# Patient Record
Sex: FEMALE | Race: WHITE | NOT HISPANIC OR LATINO | Employment: OTHER | URBAN - METROPOLITAN AREA
[De-identification: names, ages, dates, MRNs, and addresses within clinical notes are randomized per-mention and may not be internally consistent; named-entity substitution may affect disease eponyms.]

---

## 2017-03-23 ENCOUNTER — TELEPHONE (OUTPATIENT)
Dept: MEDICAL GROUP | Facility: PHYSICIAN GROUP | Age: 82
End: 2017-03-23

## 2017-03-23 ENCOUNTER — HOSPITAL ENCOUNTER (OUTPATIENT)
Dept: LAB | Facility: MEDICAL CENTER | Age: 82
End: 2017-03-23
Attending: NURSE PRACTITIONER
Payer: MEDICARE

## 2017-03-23 DIAGNOSIS — F17.200 CURRENT SMOKER: ICD-10-CM

## 2017-03-23 DIAGNOSIS — Z51.81 ENCOUNTER FOR MONITORING STATIN THERAPY: ICD-10-CM

## 2017-03-23 DIAGNOSIS — E78.2 MIXED HYPERLIPIDEMIA: ICD-10-CM

## 2017-03-23 DIAGNOSIS — Z79.899 ENCOUNTER FOR MONITORING STATIN THERAPY: ICD-10-CM

## 2017-03-23 DIAGNOSIS — E03.8 OTHER SPECIFIED HYPOTHYROIDISM: ICD-10-CM

## 2017-03-23 LAB
ALBUMIN SERPL BCP-MCNC: 4 G/DL (ref 3.2–4.9)
ALBUMIN/GLOB SERPL: 1.2 G/DL
ALP SERPL-CCNC: 85 U/L (ref 30–99)
ALT SERPL-CCNC: 9 U/L (ref 2–50)
ANION GAP SERPL CALC-SCNC: 8 MMOL/L (ref 0–11.9)
AST SERPL-CCNC: 18 U/L (ref 12–45)
BILIRUB SERPL-MCNC: 0.6 MG/DL (ref 0.1–1.5)
BUN SERPL-MCNC: 15 MG/DL (ref 8–22)
CALCIUM SERPL-MCNC: 9.5 MG/DL (ref 8.5–10.5)
CHLORIDE SERPL-SCNC: 106 MMOL/L (ref 96–112)
CHOLEST SERPL-MCNC: 189 MG/DL (ref 100–199)
CO2 SERPL-SCNC: 27 MMOL/L (ref 20–33)
CREAT SERPL-MCNC: 0.86 MG/DL (ref 0.5–1.4)
ERYTHROCYTE [DISTWIDTH] IN BLOOD BY AUTOMATED COUNT: 50.6 FL (ref 35.9–50)
GLOBULIN SER CALC-MCNC: 3.3 G/DL (ref 1.9–3.5)
GLUCOSE SERPL-MCNC: 92 MG/DL (ref 65–99)
HCT VFR BLD AUTO: 47.1 % (ref 37–47)
HDLC SERPL-MCNC: 61 MG/DL
HGB BLD-MCNC: 15.1 G/DL (ref 12–16)
LDLC SERPL CALC-MCNC: 108 MG/DL
MCH RBC QN AUTO: 29.7 PG (ref 27–33)
MCHC RBC AUTO-ENTMCNC: 32.1 G/DL (ref 33.6–35)
MCV RBC AUTO: 92.5 FL (ref 81.4–97.8)
PLATELET # BLD AUTO: 133 K/UL (ref 164–446)
PMV BLD AUTO: 12.5 FL (ref 9–12.9)
POTASSIUM SERPL-SCNC: 3.8 MMOL/L (ref 3.6–5.5)
PROT SERPL-MCNC: 7.3 G/DL (ref 6–8.2)
RBC # BLD AUTO: 5.09 M/UL (ref 4.2–5.4)
SODIUM SERPL-SCNC: 141 MMOL/L (ref 135–145)
T4 FREE SERPL-MCNC: 1.46 NG/DL (ref 0.53–1.43)
TRIGL SERPL-MCNC: 98 MG/DL (ref 0–149)
TSH SERPL DL<=0.005 MIU/L-ACNC: 4.25 UIU/ML (ref 0.3–3.7)
WBC # BLD AUTO: 5.1 K/UL (ref 4.8–10.8)

## 2017-03-23 PROCEDURE — 84443 ASSAY THYROID STIM HORMONE: CPT

## 2017-03-23 PROCEDURE — 85027 COMPLETE CBC AUTOMATED: CPT

## 2017-03-23 PROCEDURE — 80053 COMPREHEN METABOLIC PANEL: CPT

## 2017-03-23 PROCEDURE — 36415 COLL VENOUS BLD VENIPUNCTURE: CPT

## 2017-03-23 PROCEDURE — 80061 LIPID PANEL: CPT

## 2017-03-23 PROCEDURE — 84439 ASSAY OF FREE THYROXINE: CPT

## 2017-03-23 RX ORDER — LEVOTHYROXINE SODIUM 0.12 MG/1
TABLET ORAL
Qty: 30 TAB | Refills: 2 | Status: SHIPPED | OUTPATIENT
Start: 2017-03-23 | End: 2017-03-27

## 2017-03-23 NOTE — TELEPHONE ENCOUNTER
Was the patient seen in the last year in this department? Yes     Does patient have an active prescription for medications requested? No     Received Request Via: Pharmacy      Pt met protocol?: Yes pt last ov 12/2016 last TSH labs 8/2016

## 2017-03-24 NOTE — TELEPHONE ENCOUNTER
----- Message from MADHU Tapia sent at 3/23/2017  4:38 PM PDT -----  There are some abnormalities in the labs.  We will discuss at upcoming appt.  Thank you

## 2017-03-27 ENCOUNTER — OFFICE VISIT (OUTPATIENT)
Dept: MEDICAL GROUP | Facility: PHYSICIAN GROUP | Age: 82
End: 2017-03-27
Payer: MEDICARE

## 2017-03-27 VITALS
BODY MASS INDEX: 17.43 KG/M2 | OXYGEN SATURATION: 91 % | RESPIRATION RATE: 16 BRPM | HEART RATE: 88 BPM | HEIGHT: 63 IN | DIASTOLIC BLOOD PRESSURE: 84 MMHG | SYSTOLIC BLOOD PRESSURE: 128 MMHG | WEIGHT: 98.4 LBS | TEMPERATURE: 97.3 F

## 2017-03-27 DIAGNOSIS — E03.8 OTHER SPECIFIED HYPOTHYROIDISM: Primary | ICD-10-CM

## 2017-03-27 DIAGNOSIS — F17.200 CURRENT SMOKER: ICD-10-CM

## 2017-03-27 DIAGNOSIS — J44.89 COPD (CHRONIC OBSTRUCTIVE PULMONARY DISEASE) WITH CHRONIC BRONCHITIS: ICD-10-CM

## 2017-03-27 PROCEDURE — G8419 CALC BMI OUT NRM PARAM NOF/U: HCPCS | Performed by: NURSE PRACTITIONER

## 2017-03-27 PROCEDURE — 99214 OFFICE O/P EST MOD 30 MIN: CPT | Performed by: NURSE PRACTITIONER

## 2017-03-27 PROCEDURE — 4004F PT TOBACCO SCREEN RCVD TLK: CPT | Performed by: NURSE PRACTITIONER

## 2017-03-27 PROCEDURE — 1101F PT FALLS ASSESS-DOCD LE1/YR: CPT | Performed by: NURSE PRACTITIONER

## 2017-03-27 PROCEDURE — G8432 DEP SCR NOT DOC, RNG: HCPCS | Performed by: NURSE PRACTITIONER

## 2017-03-27 PROCEDURE — 4040F PNEUMOC VAC/ADMIN/RCVD: CPT | Performed by: NURSE PRACTITIONER

## 2017-03-27 PROCEDURE — G8482 FLU IMMUNIZE ORDER/ADMIN: HCPCS | Performed by: NURSE PRACTITIONER

## 2017-03-27 RX ORDER — LEVOTHYROXINE SODIUM 112 UG/1
112 TABLET ORAL
Qty: 90 TAB | Refills: 1 | Status: SHIPPED | OUTPATIENT
Start: 2017-03-27 | End: 2017-08-24 | Stop reason: SDUPTHER

## 2017-03-27 NOTE — MR AVS SNAPSHOT
"        Mary Murrieta   3/27/2017 8:00 AM   Office Visit   MRN: [id number]    Department:  [facility]   Dept Phone:  [phone]    Description:  Female : 1932   Provider:  MADHU Tapia           Reason for Visit     Results labs       Allergies as of 3/27/2017     No Known Allergies      You were diagnosed with     Other specified hypothyroidism   [5318391]  -  Primary     COPD (chronic obstructive pulmonary disease) with chronic bronchitis (CMS-[facility])   [852946]       Current smoker   [[id number]]         Vital Signs     Blood Pressure Pulse Temperature Respirations Height Weight    128/84 mmHg 88 36.3 °C (97.3 °F) 16 1.6 m (5' 3\") 44.634 kg (98 lb 6.4 oz)    Body Mass Index Oxygen Saturation Smoking Status             17.44 kg/m2 91% Current Every Day Smoker         Basic Information     Date Of Birth Sex Race Ethnicity Preferred Language    1932 Female White Non- English      Problem List              ICD-10-CM Priority Class Noted - Resolved    Other specified hypothyroidism E03.8   2016 - Present    Current smoker F17.200   2016 - Present    Mixed hyperlipidemia E78.2   2016 - Present      Health Maintenance        Date Due Completion Dates    COLON CANCER SCREENING ANNUAL FIT 2017 (Originally 1932) ---    IMM DTaP/Tdap/Td Vaccine (1 - Tdap) 2017 (Originally 1951) ---    IMM ZOSTER VACCINE 2017 (Originally 1992) ---            Current Immunizations     13-VALENT PCV PREVNAR 10/1/2015    Influenza Vaccine Adult HD 2016, 10/1/2015    Pneumococcal polysaccharide vaccine (PPSV-23) 2016      Below and/or attached are the medications your provider expects you to take. Review all of your home medications and newly ordered medications with your provider and/or pharmacist. Follow medication instructions as directed by your provider and/or pharmacist. Please keep your medication list with you and share with your provider. " Update the information when medications are discontinued, doses are changed, or new medications (including over-the-counter products) are added; and carry medication information at all times in the event of emergency situations     Allergies:  No Known Allergies          Medications  Valid as of: March 27, 2017 - 10:18 AM    Generic Name Brand Name Tablet Size Instructions for use    Aspirin (Tablet Delayed Response) ECOTRIN 81 MG Take 81 mg by mouth every day.        Atorvastatin Calcium (Tab) LIPITOR 10 MG Take 1 Tab by mouth every day.        Levothyroxine Sodium (Tab) SYNTHROID 112 MCG Take 1 Tab by mouth Every morning on an empty stomach.        Omeprazole (CAPSULE DELAYED RELEASE) PRILOSEC 20 MG Take 20 mg by mouth every day.        Tiotropium Bromide Monohydrate (Cap) SPIRIVA 18 MCG Inhale 1 Cap by mouth every day.        .                 Medicines prescribed today were sent to:     [id number] - [place], [place] - [address]    [address] [address]    Phone: [phone] Fax: [phone]    Open 24 Hours?: No      Medication refill instructions:       If your prescription bottle indicates you have medication refills left, it is not necessary to call your provider’s office. Please contact your pharmacy and they will refill your medication.    If your prescription bottle indicates you do not have any refills left, you may request refills at any time through one of the following ways: The online [employer]On system (except Urgent Care), by calling your provider’s office, or by asking your pharmacy to contact your provider’s office with a refill request. Medication refills are processed only during regular business hours and may not be available until the next business day. Your provider may request additional information or to have a follow-up visit with you prior to refilling your medication.   *Please Note: Medication refills are assigned a new Rx number when refilled  electronically. Your pharmacy may indicate that no refills were authorized even though a new prescription for the same medication is available at the pharmacy. Please request the medicine by name with the pharmacy before contacting your provider for a refill.        Your To Do List     Future Labs/Procedures Complete By Expires    TSH WITH REFLEX TO FT4  As directed 3/27/2018         Customer Alliance Access Code: [email or web address]  Expires: 4/25/2017 11:00 AM    Customer Alliance  A secure, online tool to manage your health information     [employer]’s Customer Alliance® is a secure, online tool that connects you to your personalized health information from the privacy of your home -- day or night - making it very easy for you to manage your healthcare. Once the activation process is completed, you can even access your medical information using the Customer Alliance valdez, which is available for free in the Apple Valdez store or Google Play store.     Customer Alliance provides the following levels of access (as shown below):   My Chart Features   [facility] Primary Care Doctor [facility]  Specialists [facility]  Urgent  Care Non-[facility]  Primary Care  Doctor   Email your healthcare team securely and privately 24/7 X X X    Manage appointments: schedule your next appointment; view details of past/upcoming appointments X      Request prescription refills. X      View recent personal medical records, including lab and immunizations X X X X   View health record, including health history, allergies, medications X X X X   Read reports about your outpatient visits, procedures, consult and ER notes X X X X   See your discharge summary, which is a recap of your hospital and/or ER visit that includes your diagnosis, lab results, and care plan. X X       How to register for Customer Alliance:  1. Go to  [email or web address].  2. Click on the Sign Up Now box, which takes you to the New Member Sign Up page. You will need to provide the following information:  a. Enter your Customer Alliance Access Code  exactly as it appears at the top of this page. (You will not need to use this code after you’ve completed the sign-up process. If you do not sign up before the expiration date, you must request a new code.)   b. Enter your date of birth.   c. Enter your home email address.   d. Click Submit, and follow the next screen’s instructions.  3. Create a [facility] [facility] [employer] ID. This will be your [facility] login ID and cannot be changed, so think of one that is secure and easy to remember.  4. Create a [employer] password. You can change your password at any time.  5. Enter your Password Reset Question and Answer. This can be used at a later time if you forget your password.   6. Enter your e-mail address. This allows you to receive e-mail notifications when new information is available in [employer] [facility] [employer].  7. Click Sign Up. You can now view your health information.    For assistance activating your [employer] [facility] [employer] account, call [phone]        Quit Tobacco Information     Do you want to quit using tobacco?    Quitting tobacco decreases risks of cancer, heart and lung disease, increases life expectancy, improves sense of taste and smell, and increases spending money, among other benefits.    If you are thinking about quitting, we can help.  • [facility] Quit Tobacco Program: 775.717.3978  o Program occurs weekly for four weeks and includes pharmacist consultation on products to support quitting smoking or chewing tobacco. A provider referral is needed for pharmacist consultation.  • Tobacco Users Help Hotline: 2-800-QUIT-NOW (185-1700) or https://[place].quitlogix.org/  o Free, confidential telephone and online coaching for [place] residents. Sessions are designed on a schedule that is convenient for you. Eligible clients receive free nicotine replacement therapy.  • Nationally: www.smokefree.gov  o Information and professional assistance to support both immediate and long-term needs as you become, and remain, a non-smoker. Smokefree.gov allows you to  choose the help that best fits your needs.

## 2017-03-27 NOTE — PROGRESS NOTES
"Chief Complaint   Patient presents with   • Results     labs        HISTORY OF PRESENT ILLNESS: Patient is a 84 y.o. female established patient who presents today to discuss recent results:    1. Other specified hypothyroidism  Patient has been on thyroid replacement for a number of years.  She is currently using levothyroxine 125 µg.  Recent labs are somewhat unusual as her TSH is elevated at 4.25, however free T is also elevated at 1.46.  Patient does mention that she has been having a hard time maintaining her weight.  She denies any bothersome diarrhea, increased anxiety or heart palpitations.  No disruption in her sleep.    2. COPD (chronic obstructive pulmonary disease) with chronic bronchitis (CMS-[facility])  Patient is inquiring about a cheaper alternative to her current medication, Spiriva.  She mentions that she does not take it daily, only \"as needed.\"  She reports her last dose was yesterday.  She does cough, more so in the morning.  She continues to smoke.  She denies any urgent care or ER visits over the winter.    3. Current smoker    Allergies:Review of patient's allergies indicates no known allergies.    Current Outpatient Prescriptions Ordered in [facility]   Medication Sig Dispense Refill   • levothyroxine (SYNTHROID) 112 MCG Tab Take 1 Tab by mouth Every morning on an empty stomach. 90 Tab 1   • aspirin EC (ECOTRIN) 81 MG Tablet Delayed Response Take 81 mg by mouth every day.     • atorvastatin (LIPITOR) 10 MG Tab Take 1 Tab by mouth every day. 90 Tab 3   • tiotropium (SPIRIVA) 18 MCG Cap Inhale 1 Cap by mouth every day. 90 Cap 3   • omeprazole (PRILOSEC) 20 MG delayed-release capsule Take 20 mg by mouth every day.       No current [facility]-ordered facility-administered medications on file.       Past Medical History   Diagnosis Date   • Thyroid disease        Social History   Substance Use Topics   • Smoking status: Current Every Day Smoker -- 0.50 packs/day for 50 years     Types: Cigarettes   • Smokeless " "tobacco: Never Used   • Alcohol Use: 0.0 oz/week     0 Standard drinks or equivalent per week      Comment: has a glass of wine 3x a week       Family Status   Relation Status Death Age   • Mother     • Father     • Sister     • Sister     • Sister       Family History   Problem Relation Age of Onset   • Cancer Mother    • Cancer Father      esophageal   • Stroke Father    • Lung Disease Sister      emphysema       ROS: see above    Review of Systems   Constitutional: Negative for fever, chills, weight loss and malaise/fatigue.   HENT: Negative for ear pain, nosebleeds, congestion, sore throat and neck pain.    Eyes: Negative for blurred vision.   Respiratory: Negative for cough, sputum production, shortness of breath and wheezing.    Cardiovascular: Negative for chest pain, palpitations, orthopnea and leg swelling.   Gastrointestinal: Negative for heartburn, nausea, vomiting and abdominal pain.   Genitourinary: Negative for dysuria, urgency and frequency.   Musculoskeletal: Negative for myalgias, back pain and joint pain.   Skin: Negative for rash and itching.   Neurological: Negative for dizziness, tingling, tremors, sensory change, focal weakness and headaches.   Endo/Heme/Allergies: Does not bruise/bleed easily.   Psychiatric/Behavioral: Negative for depression, suicidal ideas and memory loss.  The patient is not nervous/anxious and does not have insomnia.        Exam:  Blood pressure 128/84, pulse 88, temperature 36.3 °C (97.3 °F), resp. rate 16, height 1.6 m (5' 3\"), weight 44.634 kg (98 lb 6.4 oz), SpO2 91 %.  General:  Well nourished, well developed female in NAD.  Head is grossly normal.  Neck: Thyroid is not enlarged.  Pulmonary: Normal effort. Diminished throughout.  Cardiovascular: Regular rate and rhythm without murmur.   Extremities: no clubbing, cyanosis, or edema.  Psych:  Mood and affect are normal.  Answering questions appropriately with good eye " contact.      Please note that this dictation was created using voice recognition software. I have made every reasonable attempt to correct obvious errors, but I expect that there are errors of grammar and possibly content that I did not discover before finalizing the note.    Assessment/Plan:    1. Other specified hypothyroidism  levothyroxine (SYNTHROID) 112 MCG Tab    TSH WITH REFLEX TO FT4   2. COPD (chronic obstructive pulmonary disease) with chronic bronchitis (CMS-[facility])     3. Current smoker          1.  Reduce levothyroxine to 112 µg.  Plan on repeating thyroid labs in 6-8 weeks.  2.  Discussed the purpose of Spiriva, as a maintenance medication, and not to be used as needed.  Patient verbalizes understanding.  3.  Handicap placard issue, as requested.  4.  We will contact the patient with the above lab results, follow-up pending.

## 2017-05-11 RX ORDER — ATORVASTATIN CALCIUM 10 MG/1
TABLET, FILM COATED ORAL
Qty: 90 TAB | Refills: 1 | Status: SHIPPED | OUTPATIENT
Start: 2017-05-11 | End: 2018-02-01 | Stop reason: SDUPTHER

## 2017-05-11 NOTE — TELEPHONE ENCOUNTER
Was the patient seen in the last year in this department? Yes     Does patient have an active prescription for medications requested? No     Received Request Via: Pharmacy      Pt met protocol?: Yes, last ov 3/27/17, last lipid panel 3/23/17  BP Readings from Last 1 Encounters:   03/27/17 128/84

## 2017-05-11 NOTE — TELEPHONE ENCOUNTER
Last seen by PCP 3/17. Will send 6 months to pharmacy.   Lab Results   Component Value Date/Time    CHOLESTEROL, 03/23/2017 07:35 AM    * 03/23/2017 07:35 AM    HDL 61 03/23/2017 07:35 AM    TRIGLYCERIDES 98 03/23/2017 07:35 AM       Lab Results   Component Value Date/Time    SODIUM 141 03/23/2017 07:35 AM    POTASSIUM 3.8 03/23/2017 07:35 AM    CHLORIDE 106 03/23/2017 07:35 AM    CO2 27 03/23/2017 07:35 AM    GLUCOSE 92 03/23/2017 07:35 AM    BUN 15 03/23/2017 07:35 AM    CREATININE 0.86 03/23/2017 07:35 AM     Lab Results   Component Value Date/Time    ALKALINE PHOSPHATASE 85 03/23/2017 07:35 AM    AST(SGOT) 18 03/23/2017 07:35 AM    ALT(SGPT) 9 03/23/2017 07:35 AM    TOTAL BILIRUBIN 0.6 03/23/2017 07:35 AM

## 2017-06-05 RX ORDER — TIOTROPIUM BROMIDE 18 UG/1
CAPSULE ORAL; RESPIRATORY (INHALATION)
Qty: 90 CAP | Refills: 1 | Status: SHIPPED | OUTPATIENT
Start: 2017-06-05 | End: 2017-11-21

## 2017-06-05 NOTE — TELEPHONE ENCOUNTER
Was the patient seen in the last year in this department? Yes     Does patient have an active prescription for medications requested? No     Received Request Via: Pharmacy      Pt met protocol?: Yes    LAST OV 03/27/2017

## 2017-08-24 RX ORDER — LEVOTHYROXINE SODIUM 112 UG/1
112 TABLET ORAL
Qty: 90 TAB | Refills: 0 | Status: SHIPPED | OUTPATIENT
Start: 2017-08-24 | End: 2017-09-29

## 2017-08-24 RX ORDER — LEVOTHYROXINE SODIUM 0.12 MG/1
TABLET ORAL
Refills: 2 | OUTPATIENT
Start: 2017-08-24

## 2017-08-24 NOTE — TELEPHONE ENCOUNTER
Was the patient seen in the last year in this department? Yes     Does patient have an active prescription for medications requested? No     Received Request Via: Pharmacy      Pt met protocol?: Yes, LABS AND OV 3/17 TSH 4.250

## 2017-08-24 NOTE — TELEPHONE ENCOUNTER
Has upcoming appt w/ PCP. Will send 3 months of fills to pharmacy. Sent correct strength to pharmacy.

## 2017-09-20 ENCOUNTER — OFFICE VISIT (OUTPATIENT)
Dept: MEDICAL GROUP | Facility: PHYSICIAN GROUP | Age: 82
End: 2017-09-20
Payer: MEDICARE

## 2017-09-20 ENCOUNTER — HOSPITAL ENCOUNTER (OUTPATIENT)
Dept: LAB | Facility: MEDICAL CENTER | Age: 82
End: 2017-09-20
Attending: NURSE PRACTITIONER
Payer: MEDICARE

## 2017-09-20 VITALS
HEART RATE: 92 BPM | OXYGEN SATURATION: 95 % | BODY MASS INDEX: 16.66 KG/M2 | WEIGHT: 94 LBS | DIASTOLIC BLOOD PRESSURE: 86 MMHG | TEMPERATURE: 97.7 F | SYSTOLIC BLOOD PRESSURE: 112 MMHG | HEIGHT: 63 IN | RESPIRATION RATE: 14 BRPM

## 2017-09-20 DIAGNOSIS — E78.2 MIXED HYPERLIPIDEMIA: ICD-10-CM

## 2017-09-20 DIAGNOSIS — E89.0 POSTOPERATIVE HYPOTHYROIDISM: ICD-10-CM

## 2017-09-20 DIAGNOSIS — F17.200 CURRENT SMOKER: ICD-10-CM

## 2017-09-20 LAB
ALBUMIN SERPL BCP-MCNC: 4.5 G/DL (ref 3.2–4.9)
ALBUMIN/GLOB SERPL: 1.5 G/DL
ALP SERPL-CCNC: 96 U/L (ref 30–99)
ALT SERPL-CCNC: 9 U/L (ref 2–50)
ANION GAP SERPL CALC-SCNC: 12 MMOL/L (ref 0–11.9)
AST SERPL-CCNC: 18 U/L (ref 12–45)
BILIRUB SERPL-MCNC: 0.7 MG/DL (ref 0.1–1.5)
BUN SERPL-MCNC: 15 MG/DL (ref 8–22)
CALCIUM SERPL-MCNC: 9.7 MG/DL (ref 8.5–10.5)
CHLORIDE SERPL-SCNC: 102 MMOL/L (ref 96–112)
CO2 SERPL-SCNC: 26 MMOL/L (ref 20–33)
CREAT SERPL-MCNC: 0.85 MG/DL (ref 0.5–1.4)
GFR SERPL CREATININE-BSD FRML MDRD: >60 ML/MIN/1.73 M 2
GLOBULIN SER CALC-MCNC: 3.1 G/DL (ref 1.9–3.5)
GLUCOSE SERPL-MCNC: 89 MG/DL (ref 65–99)
POTASSIUM SERPL-SCNC: 4.1 MMOL/L (ref 3.6–5.5)
PROT SERPL-MCNC: 7.6 G/DL (ref 6–8.2)
SODIUM SERPL-SCNC: 140 MMOL/L (ref 135–145)
TSH SERPL DL<=0.005 MIU/L-ACNC: 1.55 UIU/ML (ref 0.3–3.7)

## 2017-09-20 PROCEDURE — 80053 COMPREHEN METABOLIC PANEL: CPT

## 2017-09-20 PROCEDURE — 36415 COLL VENOUS BLD VENIPUNCTURE: CPT

## 2017-09-20 PROCEDURE — 99214 OFFICE O/P EST MOD 30 MIN: CPT | Performed by: NURSE PRACTITIONER

## 2017-09-20 PROCEDURE — 84443 ASSAY THYROID STIM HORMONE: CPT

## 2017-09-20 RX ORDER — BUDESONIDE AND FORMOTEROL FUMARATE DIHYDRATE 160; 4.5 UG/1; UG/1
2 AEROSOL RESPIRATORY (INHALATION) 2 TIMES DAILY
Qty: 1 INHALER | Refills: 1 | Status: SHIPPED | OUTPATIENT
Start: 2017-09-20 | End: 2017-11-09 | Stop reason: SDUPTHER

## 2017-09-20 NOTE — ASSESSMENT & PLAN NOTE
Patient is a current smoker and pat smokes one pack per day of cigarettes and has no intention of stopping. Currently on Spiriva 18 µg capsules daily but does not feel that it is working and will like a prescription for Symbicort due to cost.

## 2017-09-20 NOTE — ASSESSMENT & PLAN NOTE
Chronic condition: Patient is treated for hypothyroidism with levothyroxine 112 µg. She states that she had a thyroidectomy approximately 40 years ago. She  denies heart palpitations, fatigue, weight gain, depression, dry skin, hair loss, constipation, weakness, headache, lethargy or panic .  She has temperature intolerance.

## 2017-09-20 NOTE — ASSESSMENT & PLAN NOTE
Chronic Condition: Patient has hyperlipidemia and is currently taking atorvastatin 10 mg daily for approximately 5 years. She denies any chest pain, diaphoresis, shortness of breath, headaches, dizziness, blurred vision or myalgias. Patient is a  Smoker, one ppd .

## 2017-09-20 NOTE — PROGRESS NOTES
Chief Complaint   Patient presents with   • Establish Care     N2U    • Oral Swelling   • Medication Management     Spiriva        HPI:    Mary Murrieta is a 84 y.o. female here to establish care and to discuss the following:    Hypothyroidism  Chronic condition: Patient is treated for hypothyroidism with levothyroxine 112 µg. She states that she had a thyroidectomy approximately 40 years ago. She  denies heart palpitations, fatigue, weight gain, depression, dry skin, hair loss, constipation, weakness, headache, lethargy or panic .  She has temperature intolerance.    Current smoker  Patient is a current smoker and pat smokes one pack per day of cigarettes and has no intention of stopping. Currently on Spiriva 18 µg capsules daily but does not feel that it is working and will like a prescription for Symbicort due to cost.    Mixed hyperlipidemia  Chronic Condition: Patient has hyperlipidemia and is currently taking atorvastatin 10 mg daily for approximately 5 years. She denies any chest pain, diaphoresis, shortness of breath, headaches, dizziness, blurred vision or myalgias. Patient is a  Smoker, one ppd .       Current medicines (including changes today)  Current Outpatient Prescriptions   Medication Sig Dispense Refill   • budesonide-formoterol (SYMBICORT) 160-4.5 MCG/ACT Aerosol Inhale 2 Puffs by mouth 2 Times a Day. 1 Inhaler 1   • levothyroxine (SYNTHROID) 112 MCG Tab Take 1 Tab by mouth Every morning on an empty stomach. 90 Tab 0   • SPIRIVA HANDIHALER 18 MCG Cap INHALE 1 CAP BY MOUTH EVERY DAY. 90 Cap 1   • atorvastatin (LIPITOR) 10 MG Tab TAKE 1 TAB BY MOUTH EVERY DAY. 90 Tab 1   • aspirin EC (ECOTRIN) 81 MG Tablet Delayed Response Take 81 mg by mouth every day.     • omeprazole (PRILOSEC) 20 MG delayed-release capsule Take 20 mg by mouth every day.       No current facility-administered medications for this visit.      She  has a past medical history of Thyroid disease.  She  has a past surgical history that  "includes tonsillectomy (1938); thyroidectomy; and abdominal hysterectomy total ().  Social History   Substance Use Topics   • Smoking status: Current Every Day Smoker     Packs/day: 0.50     Years: 50.00     Types: Cigarettes   • Smokeless tobacco: Never Used   • Alcohol use 0.0 oz/week      Comment: has a glass of wine 3x a week     Social History     Social History Narrative   • No narrative on file     Family History   Problem Relation Age of Onset   • Cancer Mother    • Cancer Father      esophageal   • Stroke Father    • Lung Disease Sister      emphysema     Family Status   Relation Status   • Mother    • Father    • Sister    • Sister    • Sister          ROS    Review of Systems   Constitutional: Negative for fever, chills, weight loss and malaise/fatigue.   HENT: Negative for ear pain, nosebleeds, congestion, sore throat and neck pain.    Eyes: Negative for blurred vision.   Respiratory: Negative for cough, sputum production, shortness of breath and wheezing. Positive for dyspnea on exertion  Cardiovascular: Negative for chest pain, palpitations,  and leg swelling.   Gastrointestinal: Negative for heartburn, nausea, vomiting, diarrhea and abdominal pain.   Genitourinary: Negative for dysuria, urgency and frequency.   Musculoskeletal: Negative for myalgias, back pain and joint pain.   Skin: Negative for rash and itching.   Neurological: Negative for dizziness, tingling, tremors, sensory change, focal weakness and headaches.   Endo/Heme/Allergies: Does not bruise/bleed easily.   Psychiatric/Behavioral: Negative for depression, anxiety, suicidal ideas, insomnia and memory loss.      All other systems reviewed and are negative except as in HPI.     Objective:     Blood pressure 112/86, pulse 92, temperature 36.5 °C (97.7 °F), resp. rate 14, height 1.6 m (5' 3\"), weight 42.6 kg (94 lb), SpO2 95 %. Body mass index is 16.65 kg/m².  Physical Exam:  Constitutional: Alert, " no distress. Frail  Skin: Warm, dry, good turgor, no rashes in visible areas.  Eye: Equal, round and reactive, conjunctiva clear, lids normal.  ENMT: Lips without lesions, good dentition, oropharynx clear. Ear canals are clear, TMs within normal limits bilaterally.   Neck: Trachea midline, no masses, no thyromegaly. No cervical or supraclavicular lymphadenopathy.  Respiratory: Unlabored respiratory effort, lungs clear to auscultation, no wheezes, no ronchi.  Cardiovascular: Normal S1, S2, no murmur, no edema.  Abdomen: Soft, non-tender, no masses, no hepatosplenomegaly.  Psych: Alert and oriented x3, normal affect and mood.  MS: Ambulates independently with steady gait. Has full range of motion of all extremities and spine.  Neuro: Cranial nerves intact. DTRs within normal limits. No neurological deficits.        Assessment and Plan:   The following treatment plan was discussed   1. Postoperative hypothyroidism  COMP METABOLIC PANEL    TSH WITH REFLEX TO FT4   2. Current smoker  budesonide-formoterol (SYMBICORT) 160-4.5 MCG/ACT Aerosol   3. Mixed hyperlipidemia  Continue current medication.       Followup: Return in about 3 months (around 12/20/2017) for thyroid, follow-up on Symbicort.   Please note that this dictation was created using voice recognition software. I have made every reasonable attempt to correct obvious errors, but I expect that there are errors of grammar and possibly content that I did not discover before finalizing the note.

## 2017-09-21 ENCOUNTER — TELEPHONE (OUTPATIENT)
Dept: MEDICAL GROUP | Facility: PHYSICIAN GROUP | Age: 82
End: 2017-09-21

## 2017-09-21 NOTE — TELEPHONE ENCOUNTER
----- Message from PRADIP Kebede sent at 9/21/2017  2:39 PM PDT -----  Please call patient. I have reviewed their most recent lab results. They are within normal limits.  Followup as discussed.    Please message me with any questions or concerns.

## 2017-09-29 RX ORDER — LEVOTHYROXINE SODIUM 112 UG/1
112 TABLET ORAL
Qty: 90 TAB | Refills: 1 | Status: SHIPPED | OUTPATIENT
Start: 2017-09-29 | End: 2017-11-27

## 2017-11-08 ENCOUNTER — PATIENT OUTREACH (OUTPATIENT)
Dept: HEALTH INFORMATION MANAGEMENT | Facility: OTHER | Age: 82
End: 2017-11-08

## 2017-11-08 NOTE — PROGRESS NOTES
Outcome: Left Message    Please transfer to Patient Outreach Team at [phone] when patient returns call.    WebIZ Checked & Epic Updated:  yes    [facility] Verified: yes    Attempt # 1

## 2017-11-09 DIAGNOSIS — F17.200 CURRENT SMOKER: ICD-10-CM

## 2017-11-10 RX ORDER — BUDESONIDE AND FORMOTEROL FUMARATE DIHYDRATE 160; 4.5 UG/1; UG/1
2 AEROSOL RESPIRATORY (INHALATION) 2 TIMES DAILY
Qty: 1 INHALER | Refills: 0 | Status: SHIPPED | OUTPATIENT
Start: 2017-11-10 | End: 2017-12-18 | Stop reason: SDUPTHER

## 2017-11-10 NOTE — TELEPHONE ENCOUNTER
Please remind pt to make appt to discus how this med is working by 12/17. Will send one more month to get her to that point.

## 2017-11-21 ENCOUNTER — OFFICE VISIT (OUTPATIENT)
Dept: MEDICAL GROUP | Facility: PHYSICIAN GROUP | Age: 82
End: 2017-11-21
Payer: MEDICARE

## 2017-11-21 VITALS
SYSTOLIC BLOOD PRESSURE: 118 MMHG | BODY MASS INDEX: 16.55 KG/M2 | HEART RATE: 67 BPM | WEIGHT: 93.4 LBS | OXYGEN SATURATION: 96 % | RESPIRATION RATE: 16 BRPM | HEIGHT: 63 IN | DIASTOLIC BLOOD PRESSURE: 88 MMHG | TEMPERATURE: 98.1 F

## 2017-11-21 DIAGNOSIS — Z02.4 ENCOUNTER FOR DRIVER'S LICENSE HISTORY AND PHYSICAL: ICD-10-CM

## 2017-11-21 DIAGNOSIS — Z23 NEED FOR VACCINATION: ICD-10-CM

## 2017-11-21 DIAGNOSIS — F17.200 CURRENT SMOKER: ICD-10-CM

## 2017-11-21 DIAGNOSIS — E89.0 POSTOPERATIVE HYPOTHYROIDISM: ICD-10-CM

## 2017-11-21 PROCEDURE — 99213 OFFICE O/P EST LOW 20 MIN: CPT | Mod: 25 | Performed by: NURSE PRACTITIONER

## 2017-11-21 PROCEDURE — G0008 ADMIN INFLUENZA VIRUS VAC: HCPCS | Performed by: NURSE PRACTITIONER

## 2017-11-21 PROCEDURE — 90662 IIV NO PRSV INCREASED AG IM: CPT | Performed by: NURSE PRACTITIONER

## 2017-11-21 ASSESSMENT — PATIENT HEALTH QUESTIONNAIRE - PHQ9: CLINICAL INTERPRETATION OF PHQ2 SCORE: 0

## 2017-11-21 NOTE — PROGRESS NOTES
Subjective:     Chief Complaint   Patient presents with   • Medication Management   • Other     DMV Placard        HPI:  Mary Murrieta is a 84 y.o. female here today to discuss the following:    Current smoker  Patient is a current smoker and smokes approximately one pack per day. She states that she has been cutting back. She uses Symbicort inhaler.    Hypothyroidism  Chronic condition: Patient is treated for hypothyroidism with levothyroxine 112 µg daily She  denies heart palpitations, fatigue, weight gain, cold intolerance, depression, dry skin, hair loss, constipation, weakness, headache, lethargy or panic . Last TSH September 2017 was 1.55.     Encounter for 's license history and physical  Patient present with a DMV form stating she has no medical conditions that would prevent her from driving. Does have decreased vision and is following up with optometry and has ordered new corrective lenses.   Patient denies any chest pain, diaphoresis, palpitations, shortness of breath, dyspnea, headaches or dizziness.     Current medicines (including changes today)  Current Outpatient Prescriptions   Medication Sig Dispense Refill   • budesonide-formoterol (SYMBICORT) 160-4.5 MCG/ACT Aerosol Inhale 2 Puffs by mouth 2 Times a Day. 1 Inhaler 0   • levothyroxine (SYNTHROID) 112 MCG Tab TAKE 1 TAB BY MOUTH EVERY MORNING ON AN EMPTY STOMACH. 90 Tab 1   • atorvastatin (LIPITOR) 10 MG Tab TAKE 1 TAB BY MOUTH EVERY DAY. 90 Tab 1   • aspirin EC (ECOTRIN) 81 MG Tablet Delayed Response Take 81 mg by mouth every day.     • omeprazole (PRILOSEC) 20 MG delayed-release capsule Take 20 mg by mouth every day.       No current facility-administered medications for this visit.        She  has a past medical history of Thyroid disease.    ROS   Review of Systems   Constitutional: Negative for fever, chills, weight loss and malaise/fatigue.   HENT: Negative for ear pain, nosebleeds, congestion, sore throat and neck pain.    Respiratory:  "Negative for cough, sputum production, shortness of breath and wheezing.    Cardiovascular: Negative for chest pain, palpitations,  and leg swelling.   Gastrointestinal: Negative for heartburn, nausea, vomiting, diarrhea and abdominal pain.   Neurological: Negative for dizziness, tingling, tremors, sensory change, focal weakness and headaches.   Psychiatric/Behavioral: Negative for depression, anxiety, suicidal ideas, insomnia and memory loss.    All other systems reviewed and are negative except as in HPI.     Objective:   Physical Exam:  Blood pressure 118/88, pulse 67, temperature 36.7 °C (98.1 °F), resp. rate 16, height 1.6 m (5' 3\"), weight 42.4 kg (93 lb 6.4 oz), SpO2 96 %. Body mass index is 16.55 kg/m².   Physical Exam:  Alert, oriented in no acute distress.  Eye contact is good, speech goal directed, affect calm  HEENT: conjunctiva non-injected, sclera non-icteric.  Pinna normal.   Neck No adenopathy or masses in the neck or supraclavicular regions.  Lungs: clear to auscultation bilaterally with good excursion.  CV: regular rate and rhythm.   Abdomen: soft, nontender, No CVAT. Normal bowel sounds.  Ext: no edema, color normal, vascularity normal, temperature normal  MS: Normal gait and station    Assessment and Plan:   The following treatment plan was discussed   1. Current smoker     2. Need for vaccination  INFLUENZA VACCINE, HIGH DOSE (65+ ONLY)   3. Postoperative hypothyroidism     4. Encounter for 's license history and physical       I have placed the above orders and discussed them with an approved delegating provider. The MA is performing the above orders under the direction of Dr. Quintero, who has provided verbal consent for supervision.  Followup: Return in about 6 months (around 5/21/2018), or if symptoms worsen or fail to improve, for thyroid.   Please note that this dictation was created using voice recognition software. I have made every reasonable attempt to correct obvious errors, but I " expect that there are errors of grammar and possibly content that I did not discover before finalizing the note.

## 2017-11-21 NOTE — ASSESSMENT & PLAN NOTE
Patient is a current smoker and smokes approximately one pack per day. She states that she has been cutting back. She uses Symbicort inhaler.

## 2017-11-21 NOTE — ASSESSMENT & PLAN NOTE
Patient present with a DMV form stating she has no medical conditions that would prevent her from driving. Does have decreased vision and is following up with optometry and has ordered new corrective lenses.   Patient denies any chest pain, diaphoresis, palpitations, shortness of breath, dyspnea, headaches or dizziness.

## 2017-11-21 NOTE — ASSESSMENT & PLAN NOTE
Chronic condition: Patient is treated for hypothyroidism with levothyroxine 112 µg daily She  denies heart palpitations, fatigue, weight gain, cold intolerance, depression, dry skin, hair loss, constipation, weakness, headache, lethargy or panic . Last TSH September 2017 was 1.55.

## 2017-11-27 RX ORDER — LEVOTHYROXINE SODIUM 112 UG/1
112 TABLET ORAL
Qty: 90 TAB | Refills: 2 | Status: SHIPPED | OUTPATIENT
Start: 2017-11-27 | End: 2019-05-28 | Stop reason: SDUPTHER

## 2017-12-18 DIAGNOSIS — F17.200 CURRENT SMOKER: ICD-10-CM

## 2017-12-19 RX ORDER — BUDESONIDE AND FORMOTEROL FUMARATE DIHYDRATE 160; 4.5 UG/1; UG/1
2 AEROSOL RESPIRATORY (INHALATION) 2 TIMES DAILY
Qty: 3 INHALER | Refills: 1 | Status: SHIPPED | OUTPATIENT
Start: 2017-12-19 | End: 2018-03-20

## 2017-12-19 NOTE — TELEPHONE ENCOUNTER
Was the patient seen in the last year in this department? Yes     Does patient have an active prescription for medications requested? No     Received Request Via: Pharmacy      Pt met protocol?: Yes, OV last month, per dr's request at last refill (11/17)

## 2018-01-23 ENCOUNTER — HOSPITAL ENCOUNTER (OUTPATIENT)
Dept: RADIOLOGY | Facility: MEDICAL CENTER | Age: 83
End: 2018-01-23
Attending: EMERGENCY MEDICINE
Payer: MEDICARE

## 2018-01-23 ENCOUNTER — OFFICE VISIT (OUTPATIENT)
Dept: URGENT CARE | Facility: PHYSICIAN GROUP | Age: 83
End: 2018-01-23
Payer: MEDICARE

## 2018-01-23 VITALS
HEART RATE: 78 BPM | BODY MASS INDEX: 16.76 KG/M2 | DIASTOLIC BLOOD PRESSURE: 82 MMHG | WEIGHT: 94.6 LBS | HEIGHT: 63 IN | OXYGEN SATURATION: 92 % | RESPIRATION RATE: 16 BRPM | TEMPERATURE: 97.7 F | SYSTOLIC BLOOD PRESSURE: 120 MMHG

## 2018-01-23 DIAGNOSIS — M54.42 ACUTE LEFT-SIDED LOW BACK PAIN WITH LEFT-SIDED SCIATICA: ICD-10-CM

## 2018-01-23 PROCEDURE — 99204 OFFICE O/P NEW MOD 45 MIN: CPT | Mod: 25 | Performed by: EMERGENCY MEDICINE

## 2018-01-23 PROCEDURE — 72100 X-RAY EXAM L-S SPINE 2/3 VWS: CPT

## 2018-01-23 RX ORDER — KETOROLAC TROMETHAMINE 30 MG/ML
15 INJECTION, SOLUTION INTRAMUSCULAR; INTRAVENOUS ONCE
Status: COMPLETED | OUTPATIENT
Start: 2018-01-23 | End: 2018-01-23

## 2018-01-23 RX ORDER — METHOCARBAMOL 750 MG/1
750 TABLET, FILM COATED ORAL 3 TIMES DAILY
Qty: 60 TAB | Refills: 0 | Status: SHIPPED | OUTPATIENT
Start: 2018-01-23 | End: 2018-03-20

## 2018-01-23 RX ADMIN — KETOROLAC TROMETHAMINE 15 MG: 30 INJECTION, SOLUTION INTRAMUSCULAR; INTRAVENOUS at 17:42

## 2018-01-24 ASSESSMENT — ENCOUNTER SYMPTOMS
EYE REDNESS: 0
ABDOMINAL PAIN: 0
VOMITING: 0
PALPITATIONS: 0
CHILLS: 0
BACK PAIN: 1
NAUSEA: 0
NERVOUS/ANXIOUS: 1
FEVER: 0
COUGH: 1
TINGLING: 1
EYE DISCHARGE: 0

## 2018-01-24 NOTE — PROGRESS NOTES
Subjective:      Mary Murrieta is a 85 y.o. female who presents with Back Pain (low back pain since yesterday, pt was pulling on furnature also having radiation down Left leg)            HPI pt moving old heavy [employer] sewing machine 24 hours ago. Pain radiates into left thigh. No incontinence. No fever chills nausea of vomiting  No Known Allergies   Social History     Social History   • Marital status:      Spouse name: N/A   • Number of children: N/A   • Years of education: N/A     Occupational History   • Not on file.     Social History Main Topics   • Smoking status: Current Every Day Smoker     Packs/day: 0.50     Years: 50.00     Types: Cigarettes   • Smokeless tobacco: Never Used   • Alcohol use 0.0 oz/week      Comment: has a glass of wine 3x a week   • Drug use: No   • Sexual activity: No     Other Topics Concern   • Not on file     Social History Narrative   • No narrative on file     Past Medical History:   Diagnosis Date   • Thyroid disease      Current Outpatient Prescriptions on File Prior to Visit   Medication Sig Dispense Refill   • budesonide-formoterol (SYMBICORT) 160-4.5 MCG/ACT Aerosol Inhale 2 Puffs by mouth 2 Times a Day. 3 Inhaler 1   • levothyroxine (SYNTHROID) 112 MCG Tab TAKE 1 TAB BY MOUTH EVERY MORNING ON AN EMPTY STOMACH. 90 Tab 2   • aspirin EC (ECOTRIN) 81 MG Tablet Delayed Response Take 81 mg by mouth every day.     • omeprazole (PRILOSEC) 20 MG delayed-release capsule Take 20 mg by mouth every day.     • atorvastatin (LIPITOR) 10 MG Tab TAKE 1 TAB BY MOUTH EVERY DAY. 90 Tab 1     No current facility-administered medications on file prior to visit.      Family History   Problem Relation Age of Onset   • Cancer Mother    • Cancer Father      esophageal   • Stroke Father    • Lung Disease Sister      emphysema     Review of Systems   Constitutional: Negative for chills and fever.   Eyes: Negative for discharge and redness.   Respiratory: Positive for cough.    Cardiovascular:  "Negative for chest pain and palpitations.   Gastrointestinal: Negative for abdominal pain, nausea and vomiting.        No bowel of bladder incintinence   Genitourinary: Negative for dysuria and urgency.   Musculoskeletal: Positive for back pain.        Left lower back pin   Skin: Negative for rash.   Neurological: Positive for tingling.   Psychiatric/Behavioral: The patient is nervous/anxious.           Objective:     /82   Pulse 78   Temp 36.5 °C (97.7 °F)   Resp 16   Ht 1.6 m (5' 3\")   Wt 42.9 kg (94 lb 9.6 oz)   SpO2 92%   BMI 16.76 kg/m²      Physical Exam   Constitutional: She appears well-developed and well-nourished.   HENT:   Head: Normocephalic and atraumatic.   Right Ear: External ear normal.   Left Ear: External ear normal.   Eyes: Conjunctivae are normal.   Cardiovascular: Normal rate and regular rhythm.    Pulmonary/Chest: Effort normal and breath sounds normal. No respiratory distress. She has no wheezes.   Abdominal: Soft. She exhibits no distension. There is no tenderness.   Musculoskeletal: Normal range of motion. She exhibits tenderness. She exhibits no edema.   Tender left lumbar paraspinus , no rash or anesthesia, rectal tone adequate. SLR 90/90 reflex /dorsiflexion normal.   Neurological: She is alert. She displays normal reflexes. No sensory deficit. Coordination normal.   Skin: Skin is warm. No rash noted. She is not diaphoretic.   Psychiatric: She has a normal mood and affect. Her behavior is normal.   Nursing note and vitals reviewed.              Assessment/Plan:     DX: Sciatica on the left             COPD             Aortic aneurysm stable    PT was given toradol in UC with some relief. I rec she use Advil 400  OTC with meals and Robaxin 750 TID..She will contact me in two days with up date and f/u with PCP. WIll return for severe pain, bowel/ bladder incontinence.  "

## 2018-01-27 ENCOUNTER — HOSPITAL ENCOUNTER (EMERGENCY)
Facility: MEDICAL CENTER | Age: 83
End: 2018-01-27
Attending: EMERGENCY MEDICINE
Payer: MEDICARE

## 2018-01-27 ENCOUNTER — APPOINTMENT (OUTPATIENT)
Dept: RADIOLOGY | Facility: MEDICAL CENTER | Age: 83
End: 2018-01-27
Attending: EMERGENCY MEDICINE
Payer: MEDICARE

## 2018-01-27 VITALS
DIASTOLIC BLOOD PRESSURE: 78 MMHG | SYSTOLIC BLOOD PRESSURE: 158 MMHG | WEIGHT: 94 LBS | RESPIRATION RATE: 16 BRPM | TEMPERATURE: 97.8 F | HEART RATE: 82 BPM | HEIGHT: 63 IN | OXYGEN SATURATION: 93 % | BODY MASS INDEX: 16.66 KG/M2

## 2018-01-27 DIAGNOSIS — M54.42 ACUTE LEFT-SIDED LOW BACK PAIN WITH LEFT-SIDED SCIATICA: Primary | ICD-10-CM

## 2018-01-27 DIAGNOSIS — I71.40 ABDOMINAL AORTIC ANEURYSM (AAA) WITHOUT RUPTURE (HCC): ICD-10-CM

## 2018-01-27 PROCEDURE — 72131 CT LUMBAR SPINE W/O DYE: CPT

## 2018-01-27 PROCEDURE — 74176 CT ABD & PELVIS W/O CONTRAST: CPT

## 2018-01-27 PROCEDURE — A9270 NON-COVERED ITEM OR SERVICE: HCPCS | Performed by: EMERGENCY MEDICINE

## 2018-01-27 PROCEDURE — 99284 EMERGENCY DEPT VISIT MOD MDM: CPT

## 2018-01-27 PROCEDURE — 700102 HCHG RX REV CODE 250 W/ 637 OVERRIDE(OP): Performed by: EMERGENCY MEDICINE

## 2018-01-27 PROCEDURE — 700111 HCHG RX REV CODE 636 W/ 250 OVERRIDE (IP): Performed by: EMERGENCY MEDICINE

## 2018-01-27 RX ORDER — OXYCODONE HYDROCHLORIDE AND ACETAMINOPHEN 5; 325 MG/1; MG/1
1-2 TABLET ORAL EVERY 4 HOURS PRN
Qty: 20 TAB | Refills: 0 | Status: SHIPPED | OUTPATIENT
Start: 2018-01-27 | End: 2018-02-01 | Stop reason: SDUPTHER

## 2018-01-27 RX ORDER — IBUPROFEN 200 MG
400 TABLET ORAL EVERY 6 HOURS PRN
Status: DISCONTINUED | OUTPATIENT
Start: 2018-01-27 | End: 2018-01-27 | Stop reason: HOSPADM

## 2018-01-27 RX ORDER — ONDANSETRON 4 MG/1
4 TABLET, ORALLY DISINTEGRATING ORAL ONCE
Status: COMPLETED | OUTPATIENT
Start: 2018-01-27 | End: 2018-01-27

## 2018-01-27 RX ORDER — OXYCODONE HYDROCHLORIDE AND ACETAMINOPHEN 5; 325 MG/1; MG/1
1 TABLET ORAL ONCE
Status: COMPLETED | OUTPATIENT
Start: 2018-01-27 | End: 2018-01-27

## 2018-01-27 RX ADMIN — ONDANSETRON 4 MG: 4 TABLET, ORALLY DISINTEGRATING ORAL at 10:42

## 2018-01-27 RX ADMIN — OXYCODONE HYDROCHLORIDE AND ACETAMINOPHEN 1 TABLET: 5; 325 TABLET ORAL at 10:47

## 2018-01-27 RX ADMIN — IBUPROFEN 400 MG: 200 TABLET, FILM COATED ORAL at 10:47

## 2018-01-27 ASSESSMENT — PAIN SCALES - GENERAL: PAINLEVEL_OUTOF10: 0

## 2018-01-27 NOTE — ED PROVIDER NOTES
ED Provider Note    Scribed for Quentin Gallagher M.D. by Winifred Hoffmann. 1/27/2018, 10:17 AM.    Primary care provider: PRADIP Kebede  Means of arrival: Walk in  History obtained from: Patient  History limited by: None    CHIEF COMPLAINT  Chief Complaint   Patient presents with   • Back Pain       HPI  Mary Murrieta is an 85 y.o. female who presents to the Emergency Department for back pain.  Symptoms developed five days ago with an acute onset of back pain. She denies experiencing any falls, trauma or injuries. No prior history of back pain or injury. She was evaluated at an urgent care on 01/23/18 for this pain and treated with Toradol. She had a lumbar XR completed showing a L4 superior endplate compression fracture. She presents today complaining of constant low back pain radiating to her left lower extremity. Minimal improvement with Advil, hot and cold therapy. She reports taking 400 mg of Advil at 7:00 AM today. Negative groin numbness, bowel or bladder incontinence. History of a stable abdominal aortic aneurysm. She notes lower abdominal discomfort; however, no fever, chills, nausea, vomiting or diarrhea.      REVIEW OF SYSTEMS  Pertinent positives include low back pain, left lower extremity pain and abdominal discomfort. Pertinent negatives include no falls, trauma, injury, groin numbness, bowel or bladder incontinence, fever, chills, nausea, vomiting or diarrhea. As above, all other systems reviewed and are negative. See HPI for further details. C.      PAST MEDICAL HISTORY  Patient has a past medical history of stable AAA; Thyroid disease. No prior history of back pain or injury.      SURGICAL HISTORY  Patient has a past surgical history that includes tonsillectomy (1938); thyroidectomy; and abdominal hysterectomy total (1986).      SOCIAL HISTORY  Social History   Substance Use Topics   • Smoking status: Current Every Day Smoker     Packs/day: 0.50     Years: 50.00     Types:  "Cigarettes   • Smokeless tobacco: Never Used   • Alcohol use 0.0 oz/week      Comment: has a glass of wine 3x a week      History   Drug Use No       FAMILY HISTORY  Family History   Problem Relation Age of Onset   • Cancer Mother    • Cancer Father      esophageal   • Stroke Father    • Lung Disease Sister      emphysema       CURRENT MEDICATIONS  Home Medications    **Home medications have not yet been reviewed for this encounter**         ALLERGIES  None      PHYSICAL EXAM  VITAL SIGNS: BP (!) 177/98   Pulse 91   Temp 36.6 °C (97.8 °F)   Resp 16   Ht 1.6 m (5' 3\")   Wt 42.6 kg (94 lb)   SpO2 93%   BMI 16.65 kg/m²     Vitals reviewed.    Constitutional: Alert in no apparent distress.  HENT: No signs of trauma, Bilateral external ears normal, Nose normal.   Eyes: Pupils are equal and reactive, Conjunctiva normal, Non-icteric.   Neck: Normal range of motion, No tenderness, Supple, No stridor.   Lymphatic: No lymphadenopathy noted.   Cardiovascular: Regular rate and rhythm, no murmurs.   Thorax & Lungs: Normal breath sounds, No respiratory distress, No wheezing, No chest tenderness.   Abdomen: Bowel sounds normal, Soft, Lower abdominal tenderness, No peritoneal signs, No masses, No pulsatile masses.   Skin: Warm, Dry, No erythema, No rash.   Back: No bony tenderness, No CVA tenderness. Tenderness over left sciatic nerve.  Extremities: Intact distal pulses, No edema, No tenderness, No cyanosis  Musculoskeletal: Good range of motion in all major joints. No tenderness to palpation or major deformities noted.   Neurologic: Alert , Normal motor function, Normal sensory function, No focal deficits noted. 5/5 strength to lower extremities bilaterally. No evidence of spinal cord compression.  Psychiatric: Affect normal, Judgment normal, Mood normal.       DIAGNOSTIC STUDIES / PROCEDURES    RADIOLOGY  CT-[facility] W/O PLUS RECONS   Final Result      1.  Mild superior endplate compression fracture at the L4 level is " identified which could be acute or subacute insufficiency fracture. There is minimal indentation of the spinal canal with no significant spinal stenosis at this level.      2.  No other fractures identified.      3.  There is degenerative disc disease facet arthropathy and ligamentum flavum hypertrophy. There is S-shaped scoliosis as described above.      4.  Mild spinal stenosis at the L4-L5 level due to degenerative disc disease facet arthropathy and ligamentum flavum hypertrophy.      CT-RENAL COLIC EVALUATION(A/P W/O)   Final Result         1.  NO URINARY TRACT CALCULI OR OBSTRUCTION.      2.  LARGE INFRARENAL ABDOMINAL AORTIC ANEURYSM MEASURES 4.8 X 4.05 CM. THERE IS EXTENSIVE CALCIFIC ATHEROSCLEROSIS.           The radiologist's interpretation of all radiological studies have been reviewed by me.      COURSE & MEDICAL DECISION MAKING  Pertinent Labs & Imaging studies reviewed. (See chart for details)    Differential Diagnosis include but are not limited to: sciatica or AAA.    10:19 AM Obtained and reviewed patient's electronic medical record which indicates an urgent care visit on 01/23/18 with right sided back pain radiating to left leg and was treated with Toradol. Lumbar spine XR showed L4 superior endplate compression fracture and stable AAA.  US aorta in 2016 indicating an abdominal aortic aneurysm measuring 4.2 x 5.0 cm.    10:21 AM Patient seen and examined at bedside. Patient presents for back pain.  Exam indicates intact strength to lower extremities bilaterally; tenderness to left sciatic nerve and lower abdominal tenderness.      Initial orders in the Emergency Department included CT renal colic and CT lumbar spine.  Initial treatment in the Emergency Department for back pain includes one tablet of 5-325 mg of Percocet PO, 4 mg of Zofran PO and 400 mg of Motrin PO.  She is to remain NPO at this time.  Patient verbalized their understanding and agreement to this plan.    1:19 PM On repeat  "evaluation, pain is improved with the administered medications. Imaging results were discussed as noted above. She will be provided a copy of her CT results to review with her PCP.    Discharge plan was discussed with the patient and includes following up with PRADIP Kebede to discuss physical therapy if pain persists.  Patient will be discharged with a prescription for Percocet.      In prescribing controlled substances to this patient, I certify that I have obtained and reviewed the medical history of Mary Murrieta. I have also made a good ye effort to obtain applicable records from other providers who have treated the patient and records did not demonstrate any increased risk of substance abuse that would prevent me from prescribing controlled substances.     I have conducted a physical exam and documented it. I have reviewed Ms. Murrieta’s prescription history as maintained by the [place] Prescription Monitoring Program.     I have assessed the patient’s risk for abuse, dependency, and addiction using the validated Opioid Risk Tool available at https://www.mdcalc.com/tgvobf-pjgp-lrcn-ort-narcotic-abuse.     Given the above, I believe the benefits of controlled substance therapy outweigh the risks. The reasons for prescribing controlled substances include in my professional opinion, controlled substances are the only reasonable choice for this patient because other pain medications have failed. Accordingly, I have discussed the risk and benefits, treatment plan, and alternative therapies with the patient.     The patient will return for new or persisting symptoms including bladder or bowel incontinence, groin numbness, lower extremity weakness or increased pain.  The patient verbalizes understanding and will comply.  Patient is stable at the time of discharge.  Vital signs were reviewed: BP (!) 177/98   Pulse 91   Temp 36.6 °C (97.8 °F)   Resp 16   Ht 1.6 m (5' 3\")   Wt 42.6 kg (94 lb)   " SpO2 93%   BMI 16.65 kg/m²        DISPOSITION  Patient will be discharged home in stable condition.      FOLLOW UP  [facility], Emergency Dept  [address] [place]  [place] [address] [phone]    If symptoms worsen, numb or weak lower extrmities, incontinence, abdominal pain    PRADIP Kebede  [address]  [place] [address] [phone]      call for follow up      The patient is referred to a primary physician for blood pressure management, diabetic screening, and for all other preventative health concerns.      OUTPATIENT MEDICATIONS  New Prescriptions    OXYCODONE-ACETAMINOPHEN (PERCOCET) 5-325 MG TAB    Take 1-2 Tabs by mouth every four hours as needed (pain) for up to 3 days. No driving, no drinking alcohol       DIAGNOSIS  1. Acute left-sided low back pain with left-sided sciatica    2. Abdominal aortic aneurysm (AAA) without rupture (CMS-HCC)           The note accurately reflects work and decisions made by me.  Quentin Gallagher  1/27/2018  1:29 PM     Winifred KATZ (Scribe), am scribing for, and in the presence of, Quentin Gallagher M.D.    Electronically signed by: Winifred Hoffmann ([place]), 1/27/2018    Quentin KATZ M.D. personally performed the services described in this documentation, as scribed by Winifred Hoffmann in my presence, and it is both accurate and complete.

## 2018-01-27 NOTE — ED TRIAGE NOTES
.  Chief Complaint   Patient presents with   • Low Back Pain     radiates down left leg   to triage by w/c. Pt reports moving furniture on Monday when she injured her back. Denies n/t. Pt has been ambulating at home and applying hot/cold with little relief.

## 2018-01-27 NOTE — DISCHARGE INSTRUCTIONS
Please follow up with a primary physician for blood pressure management, diabetic screening, and all other preventive health concerns.      Abdominal Aortic Aneurysm  Aneurysms are weak places in the wall of an artery. An aneurysm bulges out like a balloon. The aorta is the main vessel in the chest and abdomen. An abdominal aortic aneurysm (AAA) is located in the abdomen. The aorta carries blood from your heart to the rest of the body. An aneurysm may be detected by physical exam. Imaging studies such as x-rays, CT, or ultrasound may also be used. Aneurysms usually develop with aging and the build-up of plaque in the artery wall. You are more likely to develop an aneurysm if you have a family history of aneurysms, if you smoke cigarettes, and\or if you have high blood pressure.  When an abdominal aortic aneurysm is small, it may cause no problems. Those that measure less than 2 inches (5 cm) across may not require immediate surgery. Larger aneurysms are more likely to leak or rupture. When an aneurysm leaks or ruptures, it is a life-threatening emergency. Signs of a leaky or ruptured aneurysm include abdominal, flank, or back pain, severe weakness, fainting, and shock. Some people simply feel like their feet are asleep or their legs look pale. Be sure to follow-up with your caregiver as recommended so your AAA may be properly monitored.   SEEK IMMEDIATE MEDICAL CARE IF:  · You develop severe abdominal, flank or back pain   · You pass out or become light headed   · You suddenly become weak, pale, or sweaty.   · You develop pain in your legs, your legs lose their color or you cannot move them.   · You develop blood in your stools.   · You develop blood in your urine.   Document Released: 12/18/2006 Document Revised: 03/11/2013 Document Reviewed: 05/13/2010  ExitCare® Patient Information ©2013 [employer].  Sciatica  Sciatica is pain, weakness, numbness, or tingling along the path of the sciatic nerve. The nerve  starts in the lower back and runs down the back of each leg. The nerve controls the muscles in the lower leg and in the back of the knee, while also providing sensation to the back of the thigh, lower leg, and the sole of your foot. Sciatica is a symptom of another medical condition. For instance, nerve damage or certain conditions, such as a herniated disk or bone spur on the spine, pinch or put pressure on the sciatic nerve. This causes the pain, weakness, or other sensations normally associated with sciatica. Generally, sciatica only affects one side of the body.  CAUSES   · Herniated or slipped disc.  · Degenerative disk disease.  · A pain disorder involving the narrow muscle in the buttocks (piriformis syndrome).  · Pelvic injury or fracture.  · Pregnancy.  · Tumor (rare).  SYMPTOMS   Symptoms can vary from mild to very severe. The symptoms usually travel from the low back to the buttocks and down the back of the leg. Symptoms can include:  · Mild tingling or dull aches in the lower back, leg, or hip.  · Numbness in the back of the calf or sole of the foot.  · Burning sensations in the lower back, leg, or hip.  · Sharp pains in the lower back, leg, or hip.  · Leg weakness.  · Severe back pain inhibiting movement.  These symptoms may get worse with coughing, sneezing, laughing, or prolonged sitting or standing. Also, being overweight may worsen symptoms.  DIAGNOSIS   Your caregiver will perform a physical exam to look for common symptoms of sciatica. He or she may ask you to do certain movements or activities that would trigger sciatic nerve pain. Other tests may be performed to find the cause of the sciatica. These may include:  · Blood tests.  · X-rays.  · Imaging tests, such as an MRI or CT scan.  TREATMENT   Treatment is directed at the cause of the sciatic pain. Sometimes, treatment is not necessary and the pain and discomfort goes away on its own. If treatment is needed, your caregiver may  suggest:  · Over-the-counter medicines to relieve pain.  · Prescription medicines, such as anti-inflammatory medicine, muscle relaxants, or narcotics.  · Applying heat or ice to the painful area.  · Steroid injections to lessen pain, irritation, and inflammation around the nerve.  · Reducing activity during periods of pain.  · Exercising and stretching to strengthen your abdomen and improve flexibility of your spine. Your caregiver may suggest losing weight if the extra weight makes the back pain worse.  · Physical therapy.  · Surgery to eliminate what is pressing or pinching the nerve, such as a bone spur or part of a herniated disk.  HOME CARE INSTRUCTIONS   · Only take over-the-counter or prescription medicines for pain or discomfort as directed by your caregiver.  · Apply ice to the affected area for 20 minutes, 3-4 times a day for the first 48-72 hours. Then try heat in the same way.  · Exercise, stretch, or perform your usual activities if these do not aggravate your pain.  · Attend physical therapy sessions as directed by your caregiver.  · Keep all follow-up appointments as directed by your caregiver.  · Do not wear high heels or shoes that do not provide proper support.  · Check your mattress to see if it is too soft. A firm mattress may lessen your pain and discomfort.  SEEK IMMEDIATE MEDICAL CARE IF:   · You lose control of your bowel or bladder (incontinence).  · You have increasing weakness in the lower back, pelvis, buttocks, or legs.  · You have redness or swelling of your back.  · You have a burning sensation when you urinate.  · You have pain that gets worse when you lie down or awakens you at night.  · Your pain is worse than you have experienced in the past.  · Your pain is lasting longer than 4 weeks.  · You are suddenly losing weight without reason.  MAKE SURE YOU:  · Understand these instructions.  · Will watch your condition.  · Will get help right away if you are not doing well or get  worse.     This information is not intended to replace advice given to you by your health care provider. Make sure you discuss any questions you have with your health care provider.     Document Released: 12/12/2002 Document Revised: 06/18/2013 Document Reviewed: 04/28/2013  [employer] Interactive Patient Education ©2016 [employer]

## 2018-01-28 ENCOUNTER — PATIENT OUTREACH (OUTPATIENT)
Dept: HEALTH INFORMATION MANAGEMENT | Facility: OTHER | Age: 83
End: 2018-01-28

## 2018-02-01 ENCOUNTER — OFFICE VISIT (OUTPATIENT)
Dept: MEDICAL GROUP | Facility: PHYSICIAN GROUP | Age: 83
End: 2018-02-01
Payer: MEDICARE

## 2018-02-01 VITALS
WEIGHT: 97 LBS | BODY MASS INDEX: 17.19 KG/M2 | HEIGHT: 63 IN | TEMPERATURE: 98.7 F | DIASTOLIC BLOOD PRESSURE: 60 MMHG | OXYGEN SATURATION: 95 % | SYSTOLIC BLOOD PRESSURE: 108 MMHG | HEART RATE: 84 BPM | RESPIRATION RATE: 14 BRPM

## 2018-02-01 DIAGNOSIS — M54.42 ACUTE LEFT-SIDED LOW BACK PAIN WITH LEFT-SIDED SCIATICA: ICD-10-CM

## 2018-02-01 DIAGNOSIS — E78.2 MIXED HYPERLIPIDEMIA: ICD-10-CM

## 2018-02-01 DIAGNOSIS — M54.5 ACUTE LOW BACK PAIN, UNSPECIFIED BACK PAIN LATERALITY, WITH SCIATICA PRESENCE UNSPECIFIED: ICD-10-CM

## 2018-02-01 PROBLEM — M54.50 ACUTE LOW BACK PAIN: Status: ACTIVE | Noted: 2018-02-01

## 2018-02-01 PROCEDURE — 99214 OFFICE O/P EST MOD 30 MIN: CPT | Performed by: NURSE PRACTITIONER

## 2018-02-01 RX ORDER — OXYCODONE HYDROCHLORIDE AND ACETAMINOPHEN 5; 325 MG/1; MG/1
1-2 TABLET ORAL EVERY 4 HOURS PRN
Qty: 20 TAB | Refills: 0 | Status: SHIPPED | OUTPATIENT
Start: 2018-02-01 | End: 2018-02-04

## 2018-02-01 RX ORDER — ATORVASTATIN CALCIUM 10 MG/1
10 TABLET, FILM COATED ORAL
Qty: 90 TAB | Refills: 1 | Status: SHIPPED | OUTPATIENT
Start: 2018-02-01 | End: 2018-03-20

## 2018-02-02 NOTE — PROGRESS NOTES
Chief Complaint   Patient presents with   • Follow-Up     ER & UC fv    • Back Injury     moving a sewing machine   • Medication Refill     atorvastatin       HISTORY OF PRESENT ILLNESS: Patient is a 85 y.o. female established patient who presents today to discuss the following issues:    Acute low back pain  Was recently lifting a sewing machine and hurt her back.  The pain is sharp, left-sided pain and it does not radiate.  She went to urgent care and then the ER.  A CT showed a compression fracture and some indentation on the spinal canal, without stenosis.  She has taken 20 Percocet over 7 days, and the robaxin has not helped at all.  Discussed plan to refer her urgently to [facility], and I will give her 1 more prescription for Percocet #20.        Patient Active Problem List    Diagnosis Date Noted   • Acute low back pain 02/01/2018   • Encounter for 's license history and physical 11/21/2017   • Current smoker 12/07/2016   • Mixed hyperlipidemia 12/07/2016   • Hypothyroidism 02/08/2016       Allergies:Patient has no known allergies.    Current Outpatient Prescriptions   Medication Sig Dispense Refill   • atorvastatin (LIPITOR) 10 MG Tab Take 1 Tab by mouth every day. TAKE 1 TAB BY MOUTH EVERY DAY. 90 Tab 1   • oxyCODONE-acetaminophen (PERCOCET) 5-325 MG Tab Take 1-2 Tabs by mouth every four hours as needed (pain) for up to 3 days. No driving, no drinking alcohol 20 Tab 0   • methocarbamol (ROBAXIN) 750 MG Tab Take 1 Tab by mouth 3 times a day. 60 Tab 0   • budesonide-formoterol (SYMBICORT) 160-4.5 MCG/ACT Aerosol Inhale 2 Puffs by mouth 2 Times a Day. 3 Inhaler 1   • levothyroxine (SYNTHROID) 112 MCG Tab TAKE 1 TAB BY MOUTH EVERY MORNING ON AN EMPTY STOMACH. 90 Tab 2   • aspirin EC (ECOTRIN) 81 MG Tablet Delayed Response Take 81 mg by mouth every day.     • omeprazole (PRILOSEC) 20 MG delayed-release capsule Take 20 mg by mouth every day.       No current facility-administered medications for this  "visit.        Social History   Substance Use Topics   • Smoking status: Current Every Day Smoker     Packs/day: 0.50     Years: 50.00     Types: Cigarettes   • Smokeless tobacco: Never Used   • Alcohol use 0.6 oz/week     1 Glasses of wine per week       Family Status   Relation Status   • Mother    • Father    • Sister    • Sister    • Sister      Family History   Problem Relation Age of Onset   • Cancer Mother    • Cancer Father      esophageal   • Stroke Father    • Lung Disease Sister      emphysema       Review of Systems:   Constitutional: Negative for fever, chills, weight loss and malaise/fatigue.   HENT: Negative for ear pain, nosebleeds, congestion, sore throat and neck pain.    Eyes: Negative for blurred vision.   Respiratory: Negative for cough, sputum production, shortness of breath and wheezing.    Cardiovascular: Negative for chest pain, palpitations, orthopnea and leg swelling.   Gastrointestinal: Negative for heartburn, nausea, vomiting and abdominal pain.   Genitourinary: Negative for dysuria, urgency and frequency.   Musculoskeletal: Negative for myalgias and joint pain.  Positive for acute left-sided low back pain.  Skin: Negative for rash and itching.   Neurological: Negative for dizziness, tingling, tremors, sensory change, focal weakness and headaches.   Endo/Heme/Allergies: Does not bruise/bleed easily.   Psychiatric/Behavioral: Negative for depression, suicidal ideas and memory loss.  The patient is not nervous/anxious and does not have insomnia.    All other systems reviewed and are negative except as in HPI.    Exam:  Blood pressure 108/60, pulse 84, temperature 37.1 °C (98.7 °F), resp. rate 14, height 1.6 m (5' 3\"), weight 44 kg (97 lb), SpO2 95 %.  General:  Well nourished, well developed female in NAD  Head: Grossly normal.  Neck: Supple without JVD or bruit. Thyroid is not enlarged.  Pulmonary: Clear to ausculation. Normal effort. No rales, " ronchi, or wheezing.  Cardiovascular: Regular rate and rhythm without murmur.   Abdomen:  Soft, nontender, nondistended.  Back:  Tender over left lumbar spine.  Extremities: No clubbing, cyanosis, or edema.  Skin: Intact with no obvious rashes or lesions.  Neuro: Grossly intact.  Psych: Alert and oriented x 3.  Mood and affect appropriate.    Medical decision-making and discussion: Mary is here today for follow-up of acute low back pain.  An urgent referral was sent to [facility], and she was given a prescription for Percocet #20.  She will follow-up here as needed.          Assessment/Plan:  1. Acute low back pain, unspecified back pain laterality, with sciatica presence unspecified  REFERRAL TO NEUROSURGERY    CONSENT FOR OPIATE PRESCRIPTION   2. Acute left-sided low back pain with left-sided sciatica  oxyCODONE-acetaminophen (PERCOCET) 5-325 MG Tab   3. Mixed hyperlipidemia  atorvastatin (LIPITOR) 10 MG Tab       Return if symptoms worsen or fail to improve.    Please note that this dictation was created using voice recognition software. I have made every reasonable attempt to correct obvious errors, but I expect that there are errors of grammar and possibly content that I did not discover before finalizing the note.

## 2018-02-02 NOTE — ASSESSMENT & PLAN NOTE
Was recently lifting a sewing machine and hurt her back.  The pain is sharp, left-sided pain and it does not radiate.  She went to urgent care and then the ER.  A CT showed a compression fracture and some indentation on the spinal canal, without stenosis.  She has taken 20 Percocet over 7 days, and the robaxin has not helped at all.  Discussed plan to refer her urgently to [facility], and I will give her 1 more prescription for Percocet #20.

## 2018-02-13 ENCOUNTER — TELEPHONE (OUTPATIENT)
Dept: MEDICAL GROUP | Facility: PHYSICIAN GROUP | Age: 83
End: 2018-02-13

## 2018-03-06 ENCOUNTER — HOSPITAL ENCOUNTER (OUTPATIENT)
Dept: RADIOLOGY | Facility: MEDICAL CENTER | Age: 83
End: 2018-03-06
Attending: PHYSICIAN ASSISTANT
Payer: MEDICARE

## 2018-03-06 DIAGNOSIS — M54.16 LUMBAR RADICULOPATHY: ICD-10-CM

## 2018-03-06 DIAGNOSIS — M25.552 LEFT HIP PAIN: ICD-10-CM

## 2018-03-06 PROCEDURE — 73700 CT LOWER EXTREMITY W/O DYE: CPT | Mod: LT

## 2018-03-06 PROCEDURE — 72148 MRI LUMBAR SPINE W/O DYE: CPT

## 2018-03-14 ENCOUNTER — HOSPITAL ENCOUNTER (OUTPATIENT)
Dept: RADIOLOGY | Facility: MEDICAL CENTER | Age: 83
End: 2018-03-14
Attending: PHYSICIAN ASSISTANT

## 2018-03-14 DIAGNOSIS — S32.040D OPEN WEDGE COMPRESSION FRACTURE OF FOURTH LUMBAR VERTEBRA WITH ROUTINE HEALING, SUBSEQUENT ENCOUNTER: ICD-10-CM

## 2018-03-14 NOTE — CONSULTS
"Interventional Radiology Consultation      Re: Mary Murrieta     MRN: [id number]   : 1932    Mary Murrieta was referred by Isi Akins PA-C.  She is a 85 y.o. female seen in clinic for evaluation and possible vertebral augmentation. She has an upcoming appointment to establish primary care with Jayce Barron MD.     History of Present Illness:   presented with acute low back pain after moving furniture in her home on . She felt the immediate onset of pain that \"put her on the floor.\" The pain radiates to the left leg and is located in her low back at the same level as her hip bones. She denies numbness in the pelvis or bowel or bladder incontinence. She presented to Urgent Care on  and was treated with toradol and sent home with Advil and Robaxin. She then presented to ED on  when the pain progressed and imaging showed a compression fracture at L4. She was discharged with Percocet and referred to to neurosurgery, who are planning a L4-L5 transforaminal injection to help her leg pain. She is referred to interventional radiology for evaluation and management of the painful vertebral compression fracture at L4. Today, 's pain is 5/10. Worst pain in the past 24 hours is 5/10. The pain is constant. Medications and rest makes the pain better. She typically takes one tramadol daily in the morning. Standing, walking, and bending make the pain worse. She is not able to perform activities of daily living due to the pain, specifically washing dishes and meal preparation. She had not been driving her car until yesterday due to the pain. Her daughter, who usually helps her, is in [place] for a month. Most concerning is her poor appetite and unintentional weight loss from 93 to 87 pounds since the onset of pain 6 weeks ago. She does not recall being tested for osteoporosis and is not on therapy. Her PCP has moved and she is waiting to establish with another provider. She is " a current smoker of 1 pack daily for approximately 50 years.     She is seen today for review of imaging studies and discussion of possible vertebral augmentation of a compression fracture at L4.     Past Medical History:   Diagnosis Date   • Thyroid disease      Past Surgical History:   Procedure Laterality Date   • ABDOMINAL HYSTERECTOMY TOTAL  1986   • TONSILLECTOMY  1938   • THYROIDECTOMY      1976     Social History     Social History   • Marital status:      Spouse name: N/A   • Number of children: N/A   • Years of education: N/A     Occupational History   • Not on file.     Social History Main Topics   • Smoking status: Current Every Day Smoker     Packs/day: 0.50     Years: 50.00     Types: Cigarettes   • Smokeless tobacco: Never Used   • Alcohol use 0.6 oz/week     1 Glasses of wine per week   • Drug use: No   • Sexual activity: No     Other Topics Concern   • Not on file     Social History Narrative   • No narrative on file     Family History   Problem Relation Age of Onset   • Cancer Mother    • Cancer Father      esophageal   • Stroke Father    • Lung Disease Sister      emphysema       Review of Systems:  Constitutional: positive for anorexia and weight loss, negative for fevers  Ears, nose, mouth, throat, and face: positive for hearing loss  Respiratory: positive for emphysema  Gastrointestinal: negative for incontinence  Genitourinary:negative for urinary incontinence  Musculoskeletal:positive for back pain, bone pain and muscle weakness  Neurological: positive for left leg pain    A comprehensive 14-point review of systems was negative except as described above.     Labs:      Ref. Range 3/23/2017 07:35 9/20/2017 14:24   WBC Latest Ref Range: 4.8 - 10.8 K/uL 5.1    RBC Latest Ref Range: 4.20 - 5.40 M/uL 5.09    Hemoglobin Latest Ref Range: 12.0 - 16.0 g/dL 15.1    Hematocrit Latest Ref Range: 37.0 - 47.0 % 47.1 (H)    MCV Latest Ref Range: 81.4 - 97.8 fL 92.5    MCH Latest Ref Range: 27.0 -  33.0 pg 29.7    MCHC Latest Ref Range: 33.6 - 35.0 g/dL 32.1 (L)    RDW Latest Ref Range: 35.9 - 50.0 fL 50.6 (H)    Platelet Count Latest Ref Range: 164 - 446 K/uL 133 (L)    MPV Latest Ref Range: 9.0 - 12.9 fL 12.5    Sodium Latest Ref Range: 135 - 145 mmol/L 141 140   Potassium Latest Ref Range: 3.6 - 5.5 mmol/L 3.8 4.1   Chloride Latest Ref Range: 96 - 112 mmol/L 106 102   Co2 Latest Ref Range: 20 - 33 mmol/L 27 26   Anion Gap Latest Ref Range: 0.0 - 11.9  8.0 12.0 (H)   Glucose Latest Ref Range: 65 - 99 mg/dL 92 89   Bun Latest Ref Range: 8 - 22 mg/dL 15 15   Creatinine Latest Ref Range: 0.50 - 1.40 mg/dL 0.86 0.85   GFR If  Latest Ref Range: >60 mL/min/1.73 m 2 >60 >60   GFR If Non  Latest Ref Range: >60 mL/min/1.73 m 2 >60 >60   Calcium Latest Ref Range: 8.5 - 10.5 mg/dL 9.5 9.7   AST(SGOT) Latest Ref Range: 12 - 45 U/L 18 18   ALT(SGPT) Latest Ref Range: 2 - 50 U/L 9 9   Alkaline Phosphatase Latest Ref Range: 30 - 99 U/L 85 96   Total Bilirubin Latest Ref Range: 0.1 - 1.5 mg/dL 0.6 0.7   Albumin Latest Ref Range: 3.2 - 4.9 g/dL 4.0 4.5   Total Protein Latest Ref Range: 6.0 - 8.2 g/dL 7.3 7.6   Globulin Latest Ref Range: 1.9 - 3.5 g/dL 3.3 3.1   A-G Ratio Latest Units: g/dL 1.2 1.5   Cholesterol,Tot Latest Ref Range: 100 - 199 mg/dL 189    Triglycerides Latest Ref Range: 0 - 149 mg/dL 98    HDL Latest Ref Range: >=40 mg/dL 61    LDL Latest Ref Range: <100 mg/dL 108 (H)    TSH Latest Ref Range: 0.300 - 3.700 uIU/mL 4.250 (H) 1.550   Free T-4 Latest Ref Range: 0.53 - 1.43 ng/dL 1.46 (H)        Radiology:   MRI lumbar spine on March 6, 2018 at [facility]:  1.  Mild acute butterfly type compression fracture of the L4 vertebral body amenable to vertebral augmentation.  2.  Sigmoid scoliosis of the lumbar spine with greatest curvature to the left in the upper lumbar region. Further there is marked lateral subluxation at the L2-3 level.  3.  Mild central canal stenosis at the L2-3 level  and borderline central canal stenosis at the L3-4 level secondary to facet arthropathy.  4.  Minimal to mild multilevel lumbar spondylotic change.  5.  Severe right-sided neural foraminal stenosis with disc bulging into the inferior aspect of the right-sided neural foramina at the L2-3 level.  6.  Mild to moderate right-sided neural foraminal stenosis at the L1-2 level.      CT hip March 6, 2018 at [facility]:  Orthopedic fixation proximal left femur appears stable. Deformity left femoral head consistent with old fracture. No acute fracture identified.  No hip joint dislocation.  Increasing compression of the L4 vertebral body and retropulsed fragments compromising the spinal canal by 30% since 1/27/2018.  Infrarenal normal aortic aneurysm incompletely imaged.    CT lumbar spine January 27, 2018 at [facility]:  1.  Mild superior endplate compression fracture at the L4 level is identified which could be acute or subacute insufficiency fracture. There is minimal indentation of the spinal canal with no significant spinal stenosis at this level.  2.  No other fractures identified.  3.  There is degenerative disc disease facet arthropathy and ligamentum flavum hypertrophy. There is S-shaped scoliosis as described above.  4.  Mild spinal stenosis at the L4-L5 level due to degenerative disc disease facet arthropathy and ligamentum flavum hypertrophy.    CT renal colic January 27, 2018 at [facility]:  1.  NO URINARY TRACT CALCULI OR OBSTRUCTION.  2.  LARGE INFRARENAL ABDOMINAL AORTIC ANEURYSM MEASURES 4.8 X 4.05 CM. THERE IS EXTENSIVE CALCIFIC ATHEROSCLEROSIS.    Xray lumbar spine 2V January 23, 2018 at [facility]:  1.  There is an age-indeterminate mild L4 superior endplate compression fracture.  2.  There is diffuse osteopenia.  3.  There is multilevel degenerative change with scoliosis.  4.  Infrarenal abdominal aortic aneurysm is stable in size compared with previous ultrasound.      Current Outpatient Prescriptions   Medication Sig  Dispense Refill   • atorvastatin (LIPITOR) 10 MG Tab Take 1 Tab by mouth every day. TAKE 1 TAB BY MOUTH EVERY DAY. 90 Tab 1   • methocarbamol (ROBAXIN) 750 MG Tab Take 1 Tab by mouth 3 times a day. 60 Tab 0   • budesonide-formoterol (SYMBICORT) 160-4.5 MCG/ACT Aerosol Inhale 2 Puffs by mouth 2 Times a Day. 3 Inhaler 1   • levothyroxine (SYNTHROID) 112 MCG Tab TAKE 1 TAB BY MOUTH EVERY MORNING ON AN EMPTY STOMACH. 90 Tab 2   • aspirin EC (ECOTRIN) 81 MG Tablet Delayed Response Take 81 mg by mouth every day.     • omeprazole (PRILOSEC) 20 MG delayed-release capsule Take 20 mg by mouth every day.       No current facility-administered medications for this encounter.        No Known Allergies    Physical Exam:  General Appearance: alert, mild distress, cooperative, thin appearing  Skin: negatives: color normal, temperature normal  Lungs: negative findings: normal respiratory rate and rhythm  Neurologic: oriented x 4  There is focal pain of the vertebral body at L4.  Karnofsky score 50    Impression:   1. Acute L4 vertebral compression fracture, amenable to vertebral augmentation.  2. Acute low back pain.   3. Hypothyroid.   4. Tobacco dependence.   5. Osteopenia lumbar spine determined by xray.    Plan:   Momo Dyer MD has reviewed 's history and imaging studies, examined the patient, and discussed treatment options.  is a candidate for vertebral augmentation. The acute low back pain the patient describes is directly related to the fracture. The leg pain will be addressed by [facility]. We discussed the method of the procedure at length. We additionally discussed the risks, including bleeding and infection, reaction to the moderate sedation medications, and cement extravasation causing compression of a nerve or the spinal canal or pulmonary embolus and subsequent interventions. There is a small chance the procedure will not alleviate the back pain. The patient may be at risk to develop future  compression fractures. We discussed alternatives of the procedure including conservative medical management. The patient verbalizes understanding of the plan and elects to proceed. She will contact us to set an appointment once her aftercare is arranged. We have planned to use anesthesia services during this procedure. We asked her to follow up at [facility] or with her PCP regarding the osteopenia seen on imaging and potential medical management. She was encouraged to make an effort to increase her caloric intake with more calorie dense foods and try small frequent meals.       ROBEL Main with Momo Dyer MD  Interventional Radiology  [place] [id number] [facility] ([id number])  [place] [address]  [phone]

## 2018-03-15 ENCOUNTER — PATIENT OUTREACH (OUTPATIENT)
Dept: HEALTH INFORMATION MANAGEMENT | Facility: OTHER | Age: 83
End: 2018-03-15

## 2018-03-15 NOTE — PROGRESS NOTES
1. Attempt #: 1    2. [facility] Verified: yes    3. Verify PCP: yes    4. Care Team Updated:       •   DME Company (gait device, O2, CPAP, etc.): YES       •   Other Specialists (eye doctor, derm, GYN, cardiology, endo, etc): YES    5.  Reviewed/Updated the following with patient:       •   Communication Preference Obtained? YES       •   Preferred Pharmacy? YES       •   Preferred Lab? YES       •   Family History (document living status of immediate family members and if + hx of cancer, diabetes, hypertension, hyperlipidemia, heart attack, stroke) YES. Was Abstract Encounter opened and chart updated? YES    6. [employer] Activation: already active    7. [facility] [employer] [place]: no    8. Annual Wellness Visit Scheduling  Scheduling Status:Scheduled      9. Care Gap Scheduling (Attempt to Schedule EACH Overdue Care Gap!)     Health Maintenance Due   Topic Date Due   • IMM DTaP/Tdap/Td Vaccine (1 - Tdap) 12/25/1951   • COLON CANCER SCREENING ANNUAL FIT  12/25/1982   • IMM ZOSTER VACCINE  12/25/1992   • Annual Wellness Visit  12/08/2017        Scheduled patient for Annual Wellness Visit    10. Patient was advised: “This is a free wellness visit. The provider will screen for medical conditions to help you stay healthy. If you have other concerns to address you may be asked to discuss these at a separate visit or there may be an additional fee.”     11. Patient was informed to arrive 15 min prior to their scheduled appointment and bring in their medication bottles.

## 2018-03-20 DIAGNOSIS — Z01.810 PRE-OPERATIVE CARDIOVASCULAR EXAMINATION: ICD-10-CM

## 2018-03-20 DIAGNOSIS — Z01.812 PRE-OPERATIVE LABORATORY EXAMINATION: ICD-10-CM

## 2018-03-20 LAB
ANION GAP SERPL CALC-SCNC: 8 MMOL/L (ref 0–11.9)
BUN SERPL-MCNC: 22 MG/DL (ref 8–22)
CALCIUM SERPL-MCNC: 9.5 MG/DL (ref 8.5–10.5)
CHLORIDE SERPL-SCNC: 103 MMOL/L (ref 96–112)
CO2 SERPL-SCNC: 27 MMOL/L (ref 20–33)
CREAT SERPL-MCNC: 0.9 MG/DL (ref 0.5–1.4)
EKG IMPRESSION: NORMAL
ERYTHROCYTE [DISTWIDTH] IN BLOOD BY AUTOMATED COUNT: 53.3 FL (ref 35.9–50)
GLUCOSE SERPL-MCNC: 100 MG/DL (ref 65–99)
HCT VFR BLD AUTO: 44.9 % (ref 37–47)
HGB BLD-MCNC: 15.2 G/DL (ref 12–16)
INR PPP: 1.05 (ref 0.87–1.13)
MCH RBC QN AUTO: 31.7 PG (ref 27–33)
MCHC RBC AUTO-ENTMCNC: 33.9 G/DL (ref 33.6–35)
MCV RBC AUTO: 93.7 FL (ref 81.4–97.8)
PLATELET # BLD AUTO: 145 K/UL (ref 164–446)
PMV BLD AUTO: 11 FL (ref 9–12.9)
POTASSIUM SERPL-SCNC: 3.8 MMOL/L (ref 3.6–5.5)
PROTHROMBIN TIME: 13.4 SEC (ref 12–14.6)
RBC # BLD AUTO: 4.79 M/UL (ref 4.2–5.4)
SODIUM SERPL-SCNC: 138 MMOL/L (ref 135–145)
WBC # BLD AUTO: 7.1 K/UL (ref 4.8–10.8)

## 2018-03-20 PROCEDURE — 93010 ELECTROCARDIOGRAM REPORT: CPT | Performed by: INTERNAL MEDICINE

## 2018-03-20 PROCEDURE — 36415 COLL VENOUS BLD VENIPUNCTURE: CPT

## 2018-03-20 PROCEDURE — 80048 BASIC METABOLIC PNL TOTAL CA: CPT

## 2018-03-20 PROCEDURE — 93005 ELECTROCARDIOGRAM TRACING: CPT

## 2018-03-20 PROCEDURE — 85027 COMPLETE CBC AUTOMATED: CPT

## 2018-03-20 PROCEDURE — 85610 PROTHROMBIN TIME: CPT

## 2018-03-20 RX ORDER — TRAMADOL HYDROCHLORIDE 50 MG/1
50 TABLET ORAL EVERY 4 HOURS PRN
COMMUNITY
End: 2019-03-03

## 2018-03-20 RX ORDER — GABAPENTIN 100 MG/1
100 CAPSULE ORAL DAILY
COMMUNITY
End: 2019-05-28 | Stop reason: SDUPTHER

## 2018-03-21 ENCOUNTER — HOSPITAL ENCOUNTER (OUTPATIENT)
Facility: MEDICAL CENTER | Age: 83
End: 2018-03-21
Attending: RADIOLOGY | Admitting: RADIOLOGY
Payer: MEDICARE

## 2018-03-21 ENCOUNTER — APPOINTMENT (OUTPATIENT)
Dept: RADIOLOGY | Facility: MEDICAL CENTER | Age: 83
End: 2018-03-21
Attending: RADIOLOGY
Payer: MEDICARE

## 2018-03-21 VITALS
WEIGHT: 87.3 LBS | SYSTOLIC BLOOD PRESSURE: 143 MMHG | RESPIRATION RATE: 16 BRPM | OXYGEN SATURATION: 88 % | HEART RATE: 76 BPM | TEMPERATURE: 97.8 F | DIASTOLIC BLOOD PRESSURE: 70 MMHG | HEIGHT: 63 IN | BODY MASS INDEX: 15.47 KG/M2

## 2018-03-21 DIAGNOSIS — S32.040G CLOSED COMPRESSION FRACTURE OF L4 LUMBAR VERTEBRA, WITH DELAYED HEALING, SUBSEQUENT ENCOUNTER: ICD-10-CM

## 2018-03-21 PROCEDURE — 700101 HCHG RX REV CODE 250

## 2018-03-21 PROCEDURE — 22514 PERQ VERTEBRAL AUGMENTATION: CPT

## 2018-03-21 PROCEDURE — 700111 HCHG RX REV CODE 636 W/ 250 OVERRIDE (IP)

## 2018-03-21 PROCEDURE — 160002 HCHG RECOVERY MINUTES (STAT)

## 2018-03-21 RX ORDER — MIDAZOLAM HYDROCHLORIDE 1 MG/ML
INJECTION INTRAMUSCULAR; INTRAVENOUS
Status: DISCONTINUED
Start: 2018-03-21 | End: 2018-03-21 | Stop reason: HOSPADM

## 2018-03-21 RX ORDER — HYDROMORPHONE HYDROCHLORIDE 2 MG/ML
0.5 INJECTION, SOLUTION INTRAMUSCULAR; INTRAVENOUS; SUBCUTANEOUS
Status: DISCONTINUED | OUTPATIENT
Start: 2018-03-21 | End: 2018-03-21 | Stop reason: HOSPADM

## 2018-03-21 RX ORDER — OXYCODONE HYDROCHLORIDE 5 MG/1
5 TABLET ORAL
Status: DISCONTINUED | OUTPATIENT
Start: 2018-03-21 | End: 2018-03-21 | Stop reason: HOSPADM

## 2018-03-21 RX ORDER — OXYCODONE HYDROCHLORIDE 5 MG/1
10 TABLET ORAL
Status: DISCONTINUED | OUTPATIENT
Start: 2018-03-21 | End: 2018-03-21 | Stop reason: HOSPADM

## 2018-03-21 RX ORDER — ONDANSETRON 2 MG/ML
4 INJECTION INTRAMUSCULAR; INTRAVENOUS EVERY 8 HOURS PRN
Status: DISCONTINUED | OUTPATIENT
Start: 2018-03-21 | End: 2018-03-21 | Stop reason: HOSPADM

## 2018-03-21 ASSESSMENT — PAIN SCALES - GENERAL
PAINLEVEL_OUTOF10: 0

## 2018-03-21 NOTE — OR NURSING
1436 Pt report received from IR nurse, pt brought over by RN on [facility], report given by anesthesiologist. Pt placed on monitor, VSS, family at bedside. Pts incision site is clean, dry and soft, no S/S of bleeding. Pt was given water and a snack. Pt has bedrest orders until 1730    1500 pt is resting comfortably in bed no C/O pain, VSS, incision site is clean dry and soft.     1743 pt and family given D/C instructions, pt and family verbalized understanding. Pt going home with family in a wheelchair.

## 2018-03-21 NOTE — OR SURGEON
Immediate Post- Operative Note        PostOp Diagnosis: L4 COMPRESSION FX    Procedure(s):  L4 KYPHOPLASTY    Estimated Blood Loss: Less than 5 ml        Complications: None            3/21/2018     1:40 PM     Momo Dyer

## 2018-03-21 NOTE — PROGRESS NOTES
IR Procedure RN's Note:    L4 kyphoplasty done by Dr. Dyer; Dr. Brewer provided GA and monitored the patient throughout the procedure; mid lower back access site; pt assessed upon arrival, A/Ox4, consented by both MD, and pt aware of the procedure. KYPHON Xpeded Bone cement injected into the L4 compression fracture by MD REF#[id number] LOT#[id number]; pt appears comfortable throughout the procedure with no signs of distress; access site CDI, soft with no bleeding or hematoma, gauze and tegaderm for dressing; telephone report given to Leti CARDOSO; pt and family taken to [facility] for recovery.

## 2018-03-21 NOTE — DISCHARGE INSTRUCTIONS
ACTIVITY: Rest and take it easy for the first 24 hours.  A responsible adult is recommended to remain with you during that time.  It is normal to feel sleepy.  We encourage you to not do anything that requires balance, judgment or coordination.    MILD FLU-LIKE SYMPTOMS ARE NORMAL. YOU MAY EXPERIENCE GENERALIZED MUSCLE ACHES, THROAT IRRITATION, HEADACHE AND/OR SOME NAUSEA.    FOR 24 HOURS DO NOT:  Drive, operate machinery or run household appliances.  Drink beer or alcoholic beverages.   Make important decisions or sign legal documents.    SPECIAL INSTRUCTIONS: keep incision dry for 24 hours    DIET: To avoid nausea, slowly advance diet as tolerated, avoiding spicy or greasy foods for the first day.  Add more substantial food to your diet according to your physician's instructions.  Babies can be fed formula or breast milk as soon as they are hungry.  INCREASE FLUIDS AND FIBER TO AVOID CONSTIPATION.    SURGICAL DRESSING/BATHING: do not shower for 24 hours, may shower tomorrow 3/22/2018     FOLLOW-UP APPOINTMENT:  A follow-up appointment should be arranged with your doctor at [facility] at [phone]; call to schedule.    You should CALL YOUR PHYSICIAN if you develop:  Fever greater than 101 degrees F.  Pain not relieved by medication, or persistent nausea or vomiting.  Excessive bleeding (blood soaking through dressing) or unexpected drainage from the wound.  Extreme redness or swelling around the incision site, drainage of pus or foul smelling drainage.  Inability to urinate or empty your bladder within 8 hours.  Problems with breathing or chest pain.    You should call 911 if you develop problems with breathing or chest pain.  If you are unable to contact your doctor or surgical center, you should go to the nearest emergency room or urgent care center.  Physician's telephone #: [phone]    If any questions arise, call your doctor.  If your doctor is not available, please feel free to call the Surgical  Center at [phone].  The Center is open Monday through Friday from 7AM to 7PM.  You can also call the HEALTH HOTLINE open 24 hours/day, 7 days/week and speak to a nurse at [phone], or toll free at [phone].    A registered nurse may call you a few days after your surgery to see how you are doing after your procedure.    MEDICATIONS: Resume taking daily medication.  Take prescribed pain medication with food.  If no medication is prescribed, you may take non-aspirin pain medication if needed.  PAIN MEDICATION CAN BE VERY CONSTIPATING.  Take a stool softener or laxative such as senokot, pericolace, or milk of magnesia if needed.    If your physician has prescribed pain medication that includes Acetaminophen (Tylenol), do not take additional Acetaminophen (Tylenol) while taking the prescribed medication.    Depression / Suicide Risk    As you are discharged from this [facility] facility, it is important to learn how to keep safe from harming yourself.    Recognize the warning signs:  · Abrupt changes in personality, positive or negative- including increase in energy   · Giving away possessions  · Change in eating patterns- significant weight changes-  positive or negative  · Change in sleeping patterns- unable to sleep or sleeping all the time   · Unwillingness or inability to communicate  · Depression  · Unusual sadness, discouragement and loneliness  · Talk of wanting to die  · Neglect of personal appearance   · Rebelliousness- reckless behavior  · Withdrawal from people/activities they love  · Confusion- inability to concentrate     If you or a loved one observes any of these behaviors or has concerns about self-harm, here's what you can do:  · Talk about it- your feelings and reasons for harming yourself  · Remove any means that you might use to hurt yourself (examples: pills, rope, extension cords, firearm)  · Get professional help from the community (Mental Health, Substance Abuse,  psychological counseling)  · Do not be alone:Call your Safe Contact- someone whom you trust who will be there for you.  · Call your local CRISIS HOTLINE 860-4326 or 133-813-2353  · Call your local Children's Mobile Crisis Response Team [place] (393) 770-4345 or www.Outsmart  · Call the toll free National Suicide Prevention Hotlines   · National Suicide Prevention Lifeline 264-074-XMBH (0163)  · National Hope Line Network 800-SUICIDE (463-0606)

## 2018-03-22 DIAGNOSIS — S32.040G CLOSED COMPRESSION FRACTURE OF L4 LUMBAR VERTEBRA, WITH DELAYED HEALING, SUBSEQUENT ENCOUNTER: ICD-10-CM

## 2018-03-26 ENCOUNTER — TELEPHONE (OUTPATIENT)
Dept: RADIOLOGY | Facility: MEDICAL CENTER | Age: 83
End: 2018-03-26

## 2018-03-26 NOTE — TELEPHONE ENCOUNTER
Pt contacted APRN with a concern after L4 kyphoplasty 3/21/18 with Dr. Dyer. Has noticed a nontender lump at surgical site. It is smaller than a dime in size and she does not think it is oozing. She is unable to visualize the lump and states it is not increasing in size. Her preprocedure pain is still present in same location but at a slightly lesser intensity. It is not resolved. She denies fevers.     Discussed with pt that this may represent a kyphoplasty incision site. It is asymptomatic from the patient's report and she will have her daughter look at it tomorrow. Reviewed s/s of infection and when to seek care. Also reviewed clinical expectations following vertebral augmentation and that the pain can sometimes improve quickly or it can take a few weeks to improve. Will see pt in clinic for routine f/u or sooner if she develops symptoms from described lump of redness, drainage, pain, increase in size, or fever.

## 2018-03-28 ENCOUNTER — OFFICE VISIT (OUTPATIENT)
Dept: MEDICAL GROUP | Facility: PHYSICIAN GROUP | Age: 83
End: 2018-03-28
Payer: MEDICARE

## 2018-03-28 VITALS
HEART RATE: 83 BPM | TEMPERATURE: 97.5 F | RESPIRATION RATE: 14 BRPM | SYSTOLIC BLOOD PRESSURE: 110 MMHG | OXYGEN SATURATION: 97 % | WEIGHT: 88 LBS | DIASTOLIC BLOOD PRESSURE: 60 MMHG | HEIGHT: 63 IN | BODY MASS INDEX: 15.59 KG/M2

## 2018-03-28 DIAGNOSIS — E89.0 POSTOPERATIVE HYPOTHYROIDISM: ICD-10-CM

## 2018-03-28 DIAGNOSIS — E46 PROTEIN-CALORIE MALNUTRITION, UNSPECIFIED SEVERITY (HCC): ICD-10-CM

## 2018-03-28 DIAGNOSIS — S32.040S CLOSED COMPRESSION FRACTURE OF FOURTH LUMBAR VERTEBRA, SEQUELA: ICD-10-CM

## 2018-03-28 DIAGNOSIS — E78.2 MIXED HYPERLIPIDEMIA: ICD-10-CM

## 2018-03-28 DIAGNOSIS — R23.3 EASY BRUISING: ICD-10-CM

## 2018-03-28 DIAGNOSIS — I71.9 AORTIC ANEURYSM WITHOUT RUPTURE, UNSPECIFIED PORTION OF AORTA (HCC): ICD-10-CM

## 2018-03-28 PROBLEM — S32.040A COMPRESSION FRACTURE OF FOURTH LUMBAR VERTEBRA (HCC): Status: ACTIVE | Noted: 2018-03-28

## 2018-03-28 PROCEDURE — 99204 OFFICE O/P NEW MOD 45 MIN: CPT | Performed by: INTERNAL MEDICINE

## 2018-03-28 RX ORDER — ATORVASTATIN CALCIUM 10 MG/1
10 TABLET, FILM COATED ORAL NIGHTLY
COMMUNITY
End: 2018-08-14

## 2018-03-28 NOTE — PROGRESS NOTES
PRIMARY CARE CLINIC NEW PATIENT H&P  Chief Complaint   Patient presents with   • Back Pain     injured back, medication refils      History of Present Illness     Presents with daughter Lynette to establish care:     Compression fracture of fourth lumbar vertebra (CMS-HCC)  Following with [facility], where her tramadol is prescribed. Underwent L4 Kyphoplasty on 3/21/2018. Still having low back pain and wants her site examined.     Hypothyroidism  Stable on levothyroxine 112 mcg.     Mixed hyperlipidemia  Stable on atorvastatin, unclear what dose.     Easy bruising  Has been noting easy bruising of her lower extremities but denies trauma.     Protein-calorie malnutrition (CMS-HCC)  Her daughter has been trying to give her at least one Boost day, patient cannot take more. She is reporting poor appetite. Lynette, her daughter who is present today, is trying to ensure she has protein enriched meals and lessen her intake of candy.     Current Outpatient Prescriptions   Medication Sig Dispense Refill   • atorvastatin (LIPITOR) 10 MG Tab Take 10 mg by mouth every evening.     • gabapentin (NEURONTIN) 100 MG Cap Take 100 mg by mouth 3 times a day.     • tramadol (ULTRAM) 50 MG Tab Take 50 mg by mouth every four hours as needed.     • levothyroxine (SYNTHROID) 112 MCG Tab TAKE 1 TAB BY MOUTH EVERY MORNING ON AN EMPTY STOMACH. 90 Tab 2   • aspirin EC (ECOTRIN) 81 MG Tablet Delayed Response Take 81 mg by mouth every day.     • omeprazole (PRILOSEC) 20 MG delayed-release capsule Take 20 mg by mouth every day.       No current facility-administered medications for this visit.        Past Medical History:   Diagnosis Date   • Aortic aneurysm without rupture (CMS-[facility])    • Cataract     +surgery bilat   • Compression fracture of fourth lumbar vertebra (CMS-HCC) 3/28/2018   • Dental disorder 03/20/2018    upper and lower   • Emphysema of lung (CMS-[facility])    • Hiatus hernia syndrome    • Hypothyroidism 2/8/2016   • Mixed hyperlipidemia  "2016   • Protein-calorie malnutrition (CMS-HCC) 3/28/2018     Past Surgical History:   Procedure Laterality Date   • ABDOMINAL HYSTERECTOMY TOTAL     • TONSILLECTOMY  193   • THYROIDECTOMY           Social History   Substance Use Topics   • Smoking status: Current Every Day Smoker     Packs/day: 1.00     Years: 50.00     Types: Cigarettes   • Smokeless tobacco: Never Used   • Alcohol use 0.6 oz/week     1 Glasses of wine per week      Comment: 3 per week     Social History     Social History Narrative    Lives with daughter Lynette      Family History   Problem Relation Age of Onset   • Cancer Mother    • Cancer Father      esophageal   • Stroke Father    • Lung Disease Sister      emphysema     Family Status   Relation Status   • Mother    • Father    • Sister    • Sister    • Sister      Allergies: Patient has no known allergies.    ROS  Constitutional: Negative for fatigue/generalized weakness.   HEENT: Negative for  vision changes, hearing changes    Respiratory: Negative for shortness of breath  Cardiovascular: Negative for chest pain, palpitations  Gastrointestinal: Negative for blood in stool, constipation, diarrhea  Genitourinary: Negative for dysuria, polyuria  Musculoskeletal: Negative for myalgias and joint pain. Positive for low back pain  Skin: Negative for rash  Neurological: Negative for numbness, tingling  Psychiatric/Behavioral: Negative for depression, positive for anxiety     Objective   Blood pressure 110/60, pulse 83, temperature 36.4 °C (97.5 °F), resp. rate 14, height 1.6 m (5' 3\"), weight 39.9 kg (88 lb), SpO2 97 %. Body mass index is 15.59 kg/m².    General: Alert, oriented. In no acute distress   HEET: EOMI, PERRL, conjunctiva non-injected, sclera non-icteric.  Nares patent with no significant congestion or drainage.  Gracy pinnae, external auditory canals, TM pearly gray with normal light reflex bilaterally.Oral mucous membranes pink and " moist with no lesions.  Neck: supple with no cervical, subclavicular lymphadenopathy, JVD, palpable thyroid nodules   Lungs: clear to auscultation bilaterally with good excursion.  CV: regular rate and rhythm.  Abdomen soft, non-distended, non-tender with normal bowel sounds. No hepatosplenomegaly, no masses palpated  Skin: entry sites of lumbar back healing well without induration  Psychiatric: appropriate mood and affect       Assessment and Plan   The following treatment plan was discussed     1. Postoperative hypothyroidism  TSH within goal as of 9/2017, continue levothyroxine 112 mcg.     2. Mixed hyperlipidemia  Continue atorvastatin 10 mg daily.   - LIPID PROFILE; Future    Lab Results   Component Value Date/Time    CHOLSTRLTOT 189 03/23/2017 07:35 AM     (H) 03/23/2017 07:35 AM    HDL 61 03/23/2017 07:35 AM    TRIGLYCERIDE 98 03/23/2017 07:35 AM       3. Aortic aneurysm without rupture, unspecified portion of aorta (CMS-HCC)  Last US aorta was 2016, will check repeat to evaluate if change in size. Counseled on smoking cessation.   - US-AORTA; Future    4. Easy bruising  Will check PT/PTT.   - PT AND PTT    5. Closed compression fracture of fourth lumbar vertebra, sequela  Counseled on smoking cessation as that can help with healing, Jackelin expressed understanding. Will give order for walker that is more portable for her to be able to fold into her car.   - DME WALKER    6. Protein calorie malnutrition  Encouraged patient to eat less candy and eat more nutritious, filling foods and do her best with increasing calories.       Return in about 6 months (around 9/28/2018).    Health Maintenance      Health Maintenance Due   Topic Date Due   • IMM DTaP/Tdap/Td Vaccine (1 - Tdap) 12/25/1951   • IMM ZOSTER VACCINE  12/25/1992   • Annual Wellness Visit  12/08/2017       Jayce Barron MD  Internal Medicine  [facility]

## 2018-03-28 NOTE — ASSESSMENT & PLAN NOTE
Following with [facility], where her tramadol is prescribed. Underwent L4 Kyphoplasty on 3/21/2018. Still having low back pain and wants her site examined.

## 2018-03-28 NOTE — ASSESSMENT & PLAN NOTE
Her daughter has been trying to give her at least one Boost day, patient cannot take more. She is reporting poor appetite. Lynette, her daughter who is present today, is trying to ensure she has protein enriched meals and lessen her intake of candy.

## 2018-04-06 ENCOUNTER — HOSPITAL ENCOUNTER (OUTPATIENT)
Dept: LAB | Facility: MEDICAL CENTER | Age: 83
End: 2018-04-06
Attending: NURSE PRACTITIONER
Payer: MEDICARE

## 2018-04-06 ENCOUNTER — HOSPITAL ENCOUNTER (OUTPATIENT)
Dept: LAB | Facility: MEDICAL CENTER | Age: 83
End: 2018-04-06
Attending: INTERNAL MEDICINE
Payer: MEDICARE

## 2018-04-06 ENCOUNTER — HOSPITAL ENCOUNTER (OUTPATIENT)
Dept: RADIOLOGY | Facility: MEDICAL CENTER | Age: 83
End: 2018-04-06
Attending: INTERNAL MEDICINE
Payer: MEDICARE

## 2018-04-06 DIAGNOSIS — E78.2 MIXED HYPERLIPIDEMIA: ICD-10-CM

## 2018-04-06 DIAGNOSIS — I71.9 AORTIC ANEURYSM WITHOUT RUPTURE, UNSPECIFIED PORTION OF AORTA (HCC): ICD-10-CM

## 2018-04-06 LAB
25(OH)D3 SERPL-MCNC: 13 NG/ML (ref 30–100)
ALBUMIN SERPL BCP-MCNC: 3.9 G/DL (ref 3.2–4.9)
ALBUMIN/GLOB SERPL: 1.3 G/DL
ALP SERPL-CCNC: 68 U/L (ref 30–99)
ALT SERPL-CCNC: 17 U/L (ref 2–50)
ANION GAP SERPL CALC-SCNC: 8 MMOL/L (ref 0–11.9)
APTT PPP: 24.7 SEC (ref 24.7–36)
AST SERPL-CCNC: 22 U/L (ref 12–45)
BASOPHILS # BLD AUTO: 1.4 % (ref 0–1.8)
BASOPHILS # BLD: 0.08 K/UL (ref 0–0.12)
BILIRUB SERPL-MCNC: 0.6 MG/DL (ref 0.1–1.5)
BUN SERPL-MCNC: 21 MG/DL (ref 8–22)
CALCIUM SERPL-MCNC: 9.8 MG/DL (ref 8.5–10.5)
CHLORIDE SERPL-SCNC: 106 MMOL/L (ref 96–112)
CHOLEST SERPL-MCNC: 183 MG/DL (ref 100–199)
CO2 SERPL-SCNC: 26 MMOL/L (ref 20–33)
CREAT SERPL-MCNC: 0.69 MG/DL (ref 0.5–1.4)
EOSINOPHIL # BLD AUTO: 0 K/UL (ref 0–0.51)
EOSINOPHIL NFR BLD: 0 % (ref 0–6.9)
ERYTHROCYTE [DISTWIDTH] IN BLOOD BY AUTOMATED COUNT: 54.8 FL (ref 35.9–50)
GLOBULIN SER CALC-MCNC: 3 G/DL (ref 1.9–3.5)
GLUCOSE SERPL-MCNC: 83 MG/DL (ref 65–99)
HCT VFR BLD AUTO: 43.5 % (ref 37–47)
HDLC SERPL-MCNC: 66 MG/DL
HGB BLD-MCNC: 14.3 G/DL (ref 12–16)
IMM GRANULOCYTES # BLD AUTO: 0.01 K/UL (ref 0–0.11)
IMM GRANULOCYTES NFR BLD AUTO: 0.2 % (ref 0–0.9)
INR PPP: 1.03 (ref 0.87–1.13)
LDLC SERPL CALC-MCNC: 102 MG/DL
LYMPHOCYTES # BLD AUTO: 1.26 K/UL (ref 1–4.8)
LYMPHOCYTES NFR BLD: 21.6 % (ref 22–41)
MCH RBC QN AUTO: 31.2 PG (ref 27–33)
MCHC RBC AUTO-ENTMCNC: 32.9 G/DL (ref 33.6–35)
MCV RBC AUTO: 94.8 FL (ref 81.4–97.8)
MONOCYTES # BLD AUTO: 0.45 K/UL (ref 0–0.85)
MONOCYTES NFR BLD AUTO: 7.7 % (ref 0–13.4)
NEUTROPHILS # BLD AUTO: 4.04 K/UL (ref 2–7.15)
NEUTROPHILS NFR BLD: 69.1 % (ref 44–72)
NRBC # BLD AUTO: 0 K/UL
NRBC BLD-RTO: 0 /100 WBC
PLATELET # BLD AUTO: 171 K/UL (ref 164–446)
PMV BLD AUTO: 11.4 FL (ref 9–12.9)
POTASSIUM SERPL-SCNC: 4.2 MMOL/L (ref 3.6–5.5)
PROT SERPL-MCNC: 6.9 G/DL (ref 6–8.2)
PROTHROMBIN TIME: 13.2 SEC (ref 12–14.6)
PTH-INTACT SERPL-MCNC: 38 PG/ML (ref 14–72)
RBC # BLD AUTO: 4.59 M/UL (ref 4.2–5.4)
SODIUM SERPL-SCNC: 140 MMOL/L (ref 135–145)
TRIGL SERPL-MCNC: 77 MG/DL (ref 0–149)
WBC # BLD AUTO: 5.8 K/UL (ref 4.8–10.8)

## 2018-04-06 PROCEDURE — 36415 COLL VENOUS BLD VENIPUNCTURE: CPT

## 2018-04-06 PROCEDURE — 85610 PROTHROMBIN TIME: CPT

## 2018-04-06 PROCEDURE — 76775 US EXAM ABDO BACK WALL LIM: CPT

## 2018-04-06 PROCEDURE — 80053 COMPREHEN METABOLIC PANEL: CPT

## 2018-04-06 PROCEDURE — 85730 THROMBOPLASTIN TIME PARTIAL: CPT

## 2018-04-06 PROCEDURE — 83970 ASSAY OF PARATHORMONE: CPT

## 2018-04-06 PROCEDURE — 82306 VITAMIN D 25 HYDROXY: CPT

## 2018-04-06 PROCEDURE — 80061 LIPID PANEL: CPT

## 2018-04-06 PROCEDURE — 85025 COMPLETE CBC W/AUTO DIFF WBC: CPT

## 2018-04-13 ENCOUNTER — TELEPHONE (OUTPATIENT)
Dept: MEDICAL GROUP | Facility: PHYSICIAN GROUP | Age: 83
End: 2018-04-13

## 2018-04-13 NOTE — TELEPHONE ENCOUNTER
ANNUAL WELLNESS VISIT PRE-VISIT PLANNING WITH OUTREACH    1.  Immunizations were updated in Epic using WebIZ?:Epic matches WebIZ       •  WebIZ Recommendations: TDAP       •  Is patient due for Tdap? YES. Patient was notified of copay/out of pocket cost.       •  Is patient due for Shingles?NO    2.  MDX printed for Provider? YES

## 2018-04-18 ENCOUNTER — HOSPITAL ENCOUNTER (OUTPATIENT)
Dept: RADIOLOGY | Facility: MEDICAL CENTER | Age: 83
End: 2018-04-18
Attending: NURSE PRACTITIONER

## 2018-04-18 DIAGNOSIS — S32.040G CLOSED COMPRESSION FRACTURE OF L4 LUMBAR VERTEBRA, WITH DELAYED HEALING, SUBSEQUENT ENCOUNTER: ICD-10-CM

## 2018-04-18 NOTE — CONSULTS
Interventional Radiology Follow Up     Re: Mary Murrieta     MRN: [id number]   : 1932    Mary Murrieta was seen today in follow up after vertebral augmentation of L4 performed by Momo Dyer MD at [facility] on 2018.  She was referred to our service by Isi Akins PA-C and is also under the care of Jayce Barron MD.    History of Present Illness:   presented with acute severe low back pain radiating down the left leg after moving furniture on 2018. She presented to urgent care and then the ED for unresolving pain and was diagnosed with an acute L4 compression fracture. She has a history of osteopenia seen on XRays. The patient was referred to interventional radiology for evaluation and management of a painful L4 compression fracture. Dr. Dyer performed vertebral augmentation on . The patient's clinical course was unremarkable. She was discharged from [facility] on the same day. She contacted our clinic with concerns about a lump at the site but it was not red or painful. Another provider evaluated her on  and determined there was not site abnormality. She is seen today for pain assessment in follow up after the procedure. Today, the patient describes a little relief but her pain is still present. It has not changed in location since her procedure. At rest it is 2/10. Worst pain in the past 24 hours was 6/10. It is at its worst with prolonged sitting or standing. She takes a morning tramadol most days. She is able to do some limited laundry now, which she had been unable to do before the procedure. Her left leg pain has improved but not resolved. She has not yet followed up with Keanu Almaguer after the procedure. She is accompanied by her daughter today, who expresses concern about the patient's ongoing weight loss. She has lost 10 pounds since January and weighed 85 pounds today. At the time of her consultation on  her weight was 87 pounds. She describes  anorexia and early satiety. She has not seen her PCP regarding this problem. She tries to drink Boost supplements 2 daily but it is difficult due to her poor appetite. She receives it in the mail and has trouble getting the shipment into the house due to her back pain. She continues to smoke.      Past Medical History:   Diagnosis Date   • Aortic aneurysm without rupture (CMS-HCC)    • Cataract     +surgery bilat   • Compression fracture of fourth lumbar vertebra (CMS-HCC) 3/28/2018   • Dental disorder 03/20/2018    upper and lower   • Emphysema of lung (CMS-HCC)    • Hiatus hernia syndrome    • Hypothyroidism 2/8/2016   • Mixed hyperlipidemia 12/7/2016   • Protein-calorie malnutrition (CMS-HCC) 3/28/2018     Past Surgical History:   Procedure Laterality Date   • ABDOMINAL HYSTERECTOMY TOTAL  1986   • TONSILLECTOMY  1938   • THYROIDECTOMY      1976     Social History     Social History   • Marital status:      Spouse name: N/A   • Number of children: N/A   • Years of education: N/A     Occupational History   • Not on file.     Social History Main Topics   • Smoking status: Current Every Day Smoker     Packs/day: 1.00     Years: 50.00     Types: Cigarettes   • Smokeless tobacco: Never Used   • Alcohol use 0.6 oz/week     1 Glasses of wine per week      Comment: 3 per week   • Drug use: No   • Sexual activity: No     Other Topics Concern   • Not on file     Social History Narrative    Lives with daughter Lynette      Family History   Problem Relation Age of Onset   • Cancer Mother    • Cancer Father      esophageal   • Stroke Father    • Lung Disease Sister      emphysema       Review of Systems:  Constitutional: positive for anorexia and weight loss, negative for fevers  Ears, nose, mouth, throat, and face: positive for hearing loss  Respiratory: positive for emphysema  Gastrointestinal: negative for incontinence  Genitourinary:negative for urinary incontinence  Musculoskeletal:positive for back pain, bone pain  and muscle weakness  Neurological: positive for left leg pain     A comprehensive 14-point review of systems was negative except as described above.     Labs:      Ref. Range 3/20/2018 14:55 4/6/2018 10:21 4/6/2018 10:22   WBC Latest Ref Range: 4.8 - 10.8 K/uL 7.1  5.8   RBC Latest Ref Range: 4.20 - 5.40 M/uL 4.79  4.59   Hemoglobin Latest Ref Range: 12.0 - 16.0 g/dL 15.2  14.3   Hematocrit Latest Ref Range: 37.0 - 47.0 % 44.9  43.5   MCV Latest Ref Range: 81.4 - 97.8 fL 93.7  94.8   MCH Latest Ref Range: 27.0 - 33.0 pg 31.7  31.2   MCHC Latest Ref Range: 33.6 - 35.0 g/dL 33.9  32.9 (L)   RDW Latest Ref Range: 35.9 - 50.0 fL 53.3 (H)  54.8 (H)   Platelet Count Latest Ref Range: 164 - 446 K/uL 145 (L)  171   MPV Latest Ref Range: 9.0 - 12.9 fL 11.0  11.4   Neutrophils-Polys Latest Ref Range: 44.00 - 72.00 %   69.10   Neutrophils (Absolute) Latest Ref Range: 2.00 - 7.15 K/uL   4.04   Lymphocytes Latest Ref Range: 22.00 - 41.00 %   21.60 (L)   Lymphs (Absolute) Latest Ref Range: 1.00 - 4.80 K/uL   1.26   Monocytes Latest Ref Range: 0.00 - 13.40 %   7.70   Monos (Absolute) Latest Ref Range: 0.00 - 0.85 K/uL   0.45   Eosinophils Latest Ref Range: 0.00 - 6.90 %   0.00   Eos (Absolute) Latest Ref Range: 0.00 - 0.51 K/uL   0.00   Basophils Latest Ref Range: 0.00 - 1.80 %   1.40   Baso (Absolute) Latest Ref Range: 0.00 - 0.12 K/uL   0.08   Immature Granulocytes Latest Ref Range: 0.00 - 0.90 %   0.20   Immature Granulocytes (abs) Latest Ref Range: 0.00 - 0.11 K/uL   0.01   Nucleated RBC Latest Units: /100 WBC   0.00   NRBC (Absolute) Latest Units: K/uL   0.00   Sodium Latest Ref Range: 135 - 145 mmol/L 138  140   Potassium Latest Ref Range: 3.6 - 5.5 mmol/L 3.8  4.2   Chloride Latest Ref Range: 96 - 112 mmol/L 103  106   Co2 Latest Ref Range: 20 - 33 mmol/L 27  26   Anion Gap Latest Ref Range: 0.0 - 11.9  8.0  8.0   Glucose Latest Ref Range: 65 - 99 mg/dL 100 (H)  83   Bun Latest Ref Range: 8 - 22 mg/dL 22  21   Creatinine  Latest Ref Range: 0.50 - 1.40 mg/dL 0.90  0.69   GFR If  Latest Ref Range: >60 mL/min/1.73 m 2 >60  >60   GFR If Non  Latest Ref Range: >60 mL/min/1.73 m 2 59 (A)  >60   Calcium Latest Ref Range: 8.5 - 10.5 mg/dL 9.5  9.8   AST(SGOT) Latest Ref Range: 12 - 45 U/L   22   ALT(SGPT) Latest Ref Range: 2 - 50 U/L   17   Alkaline Phosphatase Latest Ref Range: 30 - 99 U/L   68   Total Bilirubin Latest Ref Range: 0.1 - 1.5 mg/dL   0.6   Albumin Latest Ref Range: 3.2 - 4.9 g/dL   3.9   Total Protein Latest Ref Range: 6.0 - 8.2 g/dL   6.9   Globulin Latest Ref Range: 1.9 - 3.5 g/dL   3.0   A-G Ratio Latest Units: g/dL   1.3   Cholesterol,Tot Latest Ref Range: 100 - 199 mg/dL  183    Triglycerides Latest Ref Range: 0 - 149 mg/dL  77    HDL Latest Ref Range: >=40 mg/dL  66    LDL Latest Ref Range: <100 mg/dL  102 (H)    PT Latest Ref Range: 12.0 - 14.6 sec 13.4 13.2    INR Latest Ref Range: 0.87 - 1.13  1.05 1.03    APTT Latest Ref Range: 24.7 - 36.0 sec  24.7        Radiology:   Interventional radiology procedure L4 vertebral augmentation March 21, 2018 at [facility]:  Successful percutaneous L4 kyphoplasty with biplane fluoroscopic guidance for an osteoporotic insufficiency compression fracture.    MRI lumbar spine on March 6, 2018 at [facility]:  1.  Mild acute butterfly type compression fracture of the L4 vertebral body amenable to vertebral augmentation.  2.  Sigmoid scoliosis of the lumbar spine with greatest curvature to the left in the upper lumbar region. Further there is marked lateral subluxation at the L2-3 level.  3.  Mild central canal stenosis at the L2-3 level and borderline central canal stenosis at the L3-4 level secondary to facet arthropathy.  4.  Minimal to mild multilevel lumbar spondylotic change.  5.  Severe right-sided neural foraminal stenosis with disc bulging into the inferior aspect of the right-sided neural foramina at the L2-3 level.  6.  Mild to moderate right-sided neural  foraminal stenosis at the L1-2 level.       CT hip March 6, 2018 at [facility]:  Orthopedic fixation proximal left femur appears stable. Deformity left femoral head consistent with old fracture. No acute fracture identified.  No hip joint dislocation.  Increasing compression of the L4 vertebral body and retropulsed fragments compromising the spinal canal by 30% since 1/27/2018.  Infrarenal normal aortic aneurysm incompletely imaged.     CT lumbar spine January 27, 2018 at [facility]:  1.  Mild superior endplate compression fracture at the L4 level is identified which could be acute or subacute insufficiency fracture. There is minimal indentation of the spinal canal with no significant spinal stenosis at this level.  2.  No other fractures identified.  3.  There is degenerative disc disease facet arthropathy and ligamentum flavum hypertrophy. There is S-shaped scoliosis as described above.  4.  Mild spinal stenosis at the L4-L5 level due to degenerative disc disease facet arthropathy and ligamentum flavum hypertrophy.     CT renal colic January 27, 2018 at [facility]:  1.  NO URINARY TRACT CALCULI OR OBSTRUCTION.  2.  LARGE INFRARENAL ABDOMINAL AORTIC ANEURYSM MEASURES 4.8 X 4.05 CM. THERE IS EXTENSIVE CALCIFIC ATHEROSCLEROSIS.     Xray lumbar spine 2V January 23, 2018 at [facility]:  1.  There is an age-indeterminate mild L4 superior endplate compression fracture.  2.  There is diffuse osteopenia.  3.  There is multilevel degenerative change with scoliosis.  4.  Infrarenal abdominal aortic aneurysm is stable in size compared with previous ultrasound.    Current Outpatient Prescriptions   Medication Sig Dispense Refill   • atorvastatin (LIPITOR) 10 MG Tab Take 10 mg by mouth every evening.     • gabapentin (NEURONTIN) 100 MG Cap Take 100 mg by mouth 3 times a day.     • tramadol (ULTRAM) 50 MG Tab Take 50 mg by mouth every four hours as needed.     • levothyroxine (SYNTHROID) 112 MCG Tab TAKE 1 TAB BY MOUTH EVERY MORNING ON AN EMPTY  STOMACH. 90 Tab 2   • aspirin EC (ECOTRIN) 81 MG Tablet Delayed Response Take 81 mg by mouth every day.     • omeprazole (PRILOSEC) 20 MG delayed-release capsule Take 20 mg by mouth every day.       No current facility-administered medications for this encounter.        No Known Allergies    Physical Exam:  General Appearance: alert, cooperative, thin appearing  Skin: negatives: color normal, temperature normal  Lungs: negative findings: normal respiratory rate and rhythm  Neurologic: oriented x 4  There is mild focal pain of the vertebral body at L4.  Skin: well healed kyphoplasty incisions without erythema or swelling  Karnofsky score 50     Impression:   1. Acute L4 vertebral compression fracture, status post vertebral augmentation.  2. Low back pain.   3. Tobacco dependence.   4. Osteopenia lumbar spine determined by xray.  5. Hypothyroid.   6. Unintentional weight loss.     Plan:   We discussed the method of the procedure at length and reviewed imaging studies. All questions were answered. We discussed the possibility of future compression fractures and she should follow up with Keanu Almaguer or her PCP regarding the osteopenia seen on xray. We discussed ergonomics and lifting and our recommendation is that she perform only light lifting. In regards to her complaints of improved but residual back pain, it is likely related to the scoliosis, facet arthropathy, and marked subluxation of L2-L3 seen on imaging and she should follow up with [facility] for management plan. In regards to her ongoing anorexia and weight loss despite using supplements, we strongly recommend that she follow up with her PCP for evaluation and work up for this problem.     ROBEL Main with Momo Dyer MD  Interventional Radiology   [facility]   [id number]  [place] [address]  [phone]

## 2018-04-23 ENCOUNTER — APPOINTMENT (OUTPATIENT)
Dept: RADIOLOGY | Facility: MEDICAL CENTER | Age: 83
End: 2018-04-23
Attending: NURSE PRACTITIONER
Payer: MEDICARE

## 2018-04-24 ENCOUNTER — HOSPITAL ENCOUNTER (OUTPATIENT)
Dept: RADIOLOGY | Facility: MEDICAL CENTER | Age: 83
End: 2018-04-24
Attending: NURSE PRACTITIONER
Payer: MEDICARE

## 2018-04-24 DIAGNOSIS — M80.08XA CRUSH FRACTURE OF VERTEBRA DUE TO OSTEOPOROSIS, INITIAL ENCOUNTER (HCC): ICD-10-CM

## 2018-04-24 PROCEDURE — 77080 DXA BONE DENSITY AXIAL: CPT

## 2018-05-11 ENCOUNTER — OFFICE VISIT (OUTPATIENT)
Dept: MEDICAL GROUP | Facility: PHYSICIAN GROUP | Age: 83
End: 2018-05-11
Payer: MEDICARE

## 2018-05-11 VITALS
HEIGHT: 63 IN | RESPIRATION RATE: 16 BRPM | OXYGEN SATURATION: 93 % | SYSTOLIC BLOOD PRESSURE: 106 MMHG | DIASTOLIC BLOOD PRESSURE: 76 MMHG | BODY MASS INDEX: 15.41 KG/M2 | WEIGHT: 87 LBS | TEMPERATURE: 98.8 F | HEART RATE: 94 BPM

## 2018-05-11 DIAGNOSIS — I71.9 AORTIC ANEURYSM WITHOUT RUPTURE, UNSPECIFIED PORTION OF AORTA (HCC): ICD-10-CM

## 2018-05-11 DIAGNOSIS — H91.90 HEARING DIFFICULTY, UNSPECIFIED LATERALITY: ICD-10-CM

## 2018-05-11 DIAGNOSIS — E78.2 MIXED HYPERLIPIDEMIA: ICD-10-CM

## 2018-05-11 DIAGNOSIS — F17.200 CURRENT SMOKER: ICD-10-CM

## 2018-05-11 DIAGNOSIS — K21.9 GASTROESOPHAGEAL REFLUX DISEASE WITHOUT ESOPHAGITIS: ICD-10-CM

## 2018-05-11 DIAGNOSIS — M54.5 ACUTE LOW BACK PAIN, UNSPECIFIED BACK PAIN LATERALITY, WITH SCIATICA PRESENCE UNSPECIFIED: ICD-10-CM

## 2018-05-11 DIAGNOSIS — E89.0 POSTOPERATIVE HYPOTHYROIDISM: ICD-10-CM

## 2018-05-11 DIAGNOSIS — S32.040S CLOSED COMPRESSION FRACTURE OF FOURTH LUMBAR VERTEBRA, SEQUELA: ICD-10-CM

## 2018-05-11 PROCEDURE — G0439 PPPS, SUBSEQ VISIT: HCPCS | Performed by: INTERNAL MEDICINE

## 2018-05-11 RX ORDER — ERGOCALCIFEROL 1.25 MG/1
CAPSULE ORAL
Refills: 0 | COMMUNITY
Start: 2018-04-09 | End: 2018-05-10

## 2018-05-11 RX ORDER — BUDESONIDE AND FORMOTEROL FUMARATE DIHYDRATE 160; 4.5 UG/1; UG/1
2 AEROSOL RESPIRATORY (INHALATION) 2 TIMES DAILY
Refills: 1 | COMMUNITY
Start: 2018-05-07 | End: 2018-09-04 | Stop reason: SDUPTHER

## 2018-05-11 ASSESSMENT — PATIENT HEALTH QUESTIONNAIRE - PHQ9: CLINICAL INTERPRETATION OF PHQ2 SCORE: 0

## 2018-05-11 ASSESSMENT — ACTIVITIES OF DAILY LIVING (ADL): BATHING_REQUIRES_ASSISTANCE: 0

## 2018-05-11 NOTE — PROGRESS NOTES
Chief Complaint   Patient presents with   • Annual Wellness Visit         HPI:  Mary is a 85 y.o. here for Medicare Annual Wellness Visit        Patient Active Problem List    Diagnosis Date Noted   • Gastroesophageal reflux disease without esophagitis 05/11/2018   • Hearing difficulty 05/11/2018   • Aortic aneurysm without rupture (HCC) 03/28/2018   • Compression fracture of fourth lumbar vertebra (HCC) 03/28/2018   • Easy bruising 03/28/2018   • Protein-calorie malnutrition (HCC) 03/28/2018   • Acute low back pain 02/01/2018   • Current smoker 12/07/2016   • Mixed hyperlipidemia 12/07/2016   • Hypothyroidism 02/08/2016       Current Outpatient Prescriptions   Medication Sig Dispense Refill   • Nutritional Supplements (VITAMIN D BOOSTER PO) Take  by mouth.     • atorvastatin (LIPITOR) 10 MG Tab Take 10 mg by mouth every evening.     • gabapentin (NEURONTIN) 100 MG Cap Take 100 mg by mouth 3 times a day.     • tramadol (ULTRAM) 50 MG Tab Take 50 mg by mouth every four hours as needed.     • levothyroxine (SYNTHROID) 112 MCG Tab TAKE 1 TAB BY MOUTH EVERY MORNING ON AN EMPTY STOMACH. 90 Tab 2   • aspirin EC (ECOTRIN) 81 MG Tablet Delayed Response Take 81 mg by mouth every day.     • omeprazole (PRILOSEC) 20 MG delayed-release capsule Take 20 mg by mouth every day.       No current facility-administered medications for this visit.         Patient is taking medications as noted in medication list.  Current supplements as per medication list.     Allergies: Patient has no known allergies.    Current social contact/activities: Visits with children   Patient's perception of their health: poor    Is patient current with immunizations? No, due for TDAP. Patient is interested in receiving NONE today.    She  reports that she has been smoking Cigarettes.  She has a 50.00 pack-year smoking history. She has never used smokeless tobacco. She reports that she drinks about 0.6 oz of alcohol per week . She reports that she does not  use drugs.  Ready to quit: Not Answered  Counseling given: Not Answered        DPA/Advanced directive: Patient does not have an Advanced Directive.  A packet and workshop information was given on Advanced Directives.    ROS:    Gait: Uses a cane   Ostomy: No   Other tubes: No   Amputations: No   Chronic oxygen use: No   Last eye exam: 1/2018   Wears hearing aids: No   : Denies any urinary leakage during the last 6 months      Screening:    Depression Screening  Little interest or pleasure in doing things?  0 - not at all  Feeling down, depressed, or hopeless? 0 - not at all  Patient Health Questionnaire Score: 0    If depressive symptoms identified deferred to follow up visit unless specifically addressed in assessment and plan.    Interpretation of PHQ-9 Total Score   Score Severity   1-4 No Depression   5-9 Mild Depression   10-14 Moderate Depression   15-19 Moderately Severe Depression   20-27 Severe Depression    Screening for Cognitive Impairment  Three Minute Recall (apple, watch, leticia)  3/3    Draw clock face with all 12 numbers set to the hand to show 10 minutes past 11 o'clock  1    If cognitive concerns identified, deferred for follow up unless specifically addressed in assessment and plan.    Fall Risk Assessment  Has the patient had two or more falls in the last year or any fall with injury in the last year?  No  If fall risk identified, deferred for follow up unless specifically addressed in assessment and plan.    Safety Assessment  Throw rugs on floor.  Yes  Handrails on all stairs.  No  Good lighting in all hallways.  Yes  Difficulty hearing.  Yes  Patient counseled about all safety risks that were identified.    Functional Assessment ADLs  Are there any barriers preventing you from cooking for yourself or meeting nutritional needs?  No.    Are there any barriers preventing you from driving safely or obtaining transportation?  No.    Are there any barriers preventing you from using a telephone or  "calling for help?  No.    Are there any barriers preventing you from shopping?  Yes. Pt states :Severe  Back Pain  Are there any barriers preventing you from taking care of your own finances?  No.    Are there any barriers preventing you from managing your medications?  No.    Are there any barriers preventing you from showering, bathing or dressing yourself?  No.    Are you currently engaging any exercise or physical activity?  No.       Health Maintenance Summary                IMM DTaP/Tdap/Td Vaccine Overdue 12/25/1951     Annual Wellness Visit Overdue 12/8/2017      Done 12/7/2016 Visit Dx: Medicare annual wellness visit, subsequent          Patient Care Team:  Jayce Barron M.D. as PCP - General (Internal Medicine)  Isi Akins P.A.-C. as Consulting Physician (Neurosurgery)    Social History   Substance Use Topics   • Smoking status: Current Every Day Smoker     Packs/day: 1.00     Years: 50.00     Types: Cigarettes   • Smokeless tobacco: Never Used   • Alcohol use 0.6 oz/week     1 Glasses of wine per week      Comment: 3 per week     Family History   Problem Relation Age of Onset   • Cancer Mother    • Cancer Father      esophageal   • Stroke Father    • Lung Disease Sister      emphysema     She  has a past medical history of Aortic aneurysm without rupture (HCC); Cataract; Compression fracture of fourth lumbar vertebra (HCC) (3/28/2018); Dental disorder (03/20/2018); Emphysema of lung (HCC); Gastroesophageal reflux disease without esophagitis (5/11/2018); Hiatus hernia syndrome; Hypothyroidism (2/8/2016); Mixed hyperlipidemia (12/7/2016); and Protein-calorie malnutrition (HCC) (3/28/2018).   Past Surgical History:   Procedure Laterality Date   • ABDOMINAL HYSTERECTOMY TOTAL  1986   • TONSILLECTOMY  1938   • THYROIDECTOMY      1976         Exam:   Blood pressure 106/76, pulse 94, temperature 37.1 °C (98.8 °F), resp. rate 16, height 1.6 m (5' 3\"), weight 39.5 kg (87 lb), SpO2 93 %. Body mass index is 15.41 " kg/m².    Hearing poor.    Dentition fair  Alert, oriented in no acute distress.  Eye contact is good, speech goal directed, affect calm      Assessment and Plan. The following treatment and monitoring plan is recommended:    Aortic aneurysm without rupture (CMS-HCC) (HCC)  US aorta done 4/2018. She says she isn't sure she if she is on atorvastatin as prescribed. Advised her to check if she's on lipitor or not, she forgot to bring her medicines today. Strongly emphasized smoking cessation again.     Current smoker  Continues to smoke, doesn't want to quit.     Hypothyroidism  TSH at goal as of 9/2017 on levothyroxine 112 mcg, stable at this dose.     Mixed hyperlipidemia  She isn't sure if she's taking the atorvastatin.     Acute low back pain  Taking gabapentin. Following with [facility].     Compression fracture of fourth lumbar vertebra (CMS-HCC) (HCC)  Following with [facility]. She had a L4 kyphoplasty on 3/21/2018.     Gastroesophageal reflux disease without esophagitis  Controlled on omeprazole.     Hearing difficulty  She is having difficulty with decreased hearing and has never been evaluated for hearing aids.     Services suggested: No services needed at this time  Health Care Screening: Age-appropriate preventive services recommended by USPTF and ACIP covered by Medicare were discussed today. Services ordered if indicated and agreed upon by the patient.  Referrals offered: PT/OT/Nutrition counseling/Behavioral Health/Smoking cessation as per orders if indicated.    Discussion today about general wellness and lifestyle habits:    · Prevent falls and reduce trip hazards; Cautioned about securing or removing rugs.  · Have a working fire alarm and carbon monoxide detector;   · Engage in regular physical activity and social activities     Follow-up: Return in about 1 week (around 5/18/2018).      Jayce Barron M.D.

## 2018-06-05 ENCOUNTER — HOSPITAL ENCOUNTER (OUTPATIENT)
Dept: RADIOLOGY | Facility: MEDICAL CENTER | Age: 83
End: 2018-06-05
Attending: NURSE PRACTITIONER
Payer: MEDICARE

## 2018-06-05 ENCOUNTER — HOSPITAL ENCOUNTER (OUTPATIENT)
Dept: LAB | Facility: MEDICAL CENTER | Age: 83
End: 2018-06-05
Attending: NURSE PRACTITIONER
Payer: MEDICARE

## 2018-06-05 DIAGNOSIS — M25.562 LEFT KNEE PAIN, UNSPECIFIED CHRONICITY: ICD-10-CM

## 2018-06-05 LAB — 25(OH)D3 SERPL-MCNC: 94 NG/ML (ref 30–100)

## 2018-06-05 PROCEDURE — 82306 VITAMIN D 25 HYDROXY: CPT

## 2018-06-05 PROCEDURE — 36415 COLL VENOUS BLD VENIPUNCTURE: CPT

## 2018-06-05 PROCEDURE — 73564 X-RAY EXAM KNEE 4 OR MORE: CPT | Mod: LT

## 2018-08-13 DIAGNOSIS — E78.2 MIXED HYPERLIPIDEMIA: ICD-10-CM

## 2018-08-14 RX ORDER — ATORVASTATIN CALCIUM 10 MG/1
TABLET, FILM COATED ORAL
Qty: 90 TAB | Refills: 1 | Status: SHIPPED | OUTPATIENT
Start: 2018-08-14 | End: 2019-02-27 | Stop reason: SDUPTHER

## 2018-08-14 NOTE — TELEPHONE ENCOUNTER
Pt has had OV within the 12 month protocol and lipid panel is current. 6 month supply sent to pharmacy.   Lab Results   Component Value Date/Time    CHOLSTRLTOT 183 04/06/2018 10:21 AM     (H) 04/06/2018 10:21 AM    HDL 66 04/06/2018 10:21 AM    TRIGLYCERIDE 77 04/06/2018 10:21 AM       Lab Results   Component Value Date/Time    SODIUM 140 04/06/2018 10:22 AM    POTASSIUM 4.2 04/06/2018 10:22 AM    CHLORIDE 106 04/06/2018 10:22 AM    CO2 26 04/06/2018 10:22 AM    GLUCOSE 83 04/06/2018 10:22 AM    BUN 21 04/06/2018 10:22 AM    CREATININE 0.69 04/06/2018 10:22 AM     Lab Results   Component Value Date/Time    ALKPHOSPHAT 68 04/06/2018 10:22 AM    ASTSGOT 22 04/06/2018 10:22 AM    ALTSGPT 17 04/06/2018 10:22 AM    TBILIRUBIN 0.6 04/06/2018 10:22 AM

## 2018-08-31 DIAGNOSIS — F17.200 CURRENT SMOKER: ICD-10-CM

## 2018-08-31 RX ORDER — BUDESONIDE AND FORMOTEROL FUMARATE DIHYDRATE 160; 4.5 UG/1; UG/1
2 AEROSOL RESPIRATORY (INHALATION) 2 TIMES DAILY
Qty: 3 INHALER | Refills: 0 | Status: CANCELLED | OUTPATIENT
Start: 2018-08-31

## 2018-09-01 NOTE — TELEPHONE ENCOUNTER
Dr Aguilar Izaguirre was d/c'd 3/18 by an RN, not showing any notes related to it and you have never prescribed. Please refill as you see fit.

## 2018-09-04 ENCOUNTER — TELEPHONE (OUTPATIENT)
Dept: MEDICAL GROUP | Facility: PHYSICIAN GROUP | Age: 83
End: 2018-09-04

## 2018-09-04 RX ORDER — BUDESONIDE AND FORMOTEROL FUMARATE DIHYDRATE 160; 4.5 UG/1; UG/1
2 AEROSOL RESPIRATORY (INHALATION) 2 TIMES DAILY
Qty: 3 INHALER | Refills: 1 | Status: SHIPPED | OUTPATIENT
Start: 2018-09-04 | End: 2019-04-23 | Stop reason: SDUPTHER

## 2018-09-04 NOTE — TELEPHONE ENCOUNTER
Was the patient seen in the last year in this department? Yes    Does patient have an active prescription for medications requested? No     Received Request Via: Pharmacy      Pt met protocol?: Yes    OV 5/18    *PHARMACY REQUESTING SYMBICORT

## 2019-03-03 ENCOUNTER — APPOINTMENT (OUTPATIENT)
Dept: RADIOLOGY | Facility: MEDICAL CENTER | Age: 84
End: 2019-03-03
Attending: HOSPITALIST
Payer: MEDICARE

## 2019-03-03 ENCOUNTER — APPOINTMENT (OUTPATIENT)
Dept: RADIOLOGY | Facility: MEDICAL CENTER | Age: 84
End: 2019-03-03
Attending: EMERGENCY MEDICINE
Payer: MEDICARE

## 2019-03-03 ENCOUNTER — APPOINTMENT (OUTPATIENT)
Dept: CARDIOLOGY | Facility: MEDICAL CENTER | Age: 84
End: 2019-03-03
Attending: HOSPITALIST
Payer: MEDICARE

## 2019-03-03 ENCOUNTER — HOSPITAL ENCOUNTER (OUTPATIENT)
Facility: MEDICAL CENTER | Age: 84
End: 2019-03-04
Attending: EMERGENCY MEDICINE | Admitting: HOSPITALIST
Payer: MEDICARE

## 2019-03-03 DIAGNOSIS — R27.0 ATAXIA: ICD-10-CM

## 2019-03-03 DIAGNOSIS — E78.2 MIXED HYPERLIPIDEMIA: ICD-10-CM

## 2019-03-03 DIAGNOSIS — R53.1 WEAKNESS: ICD-10-CM

## 2019-03-03 PROBLEM — F10.10 ETOH ABUSE: Status: ACTIVE | Noted: 2019-03-03

## 2019-03-03 PROBLEM — J43.8 OTHER EMPHYSEMA (HCC): Status: ACTIVE | Noted: 2019-03-03

## 2019-03-03 PROBLEM — N17.9 ACUTE RENAL FAILURE SUPERIMPOSED ON STAGE 2 CHRONIC KIDNEY DISEASE (HCC): Status: ACTIVE | Noted: 2019-03-03

## 2019-03-03 PROBLEM — N18.2 ACUTE RENAL FAILURE SUPERIMPOSED ON STAGE 2 CHRONIC KIDNEY DISEASE (HCC): Status: ACTIVE | Noted: 2019-03-03

## 2019-03-03 LAB
ALBUMIN SERPL BCP-MCNC: 4.1 G/DL (ref 3.2–4.9)
ALBUMIN/GLOB SERPL: 1.5 G/DL
ALP SERPL-CCNC: 70 U/L (ref 30–99)
ALT SERPL-CCNC: 8 U/L (ref 2–50)
ANION GAP SERPL CALC-SCNC: 7 MMOL/L (ref 0–11.9)
APPEARANCE UR: ABNORMAL
AST SERPL-CCNC: 16 U/L (ref 12–45)
BACTERIA #/AREA URNS HPF: ABNORMAL /HPF
BASOPHILS # BLD AUTO: 0.8 % (ref 0–1.8)
BASOPHILS # BLD: 0.04 K/UL (ref 0–0.12)
BILIRUB SERPL-MCNC: 0.5 MG/DL (ref 0.1–1.5)
BILIRUB UR QL STRIP.AUTO: NEGATIVE
BUN SERPL-MCNC: 15 MG/DL (ref 8–22)
CALCIUM SERPL-MCNC: 8.7 MG/DL (ref 8.5–10.5)
CHLORIDE SERPL-SCNC: 108 MMOL/L (ref 96–112)
CO2 SERPL-SCNC: 27 MMOL/L (ref 20–33)
COLOR UR: YELLOW
CORTIS SERPL-MCNC: 15.1 UG/DL (ref 0–23)
CREAT SERPL-MCNC: 0.9 MG/DL (ref 0.5–1.4)
EKG IMPRESSION: NORMAL
EOSINOPHIL # BLD AUTO: 0 K/UL (ref 0–0.51)
EOSINOPHIL NFR BLD: 0 % (ref 0–6.9)
EPI CELLS #/AREA URNS HPF: ABNORMAL /HPF
ERYTHROCYTE [DISTWIDTH] IN BLOOD BY AUTOMATED COUNT: 49.3 FL (ref 35.9–50)
FLUAV RNA SPEC QL NAA+PROBE: NEGATIVE
FLUBV RNA SPEC QL NAA+PROBE: NEGATIVE
GLOBULIN SER CALC-MCNC: 2.8 G/DL (ref 1.9–3.5)
GLUCOSE SERPL-MCNC: 84 MG/DL (ref 65–99)
GLUCOSE UR STRIP.AUTO-MCNC: NEGATIVE MG/DL
HCT VFR BLD AUTO: 43.4 % (ref 37–47)
HGB BLD-MCNC: 14.5 G/DL (ref 12–16)
IMM GRANULOCYTES # BLD AUTO: 0.02 K/UL (ref 0–0.11)
IMM GRANULOCYTES NFR BLD AUTO: 0.4 % (ref 0–0.9)
KETONES UR STRIP.AUTO-MCNC: NEGATIVE MG/DL
LEUKOCYTE ESTERASE UR QL STRIP.AUTO: NEGATIVE
LV EJECT FRACT  99904: 65
LV EJECT FRACT MOD 2C 99903: 57
LV EJECT FRACT MOD 4C 99902: 61.75
LV EJECT FRACT MOD BP 99901: 58.69
LYMPHOCYTES # BLD AUTO: 1.14 K/UL (ref 1–4.8)
LYMPHOCYTES NFR BLD: 23.8 % (ref 22–41)
MAGNESIUM SERPL-MCNC: 2.3 MG/DL (ref 1.5–2.5)
MCH RBC QN AUTO: 31.8 PG (ref 27–33)
MCHC RBC AUTO-ENTMCNC: 33.4 G/DL (ref 33.6–35)
MCV RBC AUTO: 95.2 FL (ref 81.4–97.8)
MICRO URNS: ABNORMAL
MONOCYTES # BLD AUTO: 0.53 K/UL (ref 0–0.85)
MONOCYTES NFR BLD AUTO: 11.1 % (ref 0–13.4)
NEUTROPHILS # BLD AUTO: 3.05 K/UL (ref 2–7.15)
NEUTROPHILS NFR BLD: 63.9 % (ref 44–72)
NITRITE UR QL STRIP.AUTO: NEGATIVE
NRBC # BLD AUTO: 0 K/UL
NRBC BLD-RTO: 0 /100 WBC
PH UR STRIP.AUTO: 5.5 [PH]
PLATELET # BLD AUTO: 149 K/UL (ref 164–446)
PMV BLD AUTO: 10.4 FL (ref 9–12.9)
POTASSIUM SERPL-SCNC: 4.1 MMOL/L (ref 3.6–5.5)
PROT SERPL-MCNC: 6.9 G/DL (ref 6–8.2)
PROT UR QL STRIP: NEGATIVE MG/DL
RBC # BLD AUTO: 4.56 M/UL (ref 4.2–5.4)
RBC # URNS HPF: ABNORMAL /HPF
RBC UR QL AUTO: ABNORMAL
SODIUM SERPL-SCNC: 142 MMOL/L (ref 135–145)
SP GR UR STRIP.AUTO: >=1.045
TROPONIN I SERPL-MCNC: 0.01 NG/ML (ref 0–0.04)
UROBILINOGEN UR STRIP.AUTO-MCNC: 0.2 MG/DL
WBC # BLD AUTO: 4.8 K/UL (ref 4.8–10.8)
WBC #/AREA URNS HPF: ABNORMAL /HPF

## 2019-03-03 PROCEDURE — 71045 X-RAY EXAM CHEST 1 VIEW: CPT

## 2019-03-03 PROCEDURE — 83735 ASSAY OF MAGNESIUM: CPT

## 2019-03-03 PROCEDURE — 93306 TTE W/DOPPLER COMPLETE: CPT | Mod: 26 | Performed by: INTERNAL MEDICINE

## 2019-03-03 PROCEDURE — 85025 COMPLETE CBC W/AUTO DIFF WBC: CPT

## 2019-03-03 PROCEDURE — 70498 CT ANGIOGRAPHY NECK: CPT

## 2019-03-03 PROCEDURE — 70496 CT ANGIOGRAPHY HEAD: CPT

## 2019-03-03 PROCEDURE — 99220 PR INITIAL OBSERVATION CARE,LEVL III: CPT | Performed by: HOSPITALIST

## 2019-03-03 PROCEDURE — 99285 EMERGENCY DEPT VISIT HI MDM: CPT

## 2019-03-03 PROCEDURE — G0378 HOSPITAL OBSERVATION PER HR: HCPCS

## 2019-03-03 PROCEDURE — 93005 ELECTROCARDIOGRAM TRACING: CPT | Performed by: EMERGENCY MEDICINE

## 2019-03-03 PROCEDURE — 96372 THER/PROPH/DIAG INJ SC/IM: CPT

## 2019-03-03 PROCEDURE — 700111 HCHG RX REV CODE 636 W/ 250 OVERRIDE (IP): Performed by: HOSPITALIST

## 2019-03-03 PROCEDURE — 87502 INFLUENZA DNA AMP PROBE: CPT

## 2019-03-03 PROCEDURE — A9270 NON-COVERED ITEM OR SERVICE: HCPCS | Performed by: HOSPITALIST

## 2019-03-03 PROCEDURE — 81001 URINALYSIS AUTO W/SCOPE: CPT

## 2019-03-03 PROCEDURE — 84484 ASSAY OF TROPONIN QUANT: CPT

## 2019-03-03 PROCEDURE — 70551 MRI BRAIN STEM W/O DYE: CPT

## 2019-03-03 PROCEDURE — 700102 HCHG RX REV CODE 250 W/ 637 OVERRIDE(OP): Performed by: HOSPITALIST

## 2019-03-03 PROCEDURE — 80053 COMPREHEN METABOLIC PANEL: CPT

## 2019-03-03 PROCEDURE — 93306 TTE W/DOPPLER COMPLETE: CPT

## 2019-03-03 PROCEDURE — 700117 HCHG RX CONTRAST REV CODE 255: Performed by: EMERGENCY MEDICINE

## 2019-03-03 PROCEDURE — 83036 HEMOGLOBIN GLYCOSYLATED A1C: CPT

## 2019-03-03 PROCEDURE — 82533 TOTAL CORTISOL: CPT

## 2019-03-03 RX ORDER — ACETAMINOPHEN 325 MG/1
650 TABLET ORAL EVERY 6 HOURS PRN
Status: DISCONTINUED | OUTPATIENT
Start: 2019-03-03 | End: 2019-03-04 | Stop reason: HOSPADM

## 2019-03-03 RX ORDER — POLYETHYLENE GLYCOL 3350 17 G/17G
1 POWDER, FOR SOLUTION ORAL
Status: DISCONTINUED | OUTPATIENT
Start: 2019-03-03 | End: 2019-03-04 | Stop reason: HOSPADM

## 2019-03-03 RX ORDER — ASPIRIN 325 MG
325 TABLET ORAL DAILY
Status: DISCONTINUED | OUTPATIENT
Start: 2019-03-03 | End: 2019-03-04 | Stop reason: HOSPADM

## 2019-03-03 RX ORDER — ONDANSETRON 4 MG/1
4 TABLET, ORALLY DISINTEGRATING ORAL EVERY 4 HOURS PRN
Status: DISCONTINUED | OUTPATIENT
Start: 2019-03-03 | End: 2019-03-04 | Stop reason: HOSPADM

## 2019-03-03 RX ORDER — ONDANSETRON 2 MG/ML
4 INJECTION INTRAMUSCULAR; INTRAVENOUS EVERY 4 HOURS PRN
Status: DISCONTINUED | OUTPATIENT
Start: 2019-03-03 | End: 2019-03-04 | Stop reason: HOSPADM

## 2019-03-03 RX ORDER — LEVOTHYROXINE SODIUM 112 UG/1
112 TABLET ORAL
Status: DISCONTINUED | OUTPATIENT
Start: 2019-03-03 | End: 2019-03-04 | Stop reason: HOSPADM

## 2019-03-03 RX ORDER — GABAPENTIN 100 MG/1
100 CAPSULE ORAL DAILY
Status: DISCONTINUED | OUTPATIENT
Start: 2019-03-03 | End: 2019-03-04 | Stop reason: HOSPADM

## 2019-03-03 RX ORDER — AMOXICILLIN 250 MG
2 CAPSULE ORAL 2 TIMES DAILY
Status: DISCONTINUED | OUTPATIENT
Start: 2019-03-03 | End: 2019-03-04 | Stop reason: HOSPADM

## 2019-03-03 RX ORDER — ASPIRIN 300 MG/1
300 SUPPOSITORY RECTAL DAILY
Status: DISCONTINUED | OUTPATIENT
Start: 2019-03-03 | End: 2019-03-04 | Stop reason: HOSPADM

## 2019-03-03 RX ORDER — ASPIRIN 81 MG/1
324 TABLET, CHEWABLE ORAL DAILY
Status: DISCONTINUED | OUTPATIENT
Start: 2019-03-03 | End: 2019-03-04 | Stop reason: HOSPADM

## 2019-03-03 RX ORDER — BISACODYL 10 MG
10 SUPPOSITORY, RECTAL RECTAL
Status: DISCONTINUED | OUTPATIENT
Start: 2019-03-03 | End: 2019-03-04 | Stop reason: HOSPADM

## 2019-03-03 RX ORDER — ATORVASTATIN CALCIUM 10 MG/1
10 TABLET, FILM COATED ORAL EVERY EVENING
Status: DISCONTINUED | OUTPATIENT
Start: 2019-03-03 | End: 2019-03-04 | Stop reason: HOSPADM

## 2019-03-03 RX ORDER — OMEPRAZOLE 20 MG/1
20 CAPSULE, DELAYED RELEASE ORAL DAILY
Status: DISCONTINUED | OUTPATIENT
Start: 2019-03-03 | End: 2019-03-04 | Stop reason: HOSPADM

## 2019-03-03 RX ORDER — TIOTROPIUM BROMIDE 18 UG/1
1 CAPSULE ORAL; RESPIRATORY (INHALATION) DAILY
Status: DISCONTINUED | OUTPATIENT
Start: 2019-03-04 | End: 2019-03-04 | Stop reason: HOSPADM

## 2019-03-03 RX ADMIN — GABAPENTIN 100 MG: 100 CAPSULE ORAL at 14:35

## 2019-03-03 RX ADMIN — ATORVASTATIN CALCIUM 10 MG: 10 TABLET, FILM COATED ORAL at 18:34

## 2019-03-03 RX ADMIN — ENOXAPARIN SODIUM 40 MG: 100 INJECTION SUBCUTANEOUS at 15:50

## 2019-03-03 RX ADMIN — SENNOSIDES AND DOCUSATE SODIUM 2 TABLET: 8.6; 5 TABLET ORAL at 18:34

## 2019-03-03 RX ADMIN — OMEPRAZOLE 20 MG: 20 CAPSULE, DELAYED RELEASE ORAL at 14:35

## 2019-03-03 RX ADMIN — ASPIRIN 81 MG 324 MG: 81 TABLET ORAL at 14:35

## 2019-03-03 RX ADMIN — IOHEXOL 100 ML: 350 INJECTION, SOLUTION INTRAVENOUS at 11:30

## 2019-03-03 RX ADMIN — LEVOTHYROXINE SODIUM 112 MCG: 112 TABLET ORAL at 15:41

## 2019-03-03 ASSESSMENT — COPD QUESTIONNAIRES
DO YOU EVER COUGH UP ANY MUCUS OR PHLEGM?: NO/ONLY WITH OCCASIONAL COLDS OR INFECTIONS
DURING THE PAST 4 WEEKS HOW MUCH DID YOU FEEL SHORT OF BREATH: NONE/LITTLE OF THE TIME
COPD SCREENING SCORE: 2
IN THE PAST 12 MONTHS DO YOU DO LESS THAN YOU USED TO BECAUSE OF YOUR BREATHING PROBLEMS: DISAGREE/UNSURE
HAVE YOU SMOKED AT LEAST 100 CIGARETTES IN YOUR ENTIRE LIFE: NO/DON'T KNOW
DO YOU EVER COUGH UP ANY MUCUS OR PHLEGM?: NO/ONLY WITH OCCASIONAL COLDS OR INFECTIONS
HAVE YOU SMOKED AT LEAST 100 CIGARETTES IN YOUR ENTIRE LIFE: NO/DON'T KNOW
DURING THE PAST 4 WEEKS HOW MUCH DID YOU FEEL SHORT OF BREATH: NONE/LITTLE OF THE TIME
COPD SCREENING SCORE: 2

## 2019-03-03 ASSESSMENT — ENCOUNTER SYMPTOMS
COUGH: 0
VOMITING: 0
ABDOMINAL PAIN: 0
DIZZINESS: 1
BACK PAIN: 0
NAUSEA: 0
FEVER: 0
LOSS OF CONSCIOUSNESS: 0
DIARRHEA: 0
CHILLS: 0
HEADACHES: 1
SHORTNESS OF BREATH: 0

## 2019-03-03 ASSESSMENT — PATIENT HEALTH QUESTIONNAIRE - PHQ9
2. FEELING DOWN, DEPRESSED, IRRITABLE, OR HOPELESS: NOT AT ALL
SUM OF ALL RESPONSES TO PHQ9 QUESTIONS 1 AND 2: 0
1. LITTLE INTEREST OR PLEASURE IN DOING THINGS: NOT AT ALL
1. LITTLE INTEREST OR PLEASURE IN DOING THINGS: NOT AT ALL
SUM OF ALL RESPONSES TO PHQ9 QUESTIONS 1 AND 2: 0
2. FEELING DOWN, DEPRESSED, IRRITABLE, OR HOPELESS: NOT AT ALL

## 2019-03-03 ASSESSMENT — LIFESTYLE VARIABLES
EVER_SMOKED: YES
DO YOU DRINK ALCOHOL: NO
EVER_SMOKED: YES

## 2019-03-03 NOTE — ED NOTES
Family called, pt gave permission to speak to any family, granddaughter on the way in, family given poc and admission , no questions at this time.

## 2019-03-03 NOTE — ED TRIAGE NOTES
Pt biba for headache and feeling off balance, pt states she feels like she is spinning which is causing the pt to be off balance. Pt has headache to top of head since this am when the dizziness started.  Pt alert and oriented

## 2019-03-03 NOTE — ED PROVIDER NOTES
ED PROVIDER NOTE    Scribed for Renetta Onofre M.D. by Jaspal Kelly. 3/3/2019, 12:25 PM.    This is an addendum to the note on Mary Murrieta. For further details and full chart entry, see the previously signed ED Provider Note written by Dr. Carrero (Resident).      12:20 PM Reviewed the patient's lab and radiology results.     Results for orders placed or performed during the hospital encounter of 03/03/19   CBC WITH DIFFERENTIAL   Result Value Ref Range    WBC 4.8 4.8 - 10.8 K/uL    RBC 4.56 4.20 - 5.40 M/uL    Hemoglobin 14.5 12.0 - 16.0 g/dL    Hematocrit 43.4 37.0 - 47.0 %    MCV 95.2 81.4 - 97.8 fL    MCH 31.8 27.0 - 33.0 pg    MCHC 33.4 (L) 33.6 - 35.0 g/dL    RDW 49.3 35.9 - 50.0 fL    Platelet Count 149 (L) 164 - 446 K/uL    MPV 10.4 9.0 - 12.9 fL    Neutrophils-Polys 63.90 44.00 - 72.00 %    Lymphocytes 23.80 22.00 - 41.00 %    Monocytes 11.10 0.00 - 13.40 %    Eosinophils 0.00 0.00 - 6.90 %    Basophils 0.80 0.00 - 1.80 %    Immature Granulocytes 0.40 0.00 - 0.90 %    Nucleated RBC 0.00 /100 WBC    Neutrophils (Absolute) 3.05 2.00 - 7.15 K/uL    Lymphs (Absolute) 1.14 1.00 - 4.80 K/uL    Monos (Absolute) 0.53 0.00 - 0.85 K/uL    Eos (Absolute) 0.00 0.00 - 0.51 K/uL    Baso (Absolute) 0.04 0.00 - 0.12 K/uL    Immature Granulocytes (abs) 0.02 0.00 - 0.11 K/uL    NRBC (Absolute) 0.00 K/uL   CMP   Result Value Ref Range    Sodium 142 135 - 145 mmol/L    Potassium 4.1 3.6 - 5.5 mmol/L    Chloride 108 96 - 112 mmol/L    Co2 27 20 - 33 mmol/L    Anion Gap 7.0 0.0 - 11.9    Glucose 84 65 - 99 mg/dL    Bun 15 8 - 22 mg/dL    Creatinine 0.90 0.50 - 1.40 mg/dL    Calcium 8.7 8.5 - 10.5 mg/dL    AST(SGOT) 16 12 - 45 U/L    ALT(SGPT) 8 2 - 50 U/L    Alkaline Phosphatase 70 30 - 99 U/L    Total Bilirubin 0.5 0.1 - 1.5 mg/dL    Albumin 4.1 3.2 - 4.9 g/dL    Total Protein 6.9 6.0 - 8.2 g/dL    Globulin 2.8 1.9 - 3.5 g/dL    A-G Ratio 1.5 g/dL   TROPONIN   Result Value Ref Range    Troponin I 0.01 0.00 - 0.04  ng/mL   ESTIMATED GFR   Result Value Ref Range    GFR If African American >60 >60 mL/min/1.73 m 2    GFR If Non African American 59 (A) >60 mL/min/1.73 m 2   Influenza A/B By PCR (Adult - Flu Only)   Result Value Ref Range    Influenza virus A RNA Negative Negative    Influenza virus B, PCR Negative Negative   CORTISOL   Result Value Ref Range    Cortisol 15.1 0.0 - 23.0 ug/dL   EKG   Result Value Ref Range    Report       [facility] Emergency Dept.    Test Date:  2019  Pt Name:    PINA MOCTEZUMA                 Department: ER  MRN:        [id number]                      Room:        22  Gender:     Female                       Technician: [id number]  :        [id number]                   Requested By:ER TRIAGE PROTOCOL  Order #:    [id number]                    Reading MD: MELINDA MORRIS MD    Measurements  Intervals                                Axis  Rate:       82                           P:          69  DC:         204                          QRS:        59  QRSD:       110                          T:          26  QT:         420  QTc:        491    Interpretive Statements  SINUS RHYTHM  VENTRICULAR PREMATURE COMPLEX  NONSPECIFIC INTRAVENTRICULAR CONDUCTION DELAY  BORDERLINE LOW VOLTAGE IN FRONTAL LEADS  PROBABLE LEFT VENTRICULAR HYPERTROPHY  Compared to ECG 2018 14:58:01  Ventricular premature complex(es) now present  Intraventricular conduction delay now present    Elec tronically Signed On 3-3-2019 10:04:28 PST by MELINDA MORRIS MD      All lab results reviewed by me.      DX-CHEST-PORTABLE (1 VIEW)   Final Result         1. No acute cardiopulmonary abnormalities are identified.      CT-CTA NECK WITH & W/O-POST PROCESSING   Final Result         1. No evidence of flow-limiting stenosis in the cervical carotid or cervical vertebral arteries.      CT-CTA HEAD WITH & W/O-POST PROCESS   Final Result         1. No hemodynamically significant narrowing of the major intracranial vessels.       2. No acute intracranial hemorrhage.      EC-ECHOCARDIOGRAM COMPLETE W/O CONT    (Results Pending)   MR-BRAIN-W/O    (Results Pending)    All radiology and interpretations reviewed by me.     12:25 PM - Patient evaluated at bedside. She is currently complaining of a slight headache. Informed patient her lab and imaging results look good today, however I want to admit her for further monitoring and to undergo a MRI.   No complaints of chest pain or shortness of breath.     12:31 PM I discussed the patient's case and the above findings with Dr. Caballero (Hospitalist) who will admit.      DISPOSITION:  Patient will be admitted to Hospitalist in guarded condition.     FINAL IMPRESSION   1. Ataxia    2. Weakness          Jaspal KATZ (Scribe), am scribing for, and in the presence of, Renetta Onofre M.D..    Electronically signed by: Jaspal Kelly (Scribe), 3/3/2019    Renetta KATZ M.D. personally performed the services described in this documentation, as scribed by Jaspal Kelly in my presence, and it is both accurate and complete.    The note accurately reflects work and decisions made by me.  Renetta Onofre  3/3/2019  3:10 PM

## 2019-03-03 NOTE — ED PROVIDER NOTES
CHIEF COMPLAINT(1/4)  Chief Complaint   Patient presents with   • Headache   • Dizziness       HPI  Mary Murrieta is a 86 y.o. Female, with a past medical history of hypothyroidism, GERD, osteoporosis with spontaneous compression fracture L4, who presents, to the emergency department of [facility] this am, after she felt dizzy when she got up the same and was walking to her bathroom, and felt weak, and collapsed on a couch before hitting the floor, denies any chest pain or palpitations, no nausea or vomiting, no history of recent fevers, has a chronic cough which he states is secondary to her COPD for which she is on home inhaler Symbicort, denies any loss of consciousness, following the fall, no ENT bleeds, which was followed by a headache which is bilateral, minimally throbbing, no no photophobia or phonophobia, or weakness in the limbs.  Patient had blood studies done which revealed a hemoglobin of 14.5, WBC of 4.8 and a platelet count of 149, with electrolytes within normal limits, serum calcium of 8.7, troponin of 0.01 and a EKG which shows no significant acute ischemic changes as compared to the older EKG  CT CT of the head with and without contrast reveals no evidence of acute intracranial hemorrhage or no hemodynamically significant narrowing of the major intracranial vessels.  CT CT of the neck reveals no evidence of flow-limiting stenosis in the cervical, carotid or cervical vertebral arteries  REVIEW OF SYSTEMS(1/10)  Pertinent positives include: Headache, dizziness, and shortness of breath on exertion  Pertinent negatives include: No weakness, no evidence of slurring of speech.  No fever.  No nausea or vomiting.  No tinnitus.   All other systems are negative.     PAST MEDICAL HISTORY(PFS1,2)  Past Medical History:   Diagnosis Date   • Aortic aneurysm without rupture (HCC)    • Cataract     +surgery bilat   • Compression fracture of fourth lumbar vertebra (HCC) 3/28/2018   • Dental disorder  "03/20/2018    upper and lower   • Emphysema of lung (HCC)    • Gastroesophageal reflux disease without esophagitis 5/11/2018   • Hiatus hernia syndrome    • Hypothyroidism 2/8/2016   • Mixed hyperlipidemia 12/7/2016   • Protein-calorie malnutrition (HCC) 3/28/2018       FAMILY HISTORY  Family History   Problem Relation Age of Onset   • Cancer Mother    • Cancer Father         esophageal   • Stroke Father    • Lung Disease Sister         emphysema       SOCIAL HISTORY  Social History   Substance Use Topics   • Smoking status: Current Every Day Smoker     Packs/day: 1.00     Years: 50.00     Types: Cigarettes   • Smokeless tobacco: Never Used   • Alcohol use 0.6 oz/week     1 Glasses of wine per week      Comment: 3 per week     History   Drug Use No       SURGICAL HISTORY  Past Surgical History:   Procedure Laterality Date   • ABDOMINAL HYSTERECTOMY TOTAL  1986   • TONSILLECTOMY  1938   • THYROIDECTOMY      1976       CURRENT MEDICATIONS  Home Medications     Reviewed by Suellen Wilde R.N. (Registered Nurse) on 03/03/19 at 0921  Med List Status: Not Addressed   Medication Last Dose Status   aspirin EC (ECOTRIN) 81 MG Tablet Delayed Response  Active   atorvastatin (LIPITOR) 10 MG Tab  Active   gabapentin (NEURONTIN) 100 MG Cap  Active   levothyroxine (SYNTHROID) 112 MCG Tab  Active   Nutritional Supplements (VITAMIN D BOOSTER PO)  Active   omeprazole (PRILOSEC) 20 MG delayed-release capsule  Active   tramadol (ULTRAM) 50 MG Tab  Active                ALLERGIES  No Known Allergies    PHYSICAL EXAM  VITAL SIGNS: /105   Pulse (!) 110   Temp 37.3 °C (99.1 °F) (Temporal)   Resp 16   Ht 1.6 m (5' 3\")   Wt 42 kg (92 lb 9.5 oz)   SpO2 97%   BMI 16.40 kg/m²  Reviewed and evaluated.    Physical Exam   Constitutional: She is oriented to person, place, and time. She appears well-developed and well-nourished.   HENT:   Head: Normocephalic and atraumatic.   Eyes: Pupils are equal, round, and reactive to light. EOM " are normal.   Neck: Normal range of motion. Neck supple.   Cardiovascular: Normal rate, regular rhythm and normal heart sounds.  Exam reveals no gallop and no friction rub.    No murmur heard.  Pulmonary/Chest: Effort normal and breath sounds normal. No respiratory distress. She has no wheezes. She has no rales.   Abdominal: Soft. Bowel sounds are normal. She exhibits no distension and no mass. There is tenderness. There is no rebound and no guarding.   Pain in epigastric region and para umbilical region positive   Musculoskeletal: Normal range of motion. She exhibits no edema, tenderness or deformity.   Neurological: She is alert and oriented to person, place, and time. She displays normal reflexes. No cranial nerve deficit. She exhibits normal muscle tone.   Skin: Skin is warm and dry. Capillary refill takes less than 2 seconds.   Psychiatric: She has a normal mood and affect. Judgment normal.       DIFFERENTIAL DIAGNOSIS:  #Presyncope.  #Generalized weakness with debility.  #Ataxia rule out cerebellar causes  EKG  No acute ischemic changes noticed as compared to the previous EKG    RADIOLOGY/PROCEDURES  No orders to display       LABORATORY: Reviewed as below.  Results for orders placed or performed during the hospital encounter of 03/03/19   CBC WITH DIFFERENTIAL   Result Value Ref Range    WBC 4.8 4.8 - 10.8 K/uL    RBC 4.56 4.20 - 5.40 M/uL    Hemoglobin 14.5 12.0 - 16.0 g/dL    Hematocrit 43.4 37.0 - 47.0 %    MCV 95.2 81.4 - 97.8 fL    MCH 31.8 27.0 - 33.0 pg    MCHC 33.4 (L) 33.6 - 35.0 g/dL    RDW 49.3 35.9 - 50.0 fL    Platelet Count 149 (L) 164 - 446 K/uL    MPV 10.4 9.0 - 12.9 fL    Neutrophils-Polys 63.90 44.00 - 72.00 %    Lymphocytes 23.80 22.00 - 41.00 %    Monocytes 11.10 0.00 - 13.40 %    Eosinophils 0.00 0.00 - 6.90 %    Basophils 0.80 0.00 - 1.80 %    Immature Granulocytes 0.40 0.00 - 0.90 %    Nucleated RBC 0.00 /100 WBC    Neutrophils (Absolute) 3.05 2.00 - 7.15 K/uL    Lymphs (Absolute) 1.14  1.00 - 4.80 K/uL    Monos (Absolute) 0.53 0.00 - 0.85 K/uL    Eos (Absolute) 0.00 0.00 - 0.51 K/uL    Baso (Absolute) 0.04 0.00 - 0.12 K/uL    Immature Granulocytes (abs) 0.02 0.00 - 0.11 K/uL    NRBC (Absolute) 0.00 K/uL   CMP   Result Value Ref Range    Sodium 142 135 - 145 mmol/L    Potassium 4.1 3.6 - 5.5 mmol/L    Chloride 108 96 - 112 mmol/L    Co2 27 20 - 33 mmol/L    Anion Gap 7.0 0.0 - 11.9    Glucose 84 65 - 99 mg/dL    Bun 15 8 - 22 mg/dL    Creatinine 0.90 0.50 - 1.40 mg/dL    Calcium 8.7 8.5 - 10.5 mg/dL    AST(SGOT) 16 12 - 45 U/L    ALT(SGPT) 8 2 - 50 U/L    Alkaline Phosphatase 70 30 - 99 U/L    Total Bilirubin 0.5 0.1 - 1.5 mg/dL    Albumin 4.1 3.2 - 4.9 g/dL    Total Protein 6.9 6.0 - 8.2 g/dL    Globulin 2.8 1.9 - 3.5 g/dL    A-G Ratio 1.5 g/dL   TROPONIN   Result Value Ref Range    Troponin I 0.01 0.00 - 0.04 ng/mL   ESTIMATED GFR   Result Value Ref Range    GFR If African American >60 >60 mL/min/1.73 m 2    GFR If Non African American 59 (A) >60 mL/min/1.73 m 2   EKG   Result Value Ref Range    Report       [facility] Emergency Dept.    Test Date:  2019  Pt Name:    PINA MOCTEZUMA                 Department: ER  MRN:        [id number]                      Room:        22  Gender:     Female                       Technician: [id number]  :        [id number]                   Requested By:ER TRIAGE PROTOCOL  Order #:    [id number]                    Reading MD: MELINDA MORRIS MD    Measurements  Intervals                                Axis  Rate:       82                           P:          69  NJ:         204                          QRS:        59  QRSD:       110                          T:          26  QT:         420  QTc:        491    Interpretive Statements  SINUS RHYTHM  VENTRICULAR PREMATURE COMPLEX  NONSPECIFIC INTRAVENTRICULAR CONDUCTION DELAY  BORDERLINE LOW VOLTAGE IN FRONTAL LEADS  PROBABLE LEFT VENTRICULAR HYPERTROPHY  Compared to ECG 2018  14:58:01  Ventricular premature complex(es) now present  Intraventricular conduction delay now present    Elec tronically Signed On 3-3-2019 10:04:28 PST by MELINDA MORRIS MD         INTERVENTIONS:    Response: Improving    COURSE & MEDICAL DECISION MAKING  10:30 A.m. discussed with , ERP physician at [facility], chest x-ray CT scan of the head troponin levels CTA head and neck, influenza testing and review.  12:20 PM: CT CTA of the head, reviewed no hemodynamically significant narrowing of major intracranial vessels, and no evidence of acute intracranial hemorrhage.  CT CT of the neck with and without contrast reviewed with no evidence of flow-limiting stenosis in the cervical, carotid or cervical vertebral arteries.    PLAN:  Rule out cerebellar ataxia, will be admitted to the hospitalist for further investigations of ataxia with an MRI and a neurology opinion to rule out posterior fossa tumors or stroke    CONDITION: Stable    FINAL IMPRESSION.  Rule out cerebellar ataxia  Presyncope secondary to weakness        Electronically signed by: Brenda Carrero, 3/3/2019 10:22 AM

## 2019-03-04 ENCOUNTER — PATIENT OUTREACH (OUTPATIENT)
Dept: HEALTH INFORMATION MANAGEMENT | Facility: OTHER | Age: 84
End: 2019-03-04

## 2019-03-04 VITALS
SYSTOLIC BLOOD PRESSURE: 133 MMHG | DIASTOLIC BLOOD PRESSURE: 64 MMHG | RESPIRATION RATE: 20 BRPM | HEART RATE: 61 BPM | WEIGHT: 92.37 LBS | TEMPERATURE: 97.4 F | BODY MASS INDEX: 16.37 KG/M2 | HEIGHT: 63 IN | OXYGEN SATURATION: 99 %

## 2019-03-04 PROBLEM — Z86.73 HISTORY OF CVA (CEREBROVASCULAR ACCIDENT): Status: ACTIVE | Noted: 2019-03-04

## 2019-03-04 LAB
CHOLEST SERPL-MCNC: 154 MG/DL (ref 100–199)
EST. AVERAGE GLUCOSE BLD GHB EST-MCNC: 111 MG/DL
HBA1C MFR BLD: 5.5 % (ref 0–5.6)
HDLC SERPL-MCNC: 63 MG/DL
LDLC SERPL CALC-MCNC: 80 MG/DL
TRIGL SERPL-MCNC: 56 MG/DL (ref 0–149)

## 2019-03-04 PROCEDURE — 96372 THER/PROPH/DIAG INJ SC/IM: CPT

## 2019-03-04 PROCEDURE — 97161 PT EVAL LOW COMPLEX 20 MIN: CPT

## 2019-03-04 PROCEDURE — 80061 LIPID PANEL: CPT

## 2019-03-04 PROCEDURE — 700102 HCHG RX REV CODE 250 W/ 637 OVERRIDE(OP): Performed by: HOSPITALIST

## 2019-03-04 PROCEDURE — A9270 NON-COVERED ITEM OR SERVICE: HCPCS | Performed by: HOSPITALIST

## 2019-03-04 PROCEDURE — 99217 PR OBSERVATION CARE DISCHARGE: CPT | Performed by: HOSPITALIST

## 2019-03-04 PROCEDURE — G0378 HOSPITAL OBSERVATION PER HR: HCPCS

## 2019-03-04 PROCEDURE — 700111 HCHG RX REV CODE 636 W/ 250 OVERRIDE (IP): Performed by: HOSPITALIST

## 2019-03-04 PROCEDURE — 97165 OT EVAL LOW COMPLEX 30 MIN: CPT

## 2019-03-04 RX ORDER — ATORVASTATIN CALCIUM 40 MG/1
10 TABLET, FILM COATED ORAL
Qty: 30 TAB | Refills: 1 | Status: SHIPPED | OUTPATIENT
Start: 2019-03-04 | End: 2019-09-03 | Stop reason: SDUPTHER

## 2019-03-04 RX ADMIN — ASPIRIN 325 MG: 325 TABLET ORAL at 06:17

## 2019-03-04 RX ADMIN — LEVOTHYROXINE SODIUM 112 MCG: 112 TABLET ORAL at 06:16

## 2019-03-04 RX ADMIN — TIOTROPIUM BROMIDE 1 CAPSULE: 18 CAPSULE ORAL; RESPIRATORY (INHALATION) at 06:17

## 2019-03-04 RX ADMIN — GABAPENTIN 100 MG: 100 CAPSULE ORAL at 06:16

## 2019-03-04 RX ADMIN — OMEPRAZOLE 20 MG: 20 CAPSULE, DELAYED RELEASE ORAL at 06:17

## 2019-03-04 RX ADMIN — ENOXAPARIN SODIUM 40 MG: 100 INJECTION SUBCUTANEOUS at 06:16

## 2019-03-04 ASSESSMENT — ACTIVITIES OF DAILY LIVING (ADL): TOILETING: INDEPENDENT

## 2019-03-04 ASSESSMENT — COGNITIVE AND FUNCTIONAL STATUS - GENERAL
SUGGESTED CMS G CODE MODIFIER DAILY ACTIVITY: CJ
SUGGESTED CMS G CODE MODIFIER MOBILITY: CH
MOBILITY SCORE: 24
TOILETING: A LITTLE
PERSONAL GROOMING: A LITTLE
HELP NEEDED FOR BATHING: A LITTLE
DAILY ACTIVITIY SCORE: 21

## 2019-03-04 ASSESSMENT — GAIT ASSESSMENTS
DISTANCE (FEET): 150
GAIT LEVEL OF ASSIST: STAND BY ASSIST
ASSISTIVE DEVICE: FRONT WHEEL WALKER

## 2019-03-04 NOTE — ASSESSMENT & PLAN NOTE
I counseled the patient today time 7 minutes.  She does not appear very eager to quit but will continue to encourage her and told her.

## 2019-03-04 NOTE — H&P
"Hospital Medicine History & Physical Note    Date of Service  3/3/2019    Primary Care Physician  Jayce Barron M.D.    Consultants      Code Status  Full    Chief Complaint  Dizziness    History of Presenting Illness  86 y.o. female who presented 3/3/2019 for evaluation of dizziness.  She reports she has been walking \"like a drunken \" for about a week.  Perhaps even more like 2 weeks.  She states when she walks she has to hold onto the wall to keep from falling.  She feels a spinning sensation which is worse when she sits or stands but is also present when she is lying down.  She has managed not to fall until today when she did suffer a ground-level fall.  She did not hit her head nor did she pass out.  She did not feel any significant pain after the fall.  She does however note as \"mild\" headache.  She has had this since she woke up this morning.  She had not had a headache earlier in the week.  She is brought in to the emergency room for evaluation    Review of Systems  Review of Systems   Constitutional: Negative for chills and fever.   Respiratory: Negative for cough and shortness of breath.    Cardiovascular: Negative for chest pain.   Gastrointestinal: Negative for abdominal pain, diarrhea, nausea and vomiting.   Musculoskeletal: Negative for back pain.   Skin: Negative for rash.   Neurological: Positive for dizziness and headaches. Negative for loss of consciousness.       Past Medical History   has a past medical history of Aortic aneurysm without rupture ([facility]); Cataract; Compression fracture of fourth lumbar vertebra (HCC) (3/28/2018); Dental disorder (03/20/2018); Emphysema of lung ([facility]); Gastroesophageal reflux disease without esophagitis (5/11/2018); Hiatus hernia syndrome; Hypothyroidism (2/8/2016); Mixed hyperlipidemia (12/7/2016); and Protein-calorie malnutrition (HCC) (3/28/2018).    Surgical History   has a past surgical history that includes tonsillectomy (1938); thyroidectomy; and abdominal " hysterectomy total (1986).     Family History  family history includes Cancer in her father and mother; Lung Disease in her sister; Stroke in her father.     Social History   reports that she has been smoking Cigarettes.  She has a 50.00 pack-year smoking history. She has never used smokeless tobacco. She reports that she drinks about 0.6 oz of alcohol per week . She reports that she does not use drugs.    Allergies  No Known Allergies    Medications  Prior to Admission Medications   Prescriptions Last Dose Informant Patient Reported? Taking?   Nutritional Supplements (VITAMIN D BOOSTER PO) 3/2/2019 at AM Patient Yes No   Sig: Take 1 Tab by mouth every day.   aspirin EC (ECOTRIN) 81 MG Tablet Delayed Response 3/2/2019 at AM Patient Yes No   Sig: Take 81 mg by mouth every day.   atorvastatin (LIPITOR) 10 MG Tab 3/2/2019 at PM Patient No No   Sig: TAKE 1 TABLET BY MOUTH EVERY DAY   gabapentin (NEURONTIN) 100 MG Cap 3/2/2019 at AM Patient Yes No   Sig: Take 100 mg by mouth every day.   levothyroxine (SYNTHROID) 112 MCG Tab 3/2/2019 at AM Patient No No   Sig: TAKE 1 TAB BY MOUTH EVERY MORNING ON AN EMPTY STOMACH.   omeprazole (PRILOSEC) 20 MG delayed-release capsule 3/2/2019 at AM Patient Yes No   Sig: Take 20 mg by mouth every day.      Facility-Administered Medications: None       Physical Exam  Temp:  [36.9 °C (98.4 °F)-37.7 °C (99.9 °F)] 36.9 °C (98.4 °F)  Pulse:  [] 80  Resp:  [16-18] 17  BP: (129-158)/() 135/67  SpO2:  [88 %-98 %] 96 %    Physical Exam   Constitutional: She is oriented to person, place, and time. She appears well-developed and well-nourished. No distress.   HENT:   Head: Normocephalic and atraumatic.   Neck: No JVD present.   Cardiovascular: Normal rate and regular rhythm.    Pulmonary/Chest: Effort normal. No stridor. No respiratory distress. She has no wheezes. She has no rales.   Abdominal: Soft. There is no tenderness. There is no rebound and no guarding.   Musculoskeletal: She  exhibits no edema.   Neurological: She is alert and oriented to person, place, and time. She displays no atrophy and no tremor. No cranial nerve deficit or sensory deficit. She exhibits normal muscle tone. Coordination and gait abnormal. GCS eye subscore is 4. GCS verbal subscore is 5. GCS motor subscore is 6.   Skin: Skin is warm and dry. No rash noted. She is not diaphoretic.   Psychiatric: She has a normal mood and affect. Thought content normal.   Nursing note and vitals reviewed.      Laboratory:  Recent Labs      03/03/19   0922   WBC  4.8   RBC  4.56   HEMOGLOBIN  14.5   HEMATOCRIT  43.4   MCV  95.2   MCH  31.8   MCHC  33.4*   RDW  49.3   PLATELETCT  149*   MPV  10.4     Recent Labs      03/03/19   0922   SODIUM  142   POTASSIUM  4.1   CHLORIDE  108   CO2  27   GLUCOSE  84   BUN  15   CREATININE  0.90   CALCIUM  8.7     Recent Labs      03/03/19   0922   ALTSGPT  8   ASTSGOT  16   ALKPHOSPHAT  70   TBILIRUBIN  0.5   GLUCOSE  84                 Recent Labs      03/03/19   0922   TROPONINI  0.01       Urinalysis:    No results found     Imaging:  DX-CHEST-PORTABLE (1 VIEW)   Final Result         1. No acute cardiopulmonary abnormalities are identified.      CT-CTA NECK WITH & W/O-POST PROCESSING   Final Result         1. No evidence of flow-limiting stenosis in the cervical carotid or cervical vertebral arteries.      CT-CTA HEAD WITH & W/O-POST PROCESS   Final Result         1. No hemodynamically significant narrowing of the major intracranial vessels.      2. No acute intracranial hemorrhage.      EC-ECHOCARDIOGRAM COMPLETE W/O CONT    (Results Pending)   MR-BRAIN-W/O    (Results Pending)         Assessment/Plan:  I anticipate this patient is appropriate for observation status at this time.    Ataxia   Assessment & Plan    Symptoms going on for about 2 weeks now.  She appears to have some cerebellar deficits on my exam.  Neurologically she is otherwise intact.  She does have a mild headache however this  developed well after her symptoms started and appears to be quite slight.  I do not think that we need to do an LP.  Her imaging at this point is negative.  Patient will be admitted and we will do an MRI to rule out a there are post infarct.  We will consult PT and OT as well.     Acute renal failure superimposed on stage 2 chronic kidney disease (HCC)   Assessment & Plan    Mild kidney injury.  We will gently fluid resuscitate.  Avoid nephrotoxins.  Renally dose medications as appropriate     ETOH abuse   Assessment & Plan    Patient admits to 3 drinks daily.  Will monitor closely for signs of withdrawal     Other emphysema (HCC)   Assessment & Plan    Patient is on no home oxygen therapy.  We will place her on O2 and respiratory protocols.  Continue her Spiriva.  Lung exam today is relatively benign     Protein-calorie malnutrition (HCC)- (present on admission)   Assessment & Plan    We will ask dietary to consult     Current smoker- (present on admission)   Assessment & Plan    I counseled the patient today time 7 minutes.  She does not appear very eager to quit but will continue to encourage her and told her.     Hypothyroidism- (present on admission)   Assessment & Plan    Check TSH continue replacement         VTE prophylaxis: enoxaparin

## 2019-03-04 NOTE — DISCHARGE INSTRUCTIONS
Discharge Instructions    Discharged to home by car with relative. Discharged via wheelchair, hospital escort: Yes.  Special equipment needed: Not Applicable    Be sure to schedule a follow-up appointment with your primary care doctor or any specialists as instructed.     Discharge Plan:   Diet Plan: Discussed  Activity Level: Discussed  Smoking Cessation Offered: Patient Refused  Confirmed Follow up Appointment: Patient to Call and Schedule Appointment  Confirmed Symptoms Management: Discussed  Medication Reconciliation Updated: Yes  Influenza Vaccine Indication: Not indicated: Previously immunized this influenza season and > 8 years of age    I understand that a diet low in cholesterol, fat, and sodium is recommended for good health. Unless I have been given specific instructions below for another diet, I accept this instruction as my diet prescription.   Other diet: Regular    Special Instructions: None    · Is patient discharged on Warfarin / Coumadin?   No     Depression / Suicide Risk    As you are discharged from this [facility] facility, it is important to learn how to keep safe from harming yourself.    Recognize the warning signs:  · Abrupt changes in personality, positive or negative- including increase in energy   · Giving away possessions  · Change in eating patterns- significant weight changes-  positive or negative  · Change in sleeping patterns- unable to sleep or sleeping all the time   · Unwillingness or inability to communicate  · Depression  · Unusual sadness, discouragement and loneliness  · Talk of wanting to die  · Neglect of personal appearance   · Rebelliousness- reckless behavior  · Withdrawal from people/activities they love  · Confusion- inability to concentrate     If you or a loved one observes any of these behaviors or has concerns about self-harm, here's what you can do:  · Talk about it- your feelings and reasons for harming yourself  · Remove any means that you might use to hurt  yourself (examples: pills, rope, extension cords, firearm)  · Get professional help from the community (Mental Health, Substance Abuse, psychological counseling)  · Do not be alone:Call your Safe Contact- someone whom you trust who will be there for you.  · Call your local CRISIS HOTLINE 056-1166 or 907-973-3643  · Call your local Children's Mobile Crisis Response Team [place] (200) 598-8012 or www.[employer]  · Call the toll free National Suicide Prevention Hotlines   · National Suicide Prevention Lifeline 975-188-MPMK (1080)  · National Hope Line Network 800-SUICIDE (967-2890)

## 2019-03-04 NOTE — THERAPY
"Physical Therapy Evaluation completed.   Bed Mobility:  Supine to Sit: Stand by Assist (HOB flat )  Transfers: Sit to Stand: Stand by Assist  Gait: Level Of Assist: Stand by Assist with Front-Wheel Walker       Plan of Care: Patient with no further skilled PT needs in the acute care setting at this time  Discharge Recommendations: Equipment: No Equipment Needed. Post-acute therapy Discharge to home with outpatient or home health for additional skilled therapy services.    See \"Rehab Therapy-Acute\" Patient Summary Report for complete documentation.     Pt is a 86 year old female admitted for 1-2 week history of balance disturbances, reports walking \"like a drunken \", and recently fell at home with no major injuries sustained. PMH includes osteoporosis, spontaneous compression Fx at L4, COPD, and is not using supplemental oxygen at home. Prior to admit, Pt I with all ADL's, lives by herself in a single story home, 2 steps to enter, daughter lives nearby who can provide occasional A as needed. Upon arrival, Pt supine in bed with HOB elevated, reporting no current symptoms. Bed mobility performed with SBA, supine to sitting caused no onset of symptoms. Strength tested, WNL's in UE/LE. Sensation intact throughout, denies N/T. UE coordination impaired with nose to finger - slightly dysmetric, otherwise WNL's with TRUDY's and thumb to fingers. LE coordination WNL's with TRUDY's and heel to shin. Oculomotor exam performed, WNL's with no nystagmus noted with gaze holding or smooth pursuits, no onset of dizziness. Sit to stand with FWW, SBA and no change in symptoms. Pt ambulated in hallway x 150 feet with FWW, SBA. Steady gait throughout, no signs of ataxia, and no LOB observed. Pt reported onset of dizziness with horizontal head turns, though no report of \"spinning\". Pt agrees that FWW/4WW should be used for all ambulation to increase stability, as well as offload low back (reports chronic LBP). Upon returning to room, " "gaze stabilization exercises taught to pt, given handout, pt demonstrated understanding and able to execute exercise correctly. Horizontal head turns in walking, and sitting produced most comparable sign of dizziness, pt denies any sensation of \"spinning\". Pts signs and symptoms not consistent with BPPV, so Tiona-hallpike testing deferred. Pt does not demonstrated need for additional skilled PT while in the acute care setting. May benefit from post acute outpatient PT or  PT to address concerns related to balance and possible vestibular issues.       "

## 2019-03-04 NOTE — ASSESSMENT & PLAN NOTE
Symptoms going on for about 2 weeks now.  She appears to have some cerebellar deficits on my exam.  Neurologically she is otherwise intact.  She does have a mild headache however this developed well after her symptoms started and appears to be quite slight.  I do not think that we need to do an LP.  Her imaging at this point is negative.  Patient will be admitted and we will do an MRI to rule out a there are post infarct.  We will consult PT and OT as well.

## 2019-03-04 NOTE — THERAPY
"Occupational Therapy Evaluation completed.   Functional Status: Supine>sit with SPV and HOB elevated. Reported dizziness which resolved after about 1 min. LB dressing with SPV. Sit>stand with SBA and FWW. Ambulated to BR with FWW and SBA. Completed toileting with SPV and toilet txf with SBA. Ambulated back to sink in room; c/o slight dizziness, but resolved while standing at sink and v/cs to take deep breaths as desaturated to 88%, then quickly resolved to 91%. Completed oral care with adaptive technique d/t baseline  weakness with SPV. Req v/cs for FWW for safety. Returned to EOB with SBA and returned to supine with SPV. Reports she is eager to go home, and besides intermittent dizziness, feels she is at baseline. Educated on safety during surface changes/txfs.  Plan of Care: Patient with no further skilled OT needs in the acute care setting at this time  Discharge Recommendations:  Equipment: Grab Bars. Post-acute therapy Currently anticipate no further skilled therapy needs once patient is discharged from the inpatient setting.    See \"Rehab Therapy-Acute\" Patient Summary Report for complete documentation.      Pt is an 87 yo female admitted for ongoing dizziness and a GLF. PMHx of an aortic aneurysm, compression fx of L4, emphysema, tobacco abuse, and ETOH abuse. Completed ADLs/txfs with SBA-SPV with FWW and reports besides intermittent dizziness, she feels she is at baseline. Recommend no further acute OT, nor OT transitional services prior/after d/c home with dtr for intermittent assist.    "

## 2019-03-04 NOTE — DISCHARGE PLANNING
Care Transition Team Assessment    Spoke with patient at bedside. Anticipate no needs @ present time. Daughter will be ride @ D/C    Information Source  Orientation : Oriented x 4  Information Given By: Patient  Informant's Name: Mary Murrieta    Readmission Evaluation  Is this a readmission?: No    Interdisciplinary Discharge Planning  Does Admitting Nurse Feel This Could be a Complex Discharge?: No  Primary Care Physician: Beatrice  Lives with - Patient's Self Care Capacity: Alone and Able to Care For Self  Patient or legal guardian wants to designate a caregiver (see row info): Yes  Caregiver name: Jammie Ramirez  Caregiver relationship to patient: daughter  Caregiver contact info: [phone]  Support Systems: Children  Housing / Facility: [address]  Do You Take your Prescribed Medications Regularly: Yes  Able to Return to Previous ADL's: Yes  Mobility Issues: No  Prior Services: Home-Independent  Patient Expects to be Discharged to:: Home  Assistance Needed: No  Durable Medical Equipment: Not Applicable    Discharge Preparedness  What are your discharge supports?: Child  Prior Functional Level: Ambulatory    Functional Assesment  Prior Functional Level: Ambulatory    Finances  Prescription Coverage: Yes    Anticipated Discharge Information  Anticipated discharge disposition: Home  Discharge Address: [address] [place] [address]  Discharge Contact Phone Number: [phone]

## 2019-03-04 NOTE — ASSESSMENT & PLAN NOTE
Patient is on no home oxygen therapy.  We will place her on O2 and respiratory protocols.  Continue her Spiriva.  Lung exam today is relatively benign

## 2019-03-04 NOTE — PROGRESS NOTES
Discharge instructions, medications and follow-up reviewed with pt, pt verbalized understanding and denies questions. Discharge paperwork given to pt. PIV removed, TeleBox removed, armband removed. Pt transported off unit via wheelchair with hospital escort.

## 2019-03-04 NOTE — DISCHARGE SUMMARY
Discharge Summary    CHIEF COMPLAINT ON ADMISSION  Chief Complaint   Patient presents with   • Headache   • Dizziness       Reason for Admission  EMS 22     Admission Date  3/3/2019    CODE STATUS  Full Code    HPI & HOSPITAL COURSE  This is a 86 y.o. female here with dizziness and gait disturbance.  Please see the dictated history of present illness 3/3/2019 for complete details.  In short, the patient has history of chronic alcoholism consuming approximately 3 servings of alcohol nightly, tobacco dependence, COPD, protein calorie malnutrition and stage II chronic kidney disease who developed the above symptoms and was admitted for CVA rule out.  MRI of the brain was negative for acute CVA.  This did show chronic lacunar thalamic infarct.  Patient reports taking baby aspirin as well as 10 mg of atorvastatin daily.    Echocardiogram was obtained and was negative for significant valvular disease or evidence of heart failure.  Laboratory evaluation including CBC, BMP, urinalysis, cortisol, and influenza swabs are negative for acute suggestion.  Lipid panel was drawn and does show well suppressed total cholesterol 154, HDL at 63 and LDL 80.    Patient was evaluated by physical therapy which is deemed her stable for discharge home with outpatient or home health.  At discharge I will give her a referral to outpatient physical therapy, increase her statin slightly given her history of a lacunar infarct, and encouraged her to stop drinking and smoking.     Therefore, she is discharged in good and stable condition to home with close outpatient follow-up.    Discharge Date  3/4/2019    FOLLOW UP ITEMS POST DISCHARGE  Outpatient PT    DISCHARGE DIAGNOSES  Active Problems:    Hypothyroidism POA: Yes    Current smoker POA: Yes    Protein-calorie malnutrition (HCC) POA: Yes    Other emphysema (HCC) POA: Yes    ETOH abuse POA: Yes    Acute renal failure superimposed on stage 2 chronic kidney disease (HCC) POA: Yes    Ataxia POA:  Unknown  Resolved Problems:    * No resolved hospital problems. *      FOLLOW UP  No future appointments.  No follow-up provider specified.    MEDICATIONS ON DISCHARGE     Medication List      CONTINUE taking these medications      Instructions   aspirin EC 81 MG Tbec  Commonly known as:  ECOTRIN   Take 81 mg by mouth every day.  Dose:  81 mg     atorvastatin 10 MG Tabs  Commonly known as:  LIPITOR   TAKE 1 TABLET BY MOUTH EVERY DAY     gabapentin 100 MG Caps  Commonly known as:  NEURONTIN   Take 100 mg by mouth every day.  Dose:  100 mg     levothyroxine 112 MCG Tabs  Commonly known as:  SYNTHROID   TAKE 1 TAB BY MOUTH EVERY MORNING ON AN EMPTY STOMACH.  Dose:  112 mcg     omeprazole 20 MG delayed-release capsule  Commonly known as:  PRILOSEC   Take 20 mg by mouth every day.  Dose:  20 mg     VITAMIN D BOOSTER PO   Take 1 Tab by mouth every day.  Dose:  1 Tab            Allergies  No Known Allergies    DIET  Orders Placed This Encounter   Procedures   • Diet Order Regular     Standing Status:   Standing     Number of Occurrences:   1     Order Specific Question:   Diet:     Answer:   Regular [1]       ACTIVITY  As tolerated.  Weight bearing as tolerated    CONSULTATIONS  None    PROCEDURES  None    LABORATORY  Lab Results   Component Value Date    SODIUM 142 03/03/2019    POTASSIUM 4.1 03/03/2019    CHLORIDE 108 03/03/2019    CO2 27 03/03/2019    GLUCOSE 84 03/03/2019    BUN 15 03/03/2019    CREATININE 0.90 03/03/2019        Lab Results   Component Value Date    WBC 4.8 03/03/2019    HEMOGLOBIN 14.5 03/03/2019    HEMATOCRIT 43.4 03/03/2019    PLATELETCT 149 (L) 03/03/2019        Total time of the discharge process exceeds 36 minutes.

## 2019-03-04 NOTE — DISCHARGE PLANNING
PMR order received from Dr. Salazar.  SCP is shown for her medical provider.  Presented with H/A and dizziness resulting in a GLF.  Brain MRI is negative for an acute event.  W/U & TX evals are pending.  This referral will not be forwarded to Physiatry @ this time.  TCC remains monitoring.  I do appreciate the referral.

## 2019-03-04 NOTE — PROGRESS NOTES
Assessment done, Pt educated on plan of care. assisted to bathroom with fww   Patient resting in bed, no signs of distress,  no complains of pain at this time. Call light within reach,  side rails up, will monitor. Condition stable

## 2019-03-04 NOTE — ASSESSMENT & PLAN NOTE
Mild kidney injury.  We will gently fluid resuscitate.  Avoid nephrotoxins.  Renally dose medications as appropriate

## 2019-03-05 ENCOUNTER — PATIENT OUTREACH (OUTPATIENT)
Dept: HEALTH INFORMATION MANAGEMENT | Facility: OTHER | Age: 84
End: 2019-03-05

## 2019-03-05 RX ORDER — BUDESONIDE AND FORMOTEROL FUMARATE DIHYDRATE 160; 4.5 UG/1; UG/1
2 AEROSOL RESPIRATORY (INHALATION) 2 TIMES DAILY
COMMUNITY
End: 2019-05-28

## 2019-03-05 NOTE — PROGRESS NOTES
SCP post discharge med rec completed. No clinically significant medication issues noted. Patient denies any side effects, barriers to accessing medications, or trouble with adherence.     Patient is due for influenza vaccine. Also recommended Shingrix. Patient agrees to inquire at her pharmacy.     Was taking Symbicort PRN. Discussed that this is a maintenance inhaler to be taken daily to prevent progression of the disease. Patient understands and agrees.

## 2019-03-20 ENCOUNTER — TELEPHONE (OUTPATIENT)
Dept: MEDICAL GROUP | Facility: PHYSICIAN GROUP | Age: 84
End: 2019-03-20

## 2019-03-20 NOTE — TELEPHONE ENCOUNTER
VOICEMAIL  [facility]. [facility]                      Call Back Number: [phone] ext. [phone]    2. Message: RTC access paperwork should have been sent through right Fax but needs to be filled out. Can we give alt fax #    3. Patient approves office to leave a detailed voicemail/MyChart message: N\A    Called back and LVMTCB

## 2019-03-26 ENCOUNTER — TELEPHONE (OUTPATIENT)
Dept: MEDICAL GROUP | Facility: PHYSICIAN GROUP | Age: 84
End: 2019-03-26

## 2019-03-26 NOTE — TELEPHONE ENCOUNTER
Future Appointments       Provider Department Center    3/27/2019 10:55 AM Jayce Barron M.D. [facility]        ESTABLISHED PATIENT PRE-VISIT PLANNING     Patient was NOT contacted to complete PVP.       1.  Reviewed notes from the last few office visits within the medical group: Yes    2.  If any orders were placed at last visit or intended to be done for this visit (i.e. 6 mos follow-up), do we have Results/Consult Notes?        •  Labs - Labs ordered, completed on 03/03/2019 and results are in chart.       •  Imaging - Imaging was not ordered at last office visit.       •  Referrals - No referrals were ordered at last office visit.    3. Is this appointment scheduled as a Hospital Follow-Up? No    4.  Immunizations were updated in [place] using WebIZ?: Yes       •  Web Iz Recommendations: FLU, MMR , TDAP and SHINGRIX (Shingles)    5.  Patient is due for the following Health Maintenance Topics:   Health Maintenance Due   Topic Date Due   • IMM HEP B VACCINE (1 of 3 - Risk 3-dose series) 12/25/1951   • IMM DTaP/Tdap/Td Vaccine (1 - Tdap) 12/25/1951   • IMM ZOSTER VACCINES (1 of 2) 12/25/1982   • IMM INFLUENZA (1) 09/01/2018     6. Orders for overdue Health Maintenance topics pended in Pre-Charting? NO    7.  AHA (MDX) form printed for Provider? YES    8.  Patient was NOT informed to arrive 15 min prior to their scheduled appointment and bring in their medication bottles.

## 2019-03-27 ENCOUNTER — HOSPITAL ENCOUNTER (EMERGENCY)
Facility: MEDICAL CENTER | Age: 84
End: 2019-03-27
Attending: EMERGENCY MEDICINE
Payer: MEDICARE

## 2019-03-27 ENCOUNTER — OFFICE VISIT (OUTPATIENT)
Dept: MEDICAL GROUP | Facility: PHYSICIAN GROUP | Age: 84
End: 2019-03-27
Payer: MEDICARE

## 2019-03-27 ENCOUNTER — APPOINTMENT (OUTPATIENT)
Dept: RADIOLOGY | Facility: MEDICAL CENTER | Age: 84
End: 2019-03-27
Attending: EMERGENCY MEDICINE
Payer: MEDICARE

## 2019-03-27 VITALS
HEART RATE: 84 BPM | OXYGEN SATURATION: 91 % | HEIGHT: 63 IN | WEIGHT: 95.02 LBS | DIASTOLIC BLOOD PRESSURE: 92 MMHG | BODY MASS INDEX: 16.84 KG/M2 | TEMPERATURE: 97.9 F | SYSTOLIC BLOOD PRESSURE: 162 MMHG | RESPIRATION RATE: 16 BRPM

## 2019-03-27 VITALS
DIASTOLIC BLOOD PRESSURE: 106 MMHG | SYSTOLIC BLOOD PRESSURE: 160 MMHG | RESPIRATION RATE: 14 BRPM | BODY MASS INDEX: 16.76 KG/M2 | WEIGHT: 94.6 LBS | TEMPERATURE: 98.3 F | HEIGHT: 63 IN | HEART RATE: 82 BPM | OXYGEN SATURATION: 94 %

## 2019-03-27 DIAGNOSIS — Z09 HOSPITAL DISCHARGE FOLLOW-UP: ICD-10-CM

## 2019-03-27 DIAGNOSIS — I71.9 AORTIC ANEURYSM WITHOUT RUPTURE, UNSPECIFIED PORTION OF AORTA (HCC): ICD-10-CM

## 2019-03-27 DIAGNOSIS — I71.40 ABDOMINAL AORTIC ANEURYSM (AAA) WITHOUT RUPTURE (HCC): ICD-10-CM

## 2019-03-27 DIAGNOSIS — K21.9 GASTROESOPHAGEAL REFLUX DISEASE WITHOUT ESOPHAGITIS: ICD-10-CM

## 2019-03-27 DIAGNOSIS — E44.0 MODERATE PROTEIN-CALORIE MALNUTRITION (HCC): ICD-10-CM

## 2019-03-27 DIAGNOSIS — I10 HYPERTENSION, UNSPECIFIED TYPE: ICD-10-CM

## 2019-03-27 DIAGNOSIS — I70.0 ATHEROSCLEROSIS OF AORTA (HCC): ICD-10-CM

## 2019-03-27 DIAGNOSIS — Z23 NEED FOR VACCINATION: ICD-10-CM

## 2019-03-27 DIAGNOSIS — I70.90 ATHEROSCLEROSIS: ICD-10-CM

## 2019-03-27 LAB
ANION GAP SERPL CALC-SCNC: 10 MMOL/L (ref 0–11.9)
BUN SERPL-MCNC: 14 MG/DL (ref 8–22)
CALCIUM SERPL-MCNC: 9.3 MG/DL (ref 8.5–10.5)
CHLORIDE SERPL-SCNC: 104 MMOL/L (ref 96–112)
CO2 SERPL-SCNC: 25 MMOL/L (ref 20–33)
CREAT SERPL-MCNC: 0.79 MG/DL (ref 0.5–1.4)
EKG IMPRESSION: NORMAL
ERYTHROCYTE [DISTWIDTH] IN BLOOD BY AUTOMATED COUNT: 49.9 FL (ref 35.9–50)
GLUCOSE SERPL-MCNC: 80 MG/DL (ref 65–99)
HCT VFR BLD AUTO: 46 % (ref 37–47)
HGB BLD-MCNC: 15.2 G/DL (ref 12–16)
MCH RBC QN AUTO: 31.1 PG (ref 27–33)
MCHC RBC AUTO-ENTMCNC: 33 G/DL (ref 33.6–35)
MCV RBC AUTO: 94.3 FL (ref 81.4–97.8)
PLATELET # BLD AUTO: 156 K/UL (ref 164–446)
PMV BLD AUTO: 10.2 FL (ref 9–12.9)
POTASSIUM SERPL-SCNC: 3.9 MMOL/L (ref 3.6–5.5)
RBC # BLD AUTO: 4.88 M/UL (ref 4.2–5.4)
SODIUM SERPL-SCNC: 139 MMOL/L (ref 135–145)
WBC # BLD AUTO: 6.6 K/UL (ref 4.8–10.8)

## 2019-03-27 PROCEDURE — G0008 ADMIN INFLUENZA VIRUS VAC: HCPCS | Performed by: INTERNAL MEDICINE

## 2019-03-27 PROCEDURE — 8041 PR SCP AHA: Performed by: INTERNAL MEDICINE

## 2019-03-27 PROCEDURE — 90662 IIV NO PRSV INCREASED AG IM: CPT | Performed by: INTERNAL MEDICINE

## 2019-03-27 PROCEDURE — 80048 BASIC METABOLIC PNL TOTAL CA: CPT

## 2019-03-27 PROCEDURE — 93005 ELECTROCARDIOGRAM TRACING: CPT | Performed by: EMERGENCY MEDICINE

## 2019-03-27 PROCEDURE — 99284 EMERGENCY DEPT VISIT MOD MDM: CPT

## 2019-03-27 PROCEDURE — 99214 OFFICE O/P EST MOD 30 MIN: CPT | Mod: 25 | Performed by: INTERNAL MEDICINE

## 2019-03-27 PROCEDURE — 85027 COMPLETE CBC AUTOMATED: CPT

## 2019-03-27 PROCEDURE — 76775 US EXAM ABDO BACK WALL LIM: CPT

## 2019-03-27 RX ORDER — LISINOPRIL 10 MG/1
10 TABLET ORAL DAILY
Qty: 30 TAB | Refills: 1 | Status: SHIPPED | OUTPATIENT
Start: 2019-03-27 | End: 2019-06-21

## 2019-03-27 RX ORDER — PANTOPRAZOLE SODIUM 40 MG/1
40 TABLET, DELAYED RELEASE ORAL DAILY
Qty: 90 TAB | Refills: 0 | Status: SHIPPED | OUTPATIENT
Start: 2019-03-27 | End: 2019-06-18 | Stop reason: SDUPTHER

## 2019-03-27 RX ORDER — ALENDRONATE SODIUM 70 MG/1
70 TABLET ORAL
Refills: 3 | COMMUNITY
Start: 2019-02-25 | End: 2019-11-21 | Stop reason: SDUPTHER

## 2019-03-27 NOTE — ED NOTES
Pt received discharge instructions and understood all including follow up, pt ambulated to lobby with no difficulty

## 2019-03-27 NOTE — ASSESSMENT & PLAN NOTE
Lab Results   Component Value Date/Time    CHOLSTRLTOT 154 03/04/2019 03:55 AM    LDL 80 03/04/2019 03:55 AM    HDL 63 03/04/2019 03:55 AM    TRIGLYCERIDE 56 03/04/2019 03:55 AM     Lipids well controlled, continue atorvastatin 20 mg daily.

## 2019-03-27 NOTE — PROGRESS NOTES
Annual Health Assessment Questions:    1.  Are you currently engaging in any exercise or physical activity? No    2.  How would you describe your mood or emotional well-being today? fair    3.  Have you had any falls in the last year? Yes    4.  Have you noticed any problems with your balance or had difficulty walking? Yes    5.  In the last six months have you experienced any leakage of urine? No    6. DPA/Advanced Directive: Patient does not have an Advanced Directive.  A packet and workshop information was given on Advanced Directives.     PRIMARY CARE CLINIC FOLLOW UP VISIT  Chief Complaint   Patient presents with   • Headache   • Dizziness     History of Present Illness     Hospital discharge follow-up  Jackelin was admitted to the hospital recently for dizziness, headache, and a fall. Neuro imaging was unrevealing other than an old lacunar infarct. She was on aspirin and lipitor prior to her hospital admission. Since being home she feels unsteady on her feet but hasn't fallen. Her blood pressures are high today; denies chest pain, changes in vision/hearing although her baseline hearing is impaired. In the hospital they documented she has 3 drinks a day but she says she has about 2-3 glasses of wine in a week at most.     Protein-calorie malnutrition (HCC)  Tries to drink 2 big glasses of water a day but doesn't much.     Aortic aneurysm without rupture ([facility])  Her aneurysm was last checked 4/2018 and measured 4.5 cm, due for repeat ultrasound.     Gastroesophageal reflux disease without esophagitis  The omeprazole 20 mg hasn't been working well for her, she would like to try pantoprazole.     Atherosclerosis of aorta (HCC)  Lab Results   Component Value Date/Time    CHOLSTRLTOT 154 03/04/2019 03:55 AM    LDL 80 03/04/2019 03:55 AM    HDL 63 03/04/2019 03:55 AM    TRIGLYCERIDE 56 03/04/2019 03:55 AM     Lipids well controlled, continue atorvastatin 20 mg daily.       Current Outpatient Prescriptions   Medication Sig  Dispense Refill   • alendronate (FOSAMAX) 70 MG Tab Take 70 mg by mouth.  3   • pantoprazole (PROTONIX) 40 MG Tablet Delayed Response Take 1 Tab by mouth every day. 90 Tab 0   • budesonide-formoterol (SYMBICORT) 160-4.5 MCG/ACT Aerosol Inhale 2 Puffs by mouth 2 Times a Day.     • atorvastatin (LIPITOR) 40 MG Tab Take 0.5 Tabs by mouth every day. 30 Tab 1   • Nutritional Supplements (VITAMIN D BOOSTER PO) Take 1 Tab by mouth every day.     • gabapentin (NEURONTIN) 100 MG Cap Take 100 mg by mouth every day.     • levothyroxine (SYNTHROID) 112 MCG Tab TAKE 1 TAB BY MOUTH EVERY MORNING ON AN EMPTY STOMACH. 90 Tab 2   • aspirin EC (ECOTRIN) 81 MG Tablet Delayed Response Take 81 mg by mouth every day.       No current facility-administered medications for this visit.      Past Medical History:   Diagnosis Date   • Aortic aneurysm without rupture (HCC)    • Atherosclerosis of aorta (HCC) 3/27/2019   • Cataract     +surgery bilat   • Compression fracture of fourth lumbar vertebra (HCC) 3/28/2018   • Dental disorder 03/20/2018    upper and lower   • Emphysema of lung (HCC)    • Gastroesophageal reflux disease without esophagitis 5/11/2018   • Hiatus hernia syndrome    • Hypothyroidism 2/8/2016   • Mixed hyperlipidemia 12/7/2016   • Protein-calorie malnutrition (HCC) 3/28/2018     Past Surgical History:   Procedure Laterality Date   • ABDOMINAL HYSTERECTOMY TOTAL  1986   • TONSILLECTOMY  1938   • THYROIDECTOMY      1976     Social History   Substance Use Topics   • Smoking status: Current Every Day Smoker     Packs/day: 1.00     Years: 50.00     Types: Cigarettes   • Smokeless tobacco: Never Used   • Alcohol use 0.6 oz/week     1 Glasses of wine per week      Comment: 3 per week     Social History     Social History Narrative    Lives with daughter Lynette      Family History   Problem Relation Age of Onset   • Cancer Mother    • Cancer Father         esophageal   • Stroke Father    • Lung Disease Sister         emphysema  "    Family Status   Relation Status   • Mo    • Fa    • Sis    • Sis    • Sis      Allergies: Patient has no known allergies.    ROS  As per HPI above. All other systems reviewed and negative.        Objective   Blood pressure 160/106, pulse 82, temperature 36.8 °C (98.3 °F), resp. rate 14, height 1.6 m (5' 3\"), weight 42.9 kg (94 lb 9.6 oz), SpO2 94 %, not currently breastfeeding. Body mass index is 16.76 kg/m².    General: alert and oriented, pleasant, cooperative. Appears thin, frail  HEENT: Normocephalic, atraumatic.   Cardiovascular: regular rate and rhythm, normal S1/S2  Pulmonary: lungs clear to auscultation bilaterally  Skin: warm and dry, no lesions or rashes  Psychiatric: appropriate mood and affect. Good insight and appropriate judgment     Assessment and Plan   The following treatment plan was discussed     1. Need for vaccination  - INFLUENZA VACCINE, HIGH DOSE (65+ ONLY)    2. Hospital discharge follow-up  No falls or dizziness since her discharge. She denies having 3 alcoholic drinks a day and says at most she has 2-3 glasses of wine over the span of a week. However, now she has persistently elevated blood pressures of both upper extremities and was sent to the ER for hypertensive emergency rule out. Also more concerning for her hypertension is the fact that she has an abdominal aneurysm.     3. Moderate protein-calorie malnutrition (HCC)  Encouraged to increase protein intake.     4. Aortic aneurysm without rupture, unspecified portion of aorta (HCC)  Due for repeat ultrasound of her aorta, ordered.     5. Gastroesophageal reflux disease without esophagitis  Change from omeprazole 20 mg daily to pantoprazole 40 mg daily.     6. Atherosclerosis of aorta (HCC)  Continue lipitor.     Healthcare maintenance     Health Maintenance Due   Topic Date Due   • IMM HEP B VACCINE (1 of 3 - Risk 3-dose series) 1951   • IMM DTaP/Tdap/Td Vaccine (1 - Tdap) 1951 "   • IMM INFLUENZA (1) 09/01/2018       Return if symptoms worsen or fail to improve.    Jayce Barron MD  Internal Medicine  [facility]

## 2019-03-27 NOTE — ED TRIAGE NOTES
".  Chief Complaint   Patient presents with   • Sent by MD     PCP   • Hypertension     174/106     .BP (!) 174/106   Pulse 81   Temp 36.6 °C (97.9 °F) (Temporal)   Resp 14   Ht 1.6 m (5' 3\")   Wt 43.1 kg (95 lb 0.3 oz)   LMP 03/03/1970 (Approximate)   SpO2 91%   BMI 16.83 kg/m²     Ambulatory to triage for above, no hx of HTN, patient denies pain, hx of aortic aneurysm, educated on triage process, placed in lobby, told to inform staff of any changes in condition.    "

## 2019-03-27 NOTE — ASSESSMENT & PLAN NOTE
Jackelin was admitted to the hospital recently for dizziness, headache, and a fall. Neuro imaging was unrevealing other than an old lacunar infarct. She was on aspirin and lipitor prior to her hospital admission. Since being home she feels unsteady on her feet but hasn't fallen. Her blood pressures are high today; denies chest pain, changes in vision/hearing although her baseline hearing is impaired. In the hospital they documented she has 3 drinks a day but she says she has about 2-3 glasses of wine in a week at most.

## 2019-03-27 NOTE — ED PROVIDER NOTES
ED Provider Note    ED Provider Note    Primary care provider: Jayce Barron M.D.  Means of arrival: Walk-in  History obtained from: Patient    CHIEF COMPLAINT  Chief Complaint   Patient presents with   • Sent by MD     PCP   • Hypertension     174/106     Seen at 2:05 PM.   HPI  Mary Murrieta is a 86 y.o. female who presents to the Emergency Department with asymptomatic hypertension.  She went to her primary care physician today for follow-up, the PCP noted the blood pressure significantly elevated and was concerned given the prior history of an abdominal aneurysm.  The patient does note some chronic back pain, this is unchanged for the past year and associated to a herniated disc she has had in the past.  She denies any abdominal pain, lightheadedness, headache, chest pain, shortness of breath, numbness or weakness.  She does not note any prior history of elevated blood pressures.  She denies any claudication of the lower extremities.  She is able to ambulate without any tingling, pain or weakness.      REVIEW OF SYSTEMS  See HPI,   Remainder of ROS negative.     PAST MEDICAL HISTORY   has a past medical history of Aortic aneurysm without rupture (HCC); Atherosclerosis of aorta (HCC) (3/27/2019); Cataract; Compression fracture of fourth lumbar vertebra (HCC) (3/28/2018); Dental disorder (03/20/2018); Emphysema of lung (HCC); Gastroesophageal reflux disease without esophagitis (5/11/2018); Hiatus hernia syndrome; Hypothyroidism (2/8/2016); Mixed hyperlipidemia (12/7/2016); and Protein-calorie malnutrition (HCC) (3/28/2018).    SURGICAL HISTORY   has a past surgical history that includes tonsillectomy (1938); thyroidectomy; and abdominal hysterectomy total (1986).    SOCIAL HISTORY  Social History   Substance Use Topics   • Smoking status: Current Every Day Smoker     Packs/day: 1.00     Years: 50.00     Types: Cigarettes   • Smokeless tobacco: Never Used   • Alcohol use 0.6 oz/week     1 Glasses of wine per week  "     Comment: 3 per week      History   Drug Use No       FAMILY HISTORY  Family History   Problem Relation Age of Onset   • Cancer Mother    • Cancer Father         esophageal   • Stroke Father    • Lung Disease Sister         emphysema       CURRENT MEDICATIONS  Reviewed.  See Encounter Summary.     ALLERGIES  No Known Allergies    PHYSICAL EXAM  VITAL SIGNS: BP (!) 162/92   Pulse 84   Temp 36.6 °C (97.9 °F) (Temporal)   Resp 16   Ht 1.6 m (5' 3\")   Wt 43.1 kg (95 lb 0.3 oz)   LMP 03/03/1970 (Approximate)   SpO2 91%   BMI 16.83 kg/m²   Constitutional: Awake, alert in no apparent distress.  HENT: Normocephalic, Bilateral external ears normal. Nose normal.   Eyes: Conjunctiva normal, non-icteric, EOMI.    Thorax & Lungs: Easy unlabored respirations, Clear to ascultation bilaterally.  Cardiovascular: Regular rate, Regular rhythm, No murmurs, rubs or gallops.  Extremities are warm.  Sensation intact light touch distally.  The dorsalis pedis is not palpable bilaterally but the patient does have brisk capillary refill.  Abdomen:  Soft, nontender, nondistended, normal active bowel sounds.  No masses.  :    Skin: Visualized skin is  Dry, No erythema, No rash.   Musculoskeletal:   No cyanosis, clubbing or edema.  Neurologic: Alert, Grossly non-focal.   Psychiatric: Normal affect, Normal mood  Lymphatic:  No cervical LAD    EKG   12 lead Interpreted by me  Rhythm:  Normal sinus rhythm   Rate: 73  Axis: normal  Ectopy: none  Conduction: normal  ST Segments: Slight depressions in precordial leads, unchanged from prior.    T Waves: no acute change  Clinical Impression: Nonspecific ST segments, unchanged compared to baseline.    RADIOLOGY  US-ABDOMINAL AORTA W/O DOPPLER   Final Result      Mid abdominal aortic aneurysm measures up to 5.3 cm as compared to 4.5 cm previously.   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            COURSE & MEDICAL DECISION MAKING  Pertinent Labs & Imaging " studies reviewed. (See chart for details)    Differential diagnoses include but are not limited to: Asymptomatic hypertension, enlarging aneurysm    2:05 PM - Medical record reviewed, patient seen and examined at bedside.  I reviewed the prior medical record, the patient does have a 4.5 cm aneurysm, last measured in April 2018.  She also has a borderline hypertension, most blood pressures range in the 130s systolic.  She did have one ER visit with a blood pressure in the 180s.    4:17 PM -reviewed the test results with the patient.    Decision Making:  This is a pleasant well-appearing 86 y.o. year old female who presents with asymptomatic hypertension.  She was referred here because her primary care physician is concerned about the elevated blood pressure and underlying aneurysm.  She is otherwise without any complaints aside from her chronic back pain.  Given that she does have some back pain I did repeat the ultrasound today to make sure that there is not any worsening aneurysm.  The size of the aneurysm has increased from 4.8-5.3 cm.  She did report seeing some physician that in the past told her that the aneurysm was inoperable due to her underlying medical conditions.  On the ultrasound today there is some question of a iliac occlusion.  The patient does not have any signs of acute limb ischemia and she does not have any history of claudication, therefore it appears to be nonemergent and I suspect she has good collateral flow.  I cannot easily palpate pulses, not surprising given her degree of atherosclerosis.  However she has brisk capillary refill, warm extremities and no tenderness, therefore I do not suspect ischemic limb.    The patient is on aspirin therapy which I strongly encouraged her to continue.  Her blood pressure improved significantly today in the ER without any treatment.  I reviewed the medical record, the patient's blood pressure could stand to be improved, will put her on a low-dose  lisinopril.    I explained all the test results to the patient.  Ideally she should stop smoking though at 86 she is quite well-appearing and not interested in smoking cessation which I understand given her advanced age.  She plans to visit her family in [facility].  I explained that she should follow-up with vascular surgery when she returns.  As the aneurysm is less than 5.5 cm and she has significant comorbidities it is unlikely that she will be amenable to surgical repair.      Discharge Medications:  Discharge Medication List as of 3/27/2019  4:31 PM      START taking these medications    Details   lisinopril (PRINIVIL) 10 MG Tab Take 1 Tab by mouth every day., Disp-30 Tab, R-1, Normal             The patient was discharged home (see d/c instructions) and parent was told to return immediately for any signs or symptoms listed, or any worsening at all.  The patient's parent verbally agreed to the discharge precautions and follow-up plan which is documented in EPIC.        FINAL IMPRESSION  1. Hypertension, unspecified type    2. Abdominal aortic aneurysm (AAA) without rupture (HCC)    3. Atherosclerosis

## 2019-04-08 ENCOUNTER — TELEPHONE (OUTPATIENT)
Dept: MEDICAL GROUP | Facility: PHYSICIAN GROUP | Age: 84
End: 2019-04-08

## 2019-04-08 ENCOUNTER — HOSPITAL ENCOUNTER (OUTPATIENT)
Dept: RADIOLOGY | Facility: MEDICAL CENTER | Age: 84
End: 2019-04-08
Attending: INTERNAL MEDICINE
Payer: MEDICARE

## 2019-04-08 DIAGNOSIS — I71.40 ABDOMINAL ANEURYSM (HCC): ICD-10-CM

## 2019-04-08 DIAGNOSIS — I71.9 AORTIC ANEURYSM WITHOUT RUPTURE, UNSPECIFIED PORTION OF AORTA (HCC): ICD-10-CM

## 2019-04-08 PROCEDURE — 76775 US EXAM ABDO BACK WALL LIM: CPT

## 2019-04-08 NOTE — TELEPHONE ENCOUNTER
Phone Number Called: [phone] (home)     Message: Pt notified on results and referral, she will wait for the phone call.     Left Message for patient to call back: no

## 2019-04-08 NOTE — TELEPHONE ENCOUNTER
----- Message from Jayce Barron M.D. sent at 4/8/2019  3:48 PM PDT -----  Please let her know that her aneurysm has grown from 4.5 cm to 5.3 cm. I have referred her to vascular surgery

## 2019-04-16 ENCOUNTER — PATIENT OUTREACH (OUTPATIENT)
Dept: HEALTH INFORMATION MANAGEMENT | Facility: OTHER | Age: 84
End: 2019-04-16

## 2019-04-16 NOTE — PROGRESS NOTES
Mary Murrieta was admitted to [facility] on 3/3/19 for dizziness. Patient discharged home on 3/4/19. [facility] patient advocate was able to successfully engage with patient post-discharge, however, had a difficult time reaching the patient on a regular basis. Per discharge orders, patient was instructed to see her PCP. Patient had a follow-up on 3/27/19. PPS score was conducted.

## 2019-04-24 RX ORDER — BUDESONIDE AND FORMOTEROL FUMARATE DIHYDRATE 160; 4.5 UG/1; UG/1
2 AEROSOL RESPIRATORY (INHALATION) 2 TIMES DAILY
Qty: 30.6 INHALER | Refills: 0 | Status: SHIPPED | OUTPATIENT
Start: 2019-04-24 | End: 2019-04-27

## 2019-04-24 NOTE — TELEPHONE ENCOUNTER
Was the patient seen in the last year in this department? Yes    Does patient have an active prescription for medications requested? No     Received Request Via: Pharmacy      Pt met protocol?: Yes   Pt last ov 3/19

## 2019-05-03 ENCOUNTER — TELEPHONE (OUTPATIENT)
Dept: MEDICAL GROUP | Facility: PHYSICIAN GROUP | Age: 84
End: 2019-05-03

## 2019-05-03 NOTE — TELEPHONE ENCOUNTER
Future Appointments       Provider Department Center    5/6/2019 2:35 PM Jayce Barron M.D. [facility]            ESTABLISHED PATIENT PRE-VISIT PLANNING     Patient was contacted to complete PVP.    1.  Reviewed notes from the last few office visits within the medical group: Yes    2.  If any orders were placed at last visit or intended to be done for this visit (i.e. 6 mos follow-up), do we have Results/Consult Notes?        •  Labs - Labs were not ordered at last office visit.       •  Imaging - Imaging ordered, completed and results are in chart.       •  Referrals - Referral ordered, patient has NOT been seen.    3. Is this appointment scheduled as a Hospital Follow-Up? No    4.  Immunizations were updated in Epic using WebIZ?: Yes       •  Web Iz Recommendations: MMR     5.  Patient is due for the following Health Maintenance Topics:   Health Maintenance Due   Topic Date Due   • IMM DTaP/Tdap/Td Vaccine (1 - Tdap) 12/25/1951     6. Orders for overdue Health Maintenance topics pended in Pre-Charting? NO    7.  AHA (MDX) form printed for Provider? No, already completed    8.  Patient was informed to arrive 15 min prior to their scheduled appointment and bring in their medication bottles.

## 2019-05-06 ENCOUNTER — OFFICE VISIT (OUTPATIENT)
Dept: MEDICAL GROUP | Facility: PHYSICIAN GROUP | Age: 84
End: 2019-05-06
Payer: MEDICARE

## 2019-05-06 VITALS
HEIGHT: 63 IN | DIASTOLIC BLOOD PRESSURE: 64 MMHG | TEMPERATURE: 96.8 F | SYSTOLIC BLOOD PRESSURE: 108 MMHG | RESPIRATION RATE: 16 BRPM | HEART RATE: 85 BPM | OXYGEN SATURATION: 93 % | BODY MASS INDEX: 16.2 KG/M2 | WEIGHT: 91.4 LBS

## 2019-05-06 DIAGNOSIS — R06.09 DYSPNEA ON EXERTION: ICD-10-CM

## 2019-05-06 DIAGNOSIS — S32.040S CLOSED COMPRESSION FRACTURE OF FOURTH LUMBAR VERTEBRA, SEQUELA: ICD-10-CM

## 2019-05-06 PROCEDURE — 99214 OFFICE O/P EST MOD 30 MIN: CPT | Performed by: INTERNAL MEDICINE

## 2019-05-06 RX ORDER — ALBUTEROL SULFATE 90 UG/1
2 AEROSOL, METERED RESPIRATORY (INHALATION) EVERY 6 HOURS PRN
Qty: 8.5 G | Refills: 3 | Status: SHIPPED | OUTPATIENT
Start: 2019-05-06 | End: 2020-03-02 | Stop reason: SDUPTHER

## 2019-05-06 NOTE — ASSESSMENT & PLAN NOTE
Mary used to be able to make it to her mailbox but for the past 4 months or so she's been so short of breath that she can hardly go from room to room. Denies chest pain. Compliant with symbicort bid. Just returned from [place] on 4/30/2019 after a 3 week trip but her dyspnea on exertion was as bad as it is now before her trip.

## 2019-05-06 NOTE — PROGRESS NOTES
PRIMARY CARE CLINIC FOLLOW UP VISIT  Chief Complaint   Patient presents with   • Shortness of Breath     Req PRN inhaler      History of Present Illness     Dyspnea on exertion  Mary used to be able to make it to her mailbox but for the past 4 months or so she's been so short of breath that she can hardly go from room to room. Denies chest pain. Compliant with symbicort bid. Just returned from [place] on 4/30/2019 after a 3 week trip but her dyspnea on exertion was as bad as it is now before her trip.     Compression fracture of fourth lumbar vertebra (CMS-HCC) (HCC)  Continues to have severe back pain. Had a kyphoplasty of her L4 3/2018 with [facility].     Current Outpatient Prescriptions   Medication Sig Dispense Refill   • albuterol 108 (90 Base) MCG/ACT Aero Soln inhalation aerosol Inhale 2 Puffs by mouth every 6 hours as needed for Shortness of Breath. 8.5 g 3   • alendronate (FOSAMAX) 70 MG Tab Take 70 mg by mouth.  3   • pantoprazole (PROTONIX) 40 MG Tablet Delayed Response Take 1 Tab by mouth every day. 90 Tab 0   • lisinopril (PRINIVIL) 10 MG Tab Take 1 Tab by mouth every day. 30 Tab 1   • budesonide-formoterol (SYMBICORT) 160-4.5 MCG/ACT Aerosol Inhale 2 Puffs by mouth 2 Times a Day.     • atorvastatin (LIPITOR) 40 MG Tab Take 0.5 Tabs by mouth every day. 30 Tab 1   • Nutritional Supplements (VITAMIN D BOOSTER PO) Take 1 Tab by mouth every day.     • gabapentin (NEURONTIN) 100 MG Cap Take 100 mg by mouth every day.     • levothyroxine (SYNTHROID) 112 MCG Tab TAKE 1 TAB BY MOUTH EVERY MORNING ON AN EMPTY STOMACH. 90 Tab 2   • aspirin EC (ECOTRIN) 81 MG Tablet Delayed Response Take 81 mg by mouth every day.       No current facility-administered medications for this visit.      Past Medical History:   Diagnosis Date   • Aortic aneurysm without rupture (HCC)    • Atherosclerosis of aorta (HCC) 3/27/2019   • Cataract     +surgery bilat   • Compression fracture of fourth lumbar vertebra (HCC) 3/28/2018   •  "Dental disorder 2018    upper and lower   • Emphysema of lung (HCC)    • Gastroesophageal reflux disease without esophagitis 2018   • Hiatus hernia syndrome    • Hypothyroidism 2016   • Mixed hyperlipidemia 2016   • Protein-calorie malnutrition (HCC) 3/28/2018     Past Surgical History:   Procedure Laterality Date   • ABDOMINAL HYSTERECTOMY TOTAL     • TONSILLECTOMY  193   • THYROIDECTOMY           Social History   Substance Use Topics   • Smoking status: Current Every Day Smoker     Packs/day: 1.00     Years: 50.00     Types: Cigarettes   • Smokeless tobacco: Never Used   • Alcohol use 0.6 oz/week     1 Glasses of wine per week      Comment: 3 per week     Social History     Social History Narrative    Lives with daughter Lynette      Family History   Problem Relation Age of Onset   • Cancer Mother    • Cancer Father         esophageal   • Stroke Father    • Lung Disease Sister         emphysema     Family Status   Relation Status   • Mo    • Fa    • Sis    • Sis    • Sis      Allergies: Patient has no known allergies.    ROS  As per HPI above. All other systems reviewed and negative.        Objective   /64 (BP Cuff Size: Small adult)   Pulse 85   Temp 36 °C (96.8 °F)   Resp 16   Ht 1.6 m (5' 3\")   Wt 41.5 kg (91 lb 6.4 oz)   SpO2 93%  Body mass index is 16.19 kg/m².    General: alert and oriented, pleasant, cooperative  HEENT: Normocephalic, atraumatic.   Cardiovascular: regular rate and rhythm, normal S1/S2  Pulmonary: lungs clear to auscultation bilaterally  Skin: warm and dry, no lesions or rashes  Psychiatric: appropriate mood and affect. Good insight and appropriate judgment       Assessment and Plan   The following treatment plan was discussed     1. Dyspnea on exertion  Likely has severe COPD, needs PFTs to establish a baseline. Also consider 6 minute walk test. Encouraged smoking cessation.   - PULMONARY FUNCTION TESTS -Test " requested: Complete Pulmonary Function Test; Future  - albuterol 108 (90 Base) MCG/ACT Aero Soln inhalation aerosol; Inhale 2 Puffs by mouth every 6 hours as needed for Shortness of Breath.  Dispense: 8.5 g; Refill: 3    2. Closed compression fracture of fourth lumbar vertebra, sequela  Advised otc alternating regimen of tylenol and ibuprofen and to follow up with [facility] where her kyphoplasty was done.     Healthcare maintenance     Health Maintenance Due   Topic Date Due   • IMM DTaP/Tdap/Td Vaccine (1 - Tdap) 12/25/1951     No Follow-up on file.    Jayce Barron MD  Internal Medicine  [facility]

## 2019-05-16 ENCOUNTER — NON-PROVIDER VISIT (OUTPATIENT)
Dept: PULMONOLOGY | Facility: HOSPICE | Age: 84
End: 2019-05-16
Attending: INTERNAL MEDICINE
Payer: MEDICARE

## 2019-05-16 VITALS — BODY MASS INDEX: 16.28 KG/M2 | WEIGHT: 88.5 LBS | HEIGHT: 62 IN

## 2019-05-16 DIAGNOSIS — R06.09 DYSPNEA ON EXERTION: ICD-10-CM

## 2019-05-16 PROCEDURE — 94726 PLETHYSMOGRAPHY LUNG VOLUMES: CPT | Performed by: INTERNAL MEDICINE

## 2019-05-16 PROCEDURE — 94060 EVALUATION OF WHEEZING: CPT | Performed by: INTERNAL MEDICINE

## 2019-05-16 PROCEDURE — 94729 DIFFUSING CAPACITY: CPT | Performed by: INTERNAL MEDICINE

## 2019-05-16 ASSESSMENT — PULMONARY FUNCTION TESTS
FEV1/FVC_PERCENT_PREDICTED: 63
FEV1_PERCENT_CHANGE: 17
FEV1_PREDICTED: 1.52
FVC: 2.01
FEV1/FVC_PERCENT_PREDICTED: 73
FEV1/FVC_PREDICTED: 72
FEV1_PERCENT_PREDICTED: 60
FVC_PREDICTED: 2.08
FVC_PERCENT_PREDICTED: 96
FEV1: .92
FVC_PERCENT_PREDICTED: 113
FEV1_PERCENT_CHANGE: 14
FEV1/FVC: 45
FEV1/FVC_PERCENT_PREDICTED: 63
FEV1/FVC_PERCENT_PREDICTED: 61
FEV1/FVC: 45.11
FEV1: 1.06
FEV1/FVC_PERCENT_PREDICTED: 62
FEV1_LLN: 1.27
FEV1_PERCENT_PREDICTED: 69
FEV1/FVC: 46
FEV1/FVC_PERCENT_CHANGE: -2
FEV1/FVC: 46
FEV1/FVC_PERCENT_LLN: 60
FEV1/FVC_PERCENT_CHANGE: 82
FVC: 2.35
FVC_LLN: 1.74

## 2019-05-16 NOTE — PROCEDURES
Technician: Marina Maxwell RRT   Good patient effort & cooperation.  The results of this test meet the ATS/ERS standards for acceptability & reproducibility.  Test was performed on the G[employer] Body Plethysmograph-Elite DX system.  Predicted values were [facility]-3 for spirometry, [facility] for DLCO, ITS for Lung Volumes.  The DLCO was uncorrected for Hgb.  A bronchodilator of Ventolin HFA -2puffs via spacer administered.  DLCO performed during dilation period.    Interpretation;     Acceptable and reproducible  FEV1 0.92 L [60%], FVC 2.01 L] 96%], ratio 46%  Flow volume loops consistent with obstruction  Total lung capacity 6.72 L [141%]  DLCO 7.85 mL's per minute millimeter mercury [49%]  Impression  Moderately severe obstruction with response to bronchodilator based on FVC.  Evidence of air trapping hyperinflation and moderate reduction in gas transfer.

## 2019-05-17 ENCOUNTER — TELEPHONE (OUTPATIENT)
Dept: MEDICAL GROUP | Facility: PHYSICIAN GROUP | Age: 84
End: 2019-05-17

## 2019-05-17 NOTE — TELEPHONE ENCOUNTER
----- Message from Jayce Barron M.D. sent at 5/17/2019  9:21 AM PDT -----  Lung test shows COPD, needs f/u with me to discuss further

## 2019-05-20 ENCOUNTER — TELEPHONE (OUTPATIENT)
Dept: MEDICAL GROUP | Facility: PHYSICIAN GROUP | Age: 84
End: 2019-05-20

## 2019-05-24 ENCOUNTER — TELEPHONE (OUTPATIENT)
Dept: MEDICAL GROUP | Facility: PHYSICIAN GROUP | Age: 84
End: 2019-05-24

## 2019-05-24 NOTE — TELEPHONE ENCOUNTER
Future Appointments       Provider Department Center    5/28/2019 10:35 AM Jayce Barron M.D. [facility]    6/18/2019 9:00 AM [address] [facility] IMAGING - CT - [address]        ESTABLISHED PATIENT PRE-VISIT PLANNING     Patient was NOT contacted to complete PVP.      1.  Reviewed notes from the last few office visits within the medical group: Yes    2.  If any orders were placed at last visit or intended to be done for this visit (i.e. 6 mos follow-up), do we have Results/Consult Notes?        •  Labs - Labs ordered, completed on 03/27/2019 and results are in chart.       •  Imaging - Imaging was not ordered at last office visit.       •  Referrals - No referrals were ordered at last office visit.    3. Is this appointment scheduled as a Hospital Follow-Up? No    4.  Immunizations were updated in [employer] using WebIZ?: Yes       •  Web Iz Recommendations: MMR , TDAP and SHINGRIX (Shingles)    5.  Patient is due for the following Health Maintenance Topics:   Health Maintenance Due   Topic Date Due   • IMM DTaP/Tdap/Td Vaccine (1 - Tdap) 12/25/1951   • Annual Wellness Visit  05/12/2019     6. Orders for overdue Health Maintenance topics pended in Pre-Charting? NO    7.  AHA (MDX) form printed for Provider? No, already completed    8.  Patient was NOT informed to arrive 15 min prior to their scheduled appointment and bring in their medication bottles.

## 2019-05-28 ENCOUNTER — OFFICE VISIT (OUTPATIENT)
Dept: MEDICAL GROUP | Facility: PHYSICIAN GROUP | Age: 84
End: 2019-05-28
Payer: MEDICARE

## 2019-05-28 VITALS
DIASTOLIC BLOOD PRESSURE: 60 MMHG | HEART RATE: 81 BPM | HEIGHT: 62 IN | RESPIRATION RATE: 16 BRPM | BODY MASS INDEX: 16.3 KG/M2 | OXYGEN SATURATION: 93 % | TEMPERATURE: 98 F | SYSTOLIC BLOOD PRESSURE: 106 MMHG | WEIGHT: 88.6 LBS

## 2019-05-28 DIAGNOSIS — J44.9 CHRONIC OBSTRUCTIVE PULMONARY DISEASE, UNSPECIFIED COPD TYPE (HCC): ICD-10-CM

## 2019-05-28 DIAGNOSIS — E03.9 HYPOTHYROIDISM, UNSPECIFIED TYPE: ICD-10-CM

## 2019-05-28 DIAGNOSIS — S32.040S CLOSED COMPRESSION FRACTURE OF FOURTH LUMBAR VERTEBRA, SEQUELA: ICD-10-CM

## 2019-05-28 DIAGNOSIS — F17.200 CURRENT SMOKER: ICD-10-CM

## 2019-05-28 PROCEDURE — 99214 OFFICE O/P EST MOD 30 MIN: CPT | Performed by: INTERNAL MEDICINE

## 2019-05-28 RX ORDER — GABAPENTIN 100 MG/1
100 CAPSULE ORAL DAILY
Qty: 90 CAP | Refills: 3 | Status: SHIPPED | OUTPATIENT
Start: 2019-05-28 | End: 2019-11-21

## 2019-05-28 RX ORDER — TIOTROPIUM BROMIDE 18 UG/1
18 CAPSULE ORAL; RESPIRATORY (INHALATION) DAILY
Qty: 30 CAP | Refills: 3 | Status: SHIPPED | OUTPATIENT
Start: 2019-05-28 | End: 2019-09-19 | Stop reason: SDUPTHER

## 2019-05-28 RX ORDER — LEVOTHYROXINE SODIUM 112 UG/1
112 TABLET ORAL
Qty: 90 TAB | Refills: 3 | Status: SHIPPED | OUTPATIENT
Start: 2019-05-28 | End: 2019-11-21 | Stop reason: SDUPTHER

## 2019-05-28 NOTE — ASSESSMENT & PLAN NOTE
Here for follow up from her recent PFTs, which show moderate to severe COPD. She is already on symbicort, sometimes she takes this up to three times a day. Continues to have severe dyspnea on exertion. Continues to smoke.

## 2019-05-28 NOTE — PROGRESS NOTES
PRIMARY CARE CLINIC FOLLOW UP VISIT  Chief Complaint   Patient presents with   • COPD     Lung testing follow up      History of Present Illness     COPD (chronic obstructive pulmonary disease) ([facility])  Here for follow up from her recent PFTs, which show moderate to severe COPD. She is already on symbicort, sometimes she takes this up to three times a day. Continues to have severe dyspnea on exertion.     Current Outpatient Prescriptions   Medication Sig Dispense Refill   • Fluticasone-Umeclidin-Vilant 100-62.5-25 MCG/INH AEROSOL POWDER, BREATH ACTIVATED Inhale 1 Puff by mouth every day. 1 Each 3   • levothyroxine (SYNTHROID) 112 MCG Tab Take 1 Tab by mouth Every morning on an empty stomach. 90 Tab 3   • gabapentin (NEURONTIN) 100 MG Cap Take 1 Cap by mouth every day. 90 Cap 3   • tiotropium (SPIRIVA) 18 MCG Cap Inhale 1 Cap by mouth every day. 30 Cap 3   • albuterol 108 (90 Base) MCG/ACT Aero Soln inhalation aerosol Inhale 2 Puffs by mouth every 6 hours as needed for Shortness of Breath. 8.5 g 3   • alendronate (FOSAMAX) 70 MG Tab Take 70 mg by mouth.  3   • pantoprazole (PROTONIX) 40 MG Tablet Delayed Response Take 1 Tab by mouth every day. 90 Tab 0   • lisinopril (PRINIVIL) 10 MG Tab Take 1 Tab by mouth every day. 30 Tab 1   • atorvastatin (LIPITOR) 40 MG Tab Take 0.5 Tabs by mouth every day. 30 Tab 1   • Nutritional Supplements (VITAMIN D BOOSTER PO) Take 1 Tab by mouth every day.     • aspirin EC (ECOTRIN) 81 MG Tablet Delayed Response Take 81 mg by mouth every day.       No current facility-administered medications for this visit.      Past Medical History:   Diagnosis Date   • Aortic aneurysm without rupture ([facility])    • Atherosclerosis of aorta ([facility]) 3/27/2019   • Cataract     +surgery bilat   • Compression fracture of fourth lumbar vertebra ([facility]) 3/28/2018   • Dental disorder 03/20/2018    upper and lower   • Emphysema of lung ([facility])    • Gastroesophageal reflux disease without esophagitis 5/11/2018   • Hiatus  "hernia syndrome    • Hypothyroidism 2016   • Mixed hyperlipidemia 2016   • Protein-calorie malnutrition (HCC) 3/28/2018     Past Surgical History:   Procedure Laterality Date   • ABDOMINAL HYSTERECTOMY TOTAL     • TONSILLECTOMY     • THYROIDECTOMY           Social History   Substance Use Topics   • Smoking status: Current Every Day Smoker     Packs/day: 1.00     Years: 50.00     Types: Cigarettes   • Smokeless tobacco: Never Used   • Alcohol use 0.6 oz/week     1 Glasses of wine per week      Comment: 3 per week     Social History     Social History Narrative    Lives with daughter Lynette      Family History   Problem Relation Age of Onset   • Cancer Mother    • Cancer Father         esophageal   • Stroke Father    • Lung Disease Sister         emphysema     Family Status   Relation Status   • Mo    • Fa    • Sis    • Sis    • Sis      Allergies: Patient has no known allergies.    ROS  As per HPI above. All other systems reviewed and negative.        Objective   /60 (BP Cuff Size: Small adult)   Pulse 81   Temp 36.7 °C (98 °F)   Resp 16   Ht 1.575 m (5' 2\")   Wt 40.2 kg (88 lb 9.6 oz)   SpO2 93%  Body mass index is 16.21 kg/m².    General: alert and oriented, pleasant, cooperative. Elderly, frail   HEENT: Normocephalic, atraumatic.   Psychiatric: appropriate mood and affect. Good insight and appropriate judgment     Assessment and Plan   The following treatment plan was discussed     1. Chronic obstructive pulmonary disease, unspecified COPD type (HCC)  Trial of trelegy in lieu of symbicort, also add spiriva. 6 minute walk test demonstrated desaturation to 87% after 1 minute but she maintained at 91% for 3 minutes, was only able to complete 3 minutes and 30 seconds of the test. We discussed the importance of smoking cessation and she was open to the tobacco cessation program.   - Fluticasone-Umeclidin-Vilant 100-62.5-25 MCG/INH AEROSOL POWDER, " BREATH ACTIVATED; Inhale 1 Puff by mouth every day.  Dispense: 1 Each; Refill: 3  - tiotropium (SPIRIVA) 18 MCG Cap; Inhale 1 Cap by mouth every day.  Dispense: 30 Cap; Refill: 3    2. Hypothyroidism, unspecified type  - levothyroxine (SYNTHROID) 112 MCG Tab; Take 1 Tab by mouth Every morning on an empty stomach.  Dispense: 90 Tab; Refill: 3    3. Closed compression fracture of fourth lumbar vertebra, sequela  - gabapentin (NEURONTIN) 100 MG Cap; Take 1 Cap by mouth every day.  Dispense: 90 Cap; Refill: 3    Healthcare maintenance     Health Maintenance Due   Topic Date Due   • IMM DTaP/Tdap/Td Vaccine (1 - Tdap) 12/25/1951   • Annual Wellness Visit  05/12/2019     No Follow-up on file.    Jayce Barron MD  Internal Medicine  [facility]

## 2019-06-04 ENCOUNTER — HOSPITAL ENCOUNTER (OUTPATIENT)
Dept: LAB | Facility: MEDICAL CENTER | Age: 84
End: 2019-06-04
Attending: SURGERY
Payer: MEDICARE

## 2019-06-04 LAB
BUN SERPL-MCNC: 16 MG/DL (ref 8–22)
CREAT SERPL-MCNC: 0.95 MG/DL (ref 0.5–1.4)

## 2019-06-04 PROCEDURE — 36415 COLL VENOUS BLD VENIPUNCTURE: CPT

## 2019-06-04 PROCEDURE — 82565 ASSAY OF CREATININE: CPT

## 2019-06-04 PROCEDURE — 84520 ASSAY OF UREA NITROGEN: CPT

## 2019-06-18 ENCOUNTER — HOSPITAL ENCOUNTER (OUTPATIENT)
Dept: RADIOLOGY | Facility: MEDICAL CENTER | Age: 84
End: 2019-06-18
Attending: SURGERY
Payer: MEDICARE

## 2019-06-18 DIAGNOSIS — I71.40 ABDOMINAL AORTIC ANEURYSM WITHOUT RUPTURE (HCC): ICD-10-CM

## 2019-06-18 PROCEDURE — 71275 CT ANGIOGRAPHY CHEST: CPT

## 2019-06-18 PROCEDURE — 700117 HCHG RX CONTRAST REV CODE 255: Performed by: SURGERY

## 2019-06-18 RX ADMIN — IOHEXOL 100 ML: 350 INJECTION, SOLUTION INTRAVENOUS at 09:20

## 2019-06-19 RX ORDER — PANTOPRAZOLE SODIUM 40 MG/1
TABLET, DELAYED RELEASE ORAL
Qty: 90 TAB | Refills: 1 | Status: SHIPPED | OUTPATIENT
Start: 2019-06-19 | End: 2019-11-21 | Stop reason: SDUPTHER

## 2019-06-19 NOTE — TELEPHONE ENCOUNTER
Was the patient seen in the last year in this department? Yes    Does patient have an active prescription for medications requested? No     Received Request Via: Pharmacy      Pt met protocol?: Yes   Pt last ov 5/19   Lab Results   Component Value Date/Time    SODIUM 139 03/27/2019 02:20 PM    POTASSIUM 3.9 03/27/2019 02:20 PM    CHLORIDE 104 03/27/2019 02:20 PM    CO2 25 03/27/2019 02:20 PM    GLUCOSE 80 03/27/2019 02:20 PM    BUN 16 06/04/2019 11:28 AM    CREATININE 0.95 06/04/2019 11:28 AM

## 2019-06-20 NOTE — TELEPHONE ENCOUNTER
Was the patient seen in the last year in this department? Yes    Does patient have an active prescription for medications requested? No     Received Request Via: Patient     Pt would like a 90 day supply instead of a 30 day supply because it is cheaper for her

## 2019-06-21 ENCOUNTER — OFFICE VISIT (OUTPATIENT)
Dept: MEDICAL GROUP | Facility: PHYSICIAN GROUP | Age: 84
End: 2019-06-21
Payer: MEDICARE

## 2019-06-21 ENCOUNTER — HOSPITAL ENCOUNTER (OUTPATIENT)
Dept: RADIOLOGY | Facility: MEDICAL CENTER | Age: 84
End: 2019-06-21
Attending: INTERNAL MEDICINE
Payer: MEDICARE

## 2019-06-21 ENCOUNTER — TELEPHONE (OUTPATIENT)
Dept: MEDICAL GROUP | Facility: PHYSICIAN GROUP | Age: 84
End: 2019-06-21

## 2019-06-21 VITALS
DIASTOLIC BLOOD PRESSURE: 72 MMHG | TEMPERATURE: 98.8 F | SYSTOLIC BLOOD PRESSURE: 108 MMHG | OXYGEN SATURATION: 94 % | HEIGHT: 62 IN | WEIGHT: 90.8 LBS | BODY MASS INDEX: 16.71 KG/M2 | RESPIRATION RATE: 18 BRPM | HEART RATE: 83 BPM

## 2019-06-21 DIAGNOSIS — J44.9 CHRONIC OBSTRUCTIVE PULMONARY DISEASE, UNSPECIFIED COPD TYPE (HCC): ICD-10-CM
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
## 2019-06-21 DIAGNOSIS — I71.9 AORTIC ANEURYSM WITHOUT RUPTURE, UNSPECIFIED PORTION OF AORTA (HCC): ICD-10-CM

## 2019-06-21 DIAGNOSIS — I10 ESSENTIAL HYPERTENSION: ICD-10-CM
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
## 2019-06-21 PROCEDURE — 72220 X-RAY EXAM SACRUM TAILBONE: CPT

## 2019-06-21 PROCEDURE — 99214 OFFICE O/P EST MOD 30 MIN: CPT | Performed by: INTERNAL MEDICINE

## 2019-06-21 RX ORDER — LISINOPRIL 10 MG/1
10 TABLET ORAL DAILY
Qty: 90 TAB | Refills: 1 | OUTPATIENT
Start: 2019-06-21

## 2019-06-21 NOTE — PROGRESS NOTES
PRIMARY CARE CLINIC FOLLOW UP VISIT  Chief Complaint   Patient presents with   • Hypertension   • Fall     9 days ago   • Follow-Up     Saw surgeon/ aneurysm     History of Present Illness     Aortic aneurysm without rupture (HCC)  She just recently had a CTA with vascular surgery and noted to have a 5.3 cm AAA. Under monitoring q6 months with Dr. Saunders.     Fall  While squatting down to wipe something on the floor she fell backwards onto her buttocks about a week and a half ago. She is having pain of her buttocks. She hasn't been taking anything for the pain; takes ibuprofen every morning chronically for her back pain.     COPD (chronic obstructive pulmonary disease) (HCC)  She wants to attempt another 6 minute walk test; last time couldn't complete up to even 4 minutes due to being winded but would like to re-attempt this.     Essential hypertension  She was started on lisinopril 3/2019 during an ER visit for asymptomatic hypertension. However, now she is only 108/72 in the office and hasn't taken lisinopril for the last 2-3 days.     Current Outpatient Prescriptions   Medication Sig Dispense Refill   • pantoprazole (PROTONIX) 40 MG Tablet Delayed Response TAKE 1 TABLET BY MOUTH EVERY DAY 90 Tab 1   • Fluticasone-Umeclidin-Vilant 100-62.5-25 MCG/INH AEROSOL POWDER, BREATH ACTIVATED Inhale 1 Puff by mouth every day. 1 Each 3   • levothyroxine (SYNTHROID) 112 MCG Tab Take 1 Tab by mouth Every morning on an empty stomach. 90 Tab 3   • gabapentin (NEURONTIN) 100 MG Cap Take 1 Cap by mouth every day. 90 Cap 3   • tiotropium (SPIRIVA) 18 MCG Cap Inhale 1 Cap by mouth every day. 30 Cap 3   • albuterol 108 (90 Base) MCG/ACT Aero Soln inhalation aerosol Inhale 2 Puffs by mouth every 6 hours as needed for Shortness of Breath. 8.5 g 3   • alendronate (FOSAMAX) 70 MG Tab Take 70 mg by mouth.  3   • atorvastatin (LIPITOR) 40 MG Tab Take 0.5 Tabs by mouth every day. 30 Tab 1   • Nutritional Supplements (VITAMIN D BOOSTER PO)  "Take 1 Tab by mouth every day.     • aspirin EC (ECOTRIN) 81 MG Tablet Delayed Response Take 81 mg by mouth every day.       No current facility-administered medications for this visit.      Past Medical History:   Diagnosis Date   • Aortic aneurysm without rupture (HCC)    • Atherosclerosis of aorta (HCC) 3/27/2019   • Cataract     +surgery bilat   • Compression fracture of fourth lumbar vertebra (HCC) 3/28/2018   • Dental disorder 2018    upper and lower   • Emphysema of lung (HCC)    • Gastroesophageal reflux disease without esophagitis 2018   • Hiatus hernia syndrome    • Hypothyroidism 2016   • Mixed hyperlipidemia 2016   • Protein-calorie malnutrition (HCC) 3/28/2018     Past Surgical History:   Procedure Laterality Date   • ABDOMINAL HYSTERECTOMY TOTAL     • TONSILLECTOMY     • THYROIDECTOMY           Social History   Substance Use Topics   • Smoking status: Current Every Day Smoker     Packs/day: 1.00     Years: 50.00     Types: Cigarettes   • Smokeless tobacco: Never Used   • Alcohol use 0.6 oz/week     1 Glasses of wine per week      Comment: 3 per week     Social History     Social History Narrative    Lives with daughter Lynette      Family History   Problem Relation Age of Onset   • Cancer Mother    • Cancer Father         esophageal   • Stroke Father    • Lung Disease Sister         emphysema     Family Status   Relation Status   • Mo    • Fa    • Sis    • Sis    • Sis      Allergies: Patient has no known allergies.    ROS  As per HPI above. All other systems reviewed and negative.        Objective   /72 (BP Location: Right arm, Patient Position: Sitting, BP Cuff Size: Adult)   Pulse 83   Temp 37.1 °C (98.8 °F) (Temporal)   Resp 18   Ht 1.575 m (5' 2.01\")   Wt 41.2 kg (90 lb 12.8 oz)   SpO2 94%  Body mass index is 16.6 kg/m².    General: alert and oriented, pleasant, cooperative  HEENT: Normocephalic, atraumatic. "   Psychiatric: appropriate mood and affect. Good insight and appropriate judgment     Assessment and Plan   The following treatment plan was discussed     1. Aortic aneurysm without rupture, unspecified portion of aorta (HCC)  Following with Dr. Saunders, emphasized smoking cessation.     2. Fall, sequela  - DX-SACRUM AND COCCYX 2+; Future    3. Chronic obstructive pulmonary disease, unspecified COPD type (HCC)  Only briefly desaturated to 89% during her 6 minute walk test which she was able to complete. She is doing better since being on spiriva, emphasized smoking cessation.     4. Essential hypertension  Stay off of lisinopril 10 mg, blood pressure today only 108/72.     Healthcare maintenance     Health Maintenance Due   Topic Date Due   • IMM DTaP/Tdap/Td Vaccine (1 - Tdap) 12/25/1951   • Annual Wellness Visit  05/12/2019     No Follow-up on file.    Jayce Barron MD  Internal Medicine  [facility]

## 2019-06-21 NOTE — ASSESSMENT & PLAN NOTE
She just recently had a CTA with vascular surgery and noted to have a 5.3 cm AAA. Under monitoring q6 months with Dr. Saunders.

## 2019-06-21 NOTE — TELEPHONE ENCOUNTER
----- Message from Jayce Barron M.D. sent at 6/21/2019  3:34 PM PDT -----  Please let her know she doesn't have a fracture

## 2019-06-21 NOTE — ASSESSMENT & PLAN NOTE
While squatting down to wipe something on the floor she fell backwards onto her buttocks about a week and a half ago. She is having pain of her buttocks. She hasn't been taking anything for the pain; takes ibuprofen every morning chronically for her back pain.

## 2019-06-21 NOTE — ASSESSMENT & PLAN NOTE
She wants to attempt another 6 minute walk test; last time couldn't complete up to even 4 minutes due to being winded but would like to re-attempt this.

## 2019-06-21 NOTE — TELEPHONE ENCOUNTER
1. Caller Name: Mary  Call Back Number: [phone] (home)         Patient approves a detailed voicemail message: yes    LVM for pt notifying them that their XRAY does not show a fracture.

## 2019-06-21 NOTE — ASSESSMENT & PLAN NOTE
She was started on lisinopril 3/2019 during an ER visit for asymptomatic hypertension. However, now she is only 108/72 in the office and hasn't taken lisinopril for the last 2-3 days.

## 2019-06-25 ENCOUNTER — OFFICE VISIT (OUTPATIENT)
Dept: MEDICAL GROUP | Facility: PHYSICIAN GROUP | Age: 84
End: 2019-06-25
Payer: MEDICARE

## 2019-06-25 VITALS
RESPIRATION RATE: 18 BRPM | DIASTOLIC BLOOD PRESSURE: 78 MMHG | WEIGHT: 90 LBS | SYSTOLIC BLOOD PRESSURE: 102 MMHG | HEART RATE: 81 BPM | OXYGEN SATURATION: 93 % | TEMPERATURE: 98.9 F | HEIGHT: 62 IN | BODY MASS INDEX: 16.56 KG/M2

## 2019-06-25 DIAGNOSIS — T14.8XXA WOUND INFECTION: ICD-10-CM

## 2019-06-25 DIAGNOSIS — L08.9 WOUND INFECTION: ICD-10-CM

## 2019-06-25 PROCEDURE — 99214 OFFICE O/P EST MOD 30 MIN: CPT | Performed by: INTERNAL MEDICINE

## 2019-06-25 RX ORDER — CEPHALEXIN 500 MG/1
500 CAPSULE ORAL 3 TIMES DAILY
Qty: 21 CAP | Refills: 0 | Status: SHIPPED | OUTPATIENT
Start: 2019-06-25 | End: 2019-07-02

## 2019-06-25 NOTE — PROGRESS NOTES
PRIMARY CARE CLINIC FOLLOW UP VISIT  Chief Complaint   Patient presents with   • Wound Infection     Lt merida      History of Present Illness     Wound infection  She is now noting some swelling and redness of the wound of the lateral aspect of her lower extremity. Also noting pain, oozing.     Current Outpatient Prescriptions   Medication Sig Dispense Refill   • cephALEXin (KEFLEX) 500 MG Cap Take 1 Cap by mouth 3 times a day for 7 days. 21 Cap 0   • pantoprazole (PROTONIX) 40 MG Tablet Delayed Response TAKE 1 TABLET BY MOUTH EVERY DAY 90 Tab 1   • Fluticasone-Umeclidin-Vilant 100-62.5-25 MCG/INH AEROSOL POWDER, BREATH ACTIVATED Inhale 1 Puff by mouth every day. 1 Each 3   • levothyroxine (SYNTHROID) 112 MCG Tab Take 1 Tab by mouth Every morning on an empty stomach. 90 Tab 3   • gabapentin (NEURONTIN) 100 MG Cap Take 1 Cap by mouth every day. 90 Cap 3   • tiotropium (SPIRIVA) 18 MCG Cap Inhale 1 Cap by mouth every day. 30 Cap 3   • albuterol 108 (90 Base) MCG/ACT Aero Soln inhalation aerosol Inhale 2 Puffs by mouth every 6 hours as needed for Shortness of Breath. 8.5 g 3   • alendronate (FOSAMAX) 70 MG Tab Take 70 mg by mouth.  3   • atorvastatin (LIPITOR) 40 MG Tab Take 0.5 Tabs by mouth every day. 30 Tab 1   • Nutritional Supplements (VITAMIN D BOOSTER PO) Take 1 Tab by mouth every day.     • aspirin EC (ECOTRIN) 81 MG Tablet Delayed Response Take 81 mg by mouth every day.       No current facility-administered medications for this visit.      Past Medical History:   Diagnosis Date   • Aortic aneurysm without rupture (HCC)    • Atherosclerosis of aorta (HCC) 3/27/2019   • Cataract     +surgery bilat   • Compression fracture of fourth lumbar vertebra (HCC) 3/28/2018   • Dental disorder 03/20/2018    upper and lower   • Emphysema of lung (HCC)    • Gastroesophageal reflux disease without esophagitis 5/11/2018   • Hiatus hernia syndrome    • Hypothyroidism 2/8/2016   • Mixed hyperlipidemia 12/7/2016   •  "Protein-calorie malnutrition (HCC) 3/28/2018     Past Surgical History:   Procedure Laterality Date   • ABDOMINAL HYSTERECTOMY TOTAL     • TONSILLECTOMY  193   • THYROIDECTOMY           Social History   Substance Use Topics   • Smoking status: Current Every Day Smoker     Packs/day: 1.00     Years: 50.00     Types: Cigarettes   • Smokeless tobacco: Never Used   • Alcohol use 0.6 oz/week     1 Glasses of wine per week      Comment: 3 per week     Social History     Social History Narrative    Lives with daughter Lynette      Family History   Problem Relation Age of Onset   • Cancer Mother    • Cancer Father         esophageal   • Stroke Father    • Lung Disease Sister         emphysema     Family Status   Relation Status   • Mo    • Fa    • Sis    • Sis    • Sis      Allergies: Patient has no known allergies.    ROS  As per HPI above. All other systems reviewed and negative.        Objective   /78 (BP Cuff Size: Small adult)   Pulse 81   Temp 37.2 °C (98.9 °F)   Resp 18   Ht 1.575 m (5' 2.01\")   Wt 40.8 kg (90 lb)   SpO2 93%  Body mass index is 16.46 kg/m².    General: alert and oriented, pleasant, cooperative  HEENT: Normocephalic, atraumatic.   Skin: wound of lateral aspect of left lower extremity above ankle with slight red rim surround wound   Psychiatric: appropriate mood and affect. Good insight and appropriate judgment     Assessment and Plan   The following treatment plan was discussed     1. Wound infection  The wound doesn't exactly appear infected just yet, the slight inflammation is likely just that. Discussed wound care (continuing antimicrobial cream, using only non-stick gauze to help cover the wound). Discussed to start oral antibiotics only if redness, erythema spreads.   - cephALEXin (KEFLEX) 500 MG Cap; Take 1 Cap by mouth 3 times a day for 7 days.  Dispense: 21 Cap; Refill: 0    Healthcare maintenance     Health Maintenance Due   Topic " Date Due   • IMM DTaP/Tdap/Td Vaccine (1 - Tdap) 12/25/1951   • Annual Wellness Visit  05/12/2019     Return if symptoms worsen or fail to improve.    Jayce Barron MD  Internal Medicine  [facility]

## 2019-06-25 NOTE — ASSESSMENT & PLAN NOTE
She is now noting some swelling and redness of the wound of the lateral aspect of her lower extremity. Also noting pain, oozing.

## 2019-07-01 ENCOUNTER — OFFICE VISIT (OUTPATIENT)
Dept: URGENT CARE | Facility: PHYSICIAN GROUP | Age: 84
End: 2019-07-01
Payer: MEDICARE

## 2019-07-01 VITALS
RESPIRATION RATE: 16 BRPM | OXYGEN SATURATION: 96 % | SYSTOLIC BLOOD PRESSURE: 104 MMHG | TEMPERATURE: 98.2 F | DIASTOLIC BLOOD PRESSURE: 72 MMHG | HEART RATE: 88 BPM | BODY MASS INDEX: 16.56 KG/M2 | HEIGHT: 62 IN | WEIGHT: 90 LBS

## 2019-07-01 DIAGNOSIS — T14.8XXA INFECTED WOUND: ICD-10-CM

## 2019-07-01 DIAGNOSIS — L08.9 INFECTED WOUND: ICD-10-CM

## 2019-07-01 PROCEDURE — 99214 OFFICE O/P EST MOD 30 MIN: CPT | Performed by: NURSE PRACTITIONER

## 2019-07-01 RX ORDER — SULFAMETHOXAZOLE AND TRIMETHOPRIM 800; 160 MG/1; MG/1
1 TABLET ORAL 2 TIMES DAILY
Qty: 20 TAB | Refills: 0 | Status: SHIPPED | OUTPATIENT
Start: 2019-07-01 | End: 2019-07-11

## 2019-07-01 ASSESSMENT — ENCOUNTER SYMPTOMS
CHILLS: 0
FEVER: 0

## 2019-07-01 NOTE — PROGRESS NOTES
Subjective:      Mary Murrieta is a 86 y.o. female who presents with Wound Check (lower left leg skin tear x 2 weeks on antibiotics x 5 days and not getting better, increased ankle swelling )    Past Medical History:   Diagnosis Date   • Aortic aneurysm without rupture (HCC)    • Atherosclerosis of aorta (HCC) 3/27/2019   • Cataract     +surgery bilat   • Compression fracture of fourth lumbar vertebra (HCC) 3/28/2018   • Dental disorder 03/20/2018    upper and lower   • Emphysema of lung (HCC)    • Gastroesophageal reflux disease without esophagitis 5/11/2018   • Hiatus hernia syndrome    • Hypothyroidism 2/8/2016   • Mixed hyperlipidemia 12/7/2016   • Protein-calorie malnutrition (HCC) 3/28/2018     Social History     Social History   • Marital status:      Spouse name: N/A   • Number of children: N/A   • Years of education: N/A     Occupational History   • Not on file.     Social History Main Topics   • Smoking status: Current Every Day Smoker     Packs/day: 1.00     Years: 50.00     Types: Cigarettes   • Smokeless tobacco: Never Used   • Alcohol use 0.6 oz/week     1 Glasses of wine per week      Comment: 3 per week   • Drug use: No   • Sexual activity: No     Other Topics Concern   • Not on file     Social History Narrative    Lives with daughter Lynette      Family History   Problem Relation Age of Onset   • Cancer Mother    • Cancer Father         esophageal   • Stroke Father    • Lung Disease Sister         emphysema     Allergies: Patient has no known allergies.      Patient is an 86-year-old female who presents today for ongoing wound infection.  She was seen by her primary care physician on 6/25 and was started on Keflex.  Patient returns today with no improvement. States that redness has persisted and she has had some increased swelling.          Other   This is a new problem. The current episode started in the past 7 days. The problem occurs constantly. The problem has been unchanged. Pertinent  "negatives include no chills or fever. Nothing aggravates the symptoms. She has tried nothing for the symptoms. The treatment provided no relief.       Review of Systems   Constitutional: Negative for chills and fever.   Musculoskeletal:        Skin wound of leg   All other systems reviewed and are negative.         Objective:     /72 (BP Location: Right arm, Patient Position: Sitting, BP Cuff Size: Adult)   Pulse 88   Temp 36.8 °C (98.2 °F) (Temporal)   Resp 16   Ht 1.575 m (5' 2\")   Wt 40.8 kg (90 lb)   LMP 03/03/1970 (Approximate)   SpO2 96%   BMI 16.46 kg/m²      Physical Exam   Constitutional: She appears well-developed and well-nourished.   Skin:        Skin tear to the LLE with surrounding erythema.  No moisture or drainage. No fluctuance. Wound is scabbed    Second skin tear to the RLE, also healing without moisture or drainage.  2 mm perimeter of erythema, tender to palpation.    Psychiatric: She has a normal mood and affect. Her behavior is normal. Judgment and thought content normal.   Vitals reviewed.              Assessment/Plan:   Infected wound, RLE, LLE    Continue Keflex  Add bactrim DS  bactroban topical  Return to  in 48 hours for recheck  Return sooner for increasing redness or pain.   There are no diagnoses linked to this encounter.      "

## 2019-07-03 ENCOUNTER — OFFICE VISIT (OUTPATIENT)
Dept: URGENT CARE | Facility: PHYSICIAN GROUP | Age: 84
End: 2019-07-03
Payer: MEDICARE

## 2019-07-03 VITALS
BODY MASS INDEX: 16.56 KG/M2 | OXYGEN SATURATION: 94 % | DIASTOLIC BLOOD PRESSURE: 68 MMHG | TEMPERATURE: 98.6 F | RESPIRATION RATE: 17 BRPM | HEIGHT: 62 IN | HEART RATE: 80 BPM | WEIGHT: 90 LBS | SYSTOLIC BLOOD PRESSURE: 106 MMHG

## 2019-07-03 DIAGNOSIS — L08.9 INFECTED WOUND: ICD-10-CM

## 2019-07-03 DIAGNOSIS — T14.8XXA INFECTED WOUND: ICD-10-CM

## 2019-07-03 PROCEDURE — 99213 OFFICE O/P EST LOW 20 MIN: CPT | Performed by: NURSE PRACTITIONER

## 2019-07-03 ASSESSMENT — ENCOUNTER SYMPTOMS
WEAKNESS: 0
SENSORY CHANGE: 0
MYALGIAS: 0
CHILLS: 0
TINGLING: 0
FEVER: 0

## 2019-07-03 NOTE — PROGRESS NOTES
Subjective:      Mary Murrieta is a 86 y.o. female who presents with Wound Check (wounds on Lateral portion of both calfs)            HPI  Wound recheck. Skin tears from bumping into  and car door on 2 different occasions. Seen initially by PCP on 6/25/19. States has finished Kelfex given by PCP. Still taking Bactrim and Bactroban from last visit 2 days ago. States cleaning with soap and water, no bandage. Denies d/c from site. No fever or malaise.     PMH:  has a past medical history of Aortic aneurysm without rupture (HCC); Atherosclerosis of aorta (HCC) (3/27/2019); Cataract; Compression fracture of fourth lumbar vertebra (HCC) (3/28/2018); Dental disorder (03/20/2018); Emphysema of lung ([facility]); Gastroesophageal reflux disease without esophagitis (5/11/2018); Hiatus hernia syndrome; Hypothyroidism (2/8/2016); Mixed hyperlipidemia (12/7/2016); and Protein-calorie malnutrition (HCC) (3/28/2018).  MEDS:   Current Outpatient Prescriptions:   •  mupirocin (BACTROBAN) 2 % Ointment, Apply 1 Application to affected area(s) 2 times a day for 10 days., Disp: 1 Tube, Rfl: 0  •  Fluticasone-Umeclidin-Vilant 100-62.5-25 MCG/INH AEROSOL POWDER, BREATH ACTIVATED, Inhale 1 Puff by mouth every day., Disp: 1 Each, Rfl: 3  •  levothyroxine (SYNTHROID) 112 MCG Tab, Take 1 Tab by mouth Every morning on an empty stomach., Disp: 90 Tab, Rfl: 3  •  gabapentin (NEURONTIN) 100 MG Cap, Take 1 Cap by mouth every day., Disp: 90 Cap, Rfl: 3  •  tiotropium (SPIRIVA) 18 MCG Cap, Inhale 1 Cap by mouth every day., Disp: 30 Cap, Rfl: 3  •  atorvastatin (LIPITOR) 40 MG Tab, Take 0.5 Tabs by mouth every day., Disp: 30 Tab, Rfl: 1  •  Nutritional Supplements (VITAMIN D BOOSTER PO), Take 1 Tab by mouth every day., Disp: , Rfl:   •  aspirin EC (ECOTRIN) 81 MG Tablet Delayed Response, Take 81 mg by mouth every day., Disp: , Rfl:   •  sulfamethoxazole-trimethoprim (BACTRIM DS) 800-160 MG tablet, Take 1 Tab by mouth 2 times a day for 10 days.  "(Patient not taking: Reported on 7/3/2019), Disp: 20 Tab, Rfl: 0  •  pantoprazole (PROTONIX) 40 MG Tablet Delayed Response, TAKE 1 TABLET BY MOUTH EVERY DAY (Patient not taking: Reported on 7/3/2019), Disp: 90 Tab, Rfl: 1  •  albuterol 108 (90 Base) MCG/ACT Aero Soln inhalation aerosol, Inhale 2 Puffs by mouth every 6 hours as needed for Shortness of Breath., Disp: 8.5 g, Rfl: 3  •  alendronate (FOSAMAX) 70 MG Tab, Take 70 mg by mouth., Disp: , Rfl: 3  ALLERGIES: No Known Allergies  SURGHX:   Past Surgical History:   Procedure Laterality Date   • ABDOMINAL HYSTERECTOMY TOTAL  1986   • TONSILLECTOMY  1938   • THYROIDECTOMY      1976     SOCHX:  reports that she has been smoking Cigarettes.  She has a 50.00 pack-year smoking history. She has never used smokeless tobacco. She reports that she drinks about 0.6 oz of alcohol per week . She reports that she does not use drugs.  FH: Family history was reviewed, no pertinent findings to report    Review of Systems   Constitutional: Negative for chills, fever and malaise/fatigue.   Cardiovascular: Negative for leg swelling.   Musculoskeletal: Negative for joint pain and myalgias.   Skin: Negative for itching and rash.   Neurological: Negative for tingling, sensory change and weakness.   All other systems reviewed and are negative.         Objective:     /68 (BP Location: Left arm, Patient Position: Sitting, BP Cuff Size: Adult)   Pulse 80   Temp 37 °C (98.6 °F) (Temporal)   Resp 17   Ht 1.575 m (5' 2\")   Wt 40.8 kg (90 lb)   LMP 03/03/1970 (Approximate)   SpO2 94%   BMI 16.46 kg/m²      Physical Exam   Constitutional: She is oriented to person, place, and time. Vital signs are normal. She appears well-developed and well-nourished. She is active and cooperative.  Non-toxic appearance. She does not have a sickly appearance. She does not appear ill. No distress.   HENT:   Head: Normocephalic.   Eyes: Pupils are equal, round, and reactive to light. Conjunctivae and " EOM are normal.   Cardiovascular: Normal rate.    Pulmonary/Chest: Effort normal. No accessory muscle usage. She has no decreased breath sounds. She has no rhonchi.   Musculoskeletal: Normal range of motion. She exhibits no edema, tenderness or deformity.        Right lower leg: She exhibits no tenderness, no bony tenderness, no swelling, no edema and no deformity.        Left lower leg: She exhibits no tenderness, no bony tenderness, no swelling, no edema and no deformity.   Neurological: She is alert and oriented to person, place, and time. She has normal strength. No cranial nerve deficit or sensory deficit. Coordination and gait normal. GCS eye subscore is 4. GCS verbal subscore is 5. GCS motor subscore is 6.   Skin: Skin is warm and dry. Abrasion noted. No bruising, no ecchymosis and no rash noted. She is not diaphoretic. No erythema.        Skin tears to the LLE and RLE with surrounding erythema, L>R.  No moisture or drainage. No fluctuance or desquamation. Wounds are scabbed. Unable to see condition of underlying skin without debridement. Walks with walker. No antalgic gait.       Vitals reviewed.              Assessment/Plan:     1. Infected wound    - REFERRAL TO WOUND CLINIC    Continue oral and topical abx  May apply cool compress to affected areas for swelling and discomfort  Keep area clean with mild soap and tepid water, pat dry  May cover loosely with bandage to prevent dirt or debris into wounds prn  Monitor for skin infection with increased swelling, redness, d/c, red streaking, fever, malaise or bleeding from site- may recheck in   F/u wound care for debridement

## 2019-07-12 ENCOUNTER — OFFICE VISIT (OUTPATIENT)
Dept: URGENT CARE | Facility: PHYSICIAN GROUP | Age: 84
End: 2019-07-12
Payer: MEDICARE

## 2019-07-12 VITALS
OXYGEN SATURATION: 99 % | HEART RATE: 86 BPM | SYSTOLIC BLOOD PRESSURE: 118 MMHG | TEMPERATURE: 99 F | HEIGHT: 62 IN | RESPIRATION RATE: 16 BRPM | DIASTOLIC BLOOD PRESSURE: 58 MMHG | WEIGHT: 90.2 LBS | BODY MASS INDEX: 16.6 KG/M2

## 2019-07-12 DIAGNOSIS — T14.8XXA INFECTED WOUND: ICD-10-CM

## 2019-07-12 DIAGNOSIS — S81.819D: ICD-10-CM

## 2019-07-12 DIAGNOSIS — L08.9 INFECTED WOUND: ICD-10-CM

## 2019-07-12 PROCEDURE — 99214 OFFICE O/P EST MOD 30 MIN: CPT | Performed by: NURSE PRACTITIONER

## 2019-07-12 RX ORDER — SULFAMETHOXAZOLE AND TRIMETHOPRIM 800; 160 MG/1; MG/1
1 TABLET ORAL 2 TIMES DAILY
Qty: 14 TAB | Refills: 0 | Status: SHIPPED | OUTPATIENT
Start: 2019-07-12 | End: 2019-07-19

## 2019-07-12 NOTE — PATIENT INSTRUCTIONS
Skin Tear Care  A skin tear is a wound in which the top layers of skin have peeled off the deeper skin or tissues underneath them. This is a common problem as people get older because the skin becomes thinner and more fragile. In addition, some medicines, such as oral corticosteroids, can lead to skin thinning if they are taken for long periods of time.  A skin tear is often repaired with tape or skin adhesive strips. Depending on the location of the wound, a bandage (dressing) may be applied over the tape or skin adhesive strips.  How is this treated?  Wound Care  · Clean the wound as told by your health care provider. You may be instructed to keep the wound dry for the first few days. If you are told to clean the wound:  ¨ Wash the wound with mild soap and water or a salt-water (saline) solution.  ¨ Rinse the wound with water to remove all soap.  ¨ Do not rub the wound dry. Let the wound air dry.  · Change any dressings as told by your health care provider. This includes changing the dressing if it gets wet, gets dirty, or starts to smell bad.  · Do not scratch or pick at the wound.  · Protect the injured area until it has healed.  · Check your wound every day for signs of infection. Check for:  ¨ More redness, swelling, or pain.  ¨ More fluid or blood.  ¨ Warmth.  ¨ Pus or a bad smell.  Medicines   · Take over-the-counter and prescription medicines only as told by your health care provider.  · If you were prescribed an antibiotic medicine, take or apply it as told by your health care provider. Do not stop using the antibiotic even if your condition improves.  General Instructions  · Keep the dressing dry as told by your health care provider.  · Do not take baths, swim, or do anything that puts your wound underwater until your health care provider approves.  · Keep all follow-up visits as told by your health care provider. This is important.  Contact a health care provider if:  · You have more redness, swelling,  or pain around your wound.  · You have more fluid or blood coming from your wound.  · Your wound feels warm to the touch.  · You have pus or a bad smell coming from your wound.  Get help right away if:  · You have a red streak that goes away from the skin tear.  · You have a fever and chills and your symptoms suddenly get worse.  This information is not intended to replace advice given to you by your health care provider. Make sure you discuss any questions you have with your health care provider.  Document Released: 09/12/2002 Document Revised: 08/13/2017 Document Reviewed: 11/07/2016  [employer] Interactive Patient Education © 2017 [employer]

## 2019-07-12 NOTE — PROGRESS NOTES
Subjective:     Mary Murrieta is a 86 y.o. female who presents for Wound Check (pt wants antibiotics refilled, last round was finished yesterday)       Wound Check       Patient initially seen by her PCP on 6/25/2019 after bumping into a  in a car door on 2 separate occasions.  She has skin tears to her anterior left lower and right lower extremity.  She was started on Keflex prophylactically.  She returned to urgent care on 7/1/2019 with concerns about infection due to increased redness and swelling and was also started on Bactrim DS.  Patient return for wound check on 7/3/2019 referred to the wound clinic.  Patient states that she has been performing daily wound care.  Reports that the redness and swelling surrounding her skin tears have decreased but continues to verbalize concern of infection.  Patient reports that 2 days ago she suffered to small new skin tears at her inner right lower leg.    PMH:  has a past medical history of Aortic aneurysm without rupture ([facility]); Atherosclerosis of aorta ([facility]) (3/27/2019); Cataract; Compression fracture of fourth lumbar vertebra (HCC) (3/28/2018); Dental disorder (03/20/2018); Emphysema of lung ([facility]); Gastroesophageal reflux disease without esophagitis (5/11/2018); Hiatus hernia syndrome; Hypothyroidism (2/8/2016); Mixed hyperlipidemia (12/7/2016); and Protein-calorie malnutrition (HCC) (3/28/2018).    MEDS:   Current Outpatient Prescriptions:   •  sulfamethoxazole-trimethoprim (BACTRIM DS) 800-160 MG tablet, Take 1 Tab by mouth 2 times a day for 7 days., Disp: 14 Tab, Rfl: 0  •  pantoprazole (PROTONIX) 40 MG Tablet Delayed Response, TAKE 1 TABLET BY MOUTH EVERY DAY, Disp: 90 Tab, Rfl: 1  •  Fluticasone-Umeclidin-Vilant 100-62.5-25 MCG/INH AEROSOL POWDER, BREATH ACTIVATED, Inhale 1 Puff by mouth every day., Disp: 1 Each, Rfl: 3  •  levothyroxine (SYNTHROID) 112 MCG Tab, Take 1 Tab by mouth Every morning on an empty stomach., Disp: 90 Tab, Rfl: 3  •  gabapentin  "(NEURONTIN) 100 MG Cap, Take 1 Cap by mouth every day., Disp: 90 Cap, Rfl: 3  •  tiotropium (SPIRIVA) 18 MCG Cap, Inhale 1 Cap by mouth every day., Disp: 30 Cap, Rfl: 3  •  alendronate (FOSAMAX) 70 MG Tab, Take 70 mg by mouth., Disp: , Rfl: 3  •  atorvastatin (LIPITOR) 40 MG Tab, Take 0.5 Tabs by mouth every day., Disp: 30 Tab, Rfl: 1  •  Nutritional Supplements (VITAMIN D BOOSTER PO), Take 1 Tab by mouth every day., Disp: , Rfl:   •  aspirin EC (ECOTRIN) 81 MG Tablet Delayed Response, Take 81 mg by mouth every day., Disp: , Rfl:   •  albuterol 108 (90 Base) MCG/ACT Aero Soln inhalation aerosol, Inhale 2 Puffs by mouth every 6 hours as needed for Shortness of Breath., Disp: 8.5 g, Rfl: 3    ALLERGIES: No Known Allergies    SURGHX:   Past Surgical History:   Procedure Laterality Date   • ABDOMINAL HYSTERECTOMY TOTAL  1986   • TONSILLECTOMY  1938   • THYROIDECTOMY      1976     SOCHX:  reports that she has been smoking Cigarettes.  She has a 50.00 pack-year smoking history. She has never used smokeless tobacco. She reports that she drinks about 0.6 oz of alcohol per week . She reports that she does not use drugs.     FH: Reviewed with patient, not pertinent to this visit.    ROS  Objective:     /58 (BP Location: Left arm, Patient Position: Sitting, BP Cuff Size: Adult)   Pulse 86   Temp 37.2 °C (99 °F) (Temporal)   Resp 16   Ht 1.575 m (5' 2\")   Wt 40.9 kg (90 lb 3.2 oz)   LMP 03/03/1970 (Approximate)   SpO2 99%   BMI 16.50 kg/m²     Physical Exam   Constitutional: She is oriented to person, place, and time. She appears well-developed and well-nourished. She is cooperative.  Non-toxic appearance. No distress.   HENT:   Right Ear: External ear normal.   Left Ear: External ear normal.   Nose: Nose normal.   Mouth/Throat: Mucous membranes are normal.   Eyes: Conjunctivae and EOM are normal.   Neck: Normal range of motion.   Cardiovascular: Normal rate, regular rhythm, normal heart sounds and normal pulses.  "   Pulmonary/Chest: Effort normal and breath sounds normal. No respiratory distress. She has no decreased breath sounds.   Abdominal: Bowel sounds are normal.   Musculoskeletal: Normal range of motion. She exhibits no deformity.   Neurological: She is alert and oriented to person, place, and time. She has normal strength. No sensory deficit. Coordination and gait normal.   Skin: Skin is warm, dry and intact. Capillary refill takes less than 2 seconds.        Anterior LLE: skin tear with scab in center and surrounding erythema, warmth; no discharge    Anterior RLE: skin tear with scabbing, surrounding erythema, and warmth; no discharge    Medial RLE: two small skin tears with flaps of skin intact; no surrounding erythema, warmth, or discharge   Psychiatric: She has a normal mood and affect. Her behavior is normal.   Vitals reviewed.       Assessment/Plan:     1. Infected wound  - sulfamethoxazole-trimethoprim (BACTRIM DS) 800-160 MG tablet; Take 1 Tab by mouth 2 times a day for 7 days.  Dispense: 14 Tab; Refill: 0    2. Tear of skin of multiple sites of lower extremity, unspecified laterality, subsequent encounter    Wound care of skin tears performed in clinic.  Rx sent electronically for extension of Bactrim DS.  Patient advised to continue with wound care. Patient advised to monitor for signs of worsening infection including, but not limited to, increased redness, warmth, pain, swelling, discharge, or fever. Discussed close monitoring and supportive measures including increasing fluids and rest as well as OTC symptom management including acetaminophen and/or ibuprofen PRN pain.    Patient advised to: Return for 1) Symptoms don't improve or worsen, or go to ER, 2) Follow up with primary care in 7-10 days.    Differential diagnosis, natural history, supportive care, and indications for immediate follow-up discussed. All questions answered. Patient agrees with the plan of care.

## 2019-07-27 DIAGNOSIS — J44.9 CHRONIC OBSTRUCTIVE PULMONARY DISEASE, UNSPECIFIED COPD TYPE (HCC): ICD-10-CM

## 2019-07-29 RX ORDER — BUDESONIDE AND FORMOTEROL FUMARATE DIHYDRATE 160; 4.5 UG/1; UG/1
2 AEROSOL RESPIRATORY (INHALATION) 2 TIMES DAILY
Qty: 30.6 INHALER | Refills: 0 | OUTPATIENT
Start: 2019-07-29 | End: 2019-08-01

## 2019-07-29 NOTE — TELEPHONE ENCOUNTER
Was the patient seen in the last year in this department? Yes    Does patient have an active prescription for medications requested? No     Received Request Via: Pharmacy      Pt met protocol?: Yes    OV 6/19

## 2019-08-08 ENCOUNTER — APPOINTMENT (OUTPATIENT)
Dept: WOUND CARE | Facility: MEDICAL CENTER | Age: 84
End: 2019-08-08
Attending: NURSE PRACTITIONER
Payer: MEDICARE

## 2019-08-27 ENCOUNTER — HOSPITAL ENCOUNTER (EMERGENCY)
Facility: MEDICAL CENTER | Age: 84
End: 2019-08-27
Attending: EMERGENCY MEDICINE
Payer: MEDICARE

## 2019-08-27 VITALS
TEMPERATURE: 98.6 F | RESPIRATION RATE: 18 BRPM | DIASTOLIC BLOOD PRESSURE: 96 MMHG | SYSTOLIC BLOOD PRESSURE: 157 MMHG | BODY MASS INDEX: 17.54 KG/M2 | HEART RATE: 64 BPM | OXYGEN SATURATION: 94 % | WEIGHT: 95.9 LBS

## 2019-08-27 DIAGNOSIS — S51.811A SKIN TEAR OF RIGHT FOREARM WITHOUT COMPLICATION, INITIAL ENCOUNTER: ICD-10-CM

## 2019-08-27 PROCEDURE — 99283 EMERGENCY DEPT VISIT LOW MDM: CPT

## 2019-08-27 PROCEDURE — 90715 TDAP VACCINE 7 YRS/> IM: CPT | Performed by: EMERGENCY MEDICINE

## 2019-08-27 PROCEDURE — 90471 IMMUNIZATION ADMIN: CPT

## 2019-08-27 PROCEDURE — 304217 HCHG IRRIGATION SYSTEM

## 2019-08-27 PROCEDURE — 700111 HCHG RX REV CODE 636 W/ 250 OVERRIDE (IP): Performed by: EMERGENCY MEDICINE

## 2019-08-27 RX ORDER — CEPHALEXIN 500 MG/1
500 CAPSULE ORAL 3 TIMES DAILY
Qty: 15 CAP | Refills: 0 | Status: SHIPPED | OUTPATIENT
Start: 2019-08-27 | End: 2019-09-01

## 2019-08-27 RX ADMIN — CLOSTRIDIUM TETANI TOXOID ANTIGEN (FORMALDEHYDE INACTIVATED), CORYNEBACTERIUM DIPHTHERIAE TOXOID ANTIGEN (FORMALDEHYDE INACTIVATED), BORDETELLA PERTUSSIS TOXOID ANTIGEN (GLUTARALDEHYDE INACTIVATED), BORDETELLA PERTUSSIS FILAMENTOUS HEMAGGLUTININ ANTIGEN (FORMALDEHYDE INACTIVATED), BORDETELLA PERTUSSIS PERTACTIN ANTIGEN, AND BORDETELLA PERTUSSIS FIMBRIAE 2/3 ANTIGEN 0.5 ML: 5; 2; 2.5; 5; 3; 5 INJECTION, SUSPENSION INTRAMUSCULAR at 12:23

## 2019-08-27 NOTE — ED TRIAGE NOTES
Amb to triage w/ c/o a skin tear/laceration to R forearm secondary to a mechanical GLF 2 days ago. Gauze dressing in place.  Pt moved to [facility].

## 2019-08-27 NOTE — ED PROVIDER NOTES
"ED Provider Note    Scribed for Jakub Rodriguez D.O. by Adis Felipe. 8/27/2019  11:43 AM    Primary care provider: Jayce Barron M.D.  Means of arrival: Walk-In  History obtained from: Patient  History limited by: None    CHIEF COMPLAINT  Chief Complaint   Patient presents with   • T-5000 GLF   • Laceration       HPI  Mary Murreita is a 86 y.o. female who presents to the Emergency Department for evaluation of a laceration on her right forearm, onset two days ago. She states that she fell getting out of the shower when she was trying to take a seat on her toilet. At bedside she denies hitting her head and states that she is most likely not up to date on her tetanus. She adds that it initially \"did not look too bad\", but the skin has broken away from her injury, so she is here today to have it evaluated. She states that he has been putting neosporin on her wound.    REVIEW OF SYSTEMS  Pertinent positives include laceration on her right forearm. Pertinent negatives include no fever.      PAST MEDICAL HISTORY  Past Medical History:   Diagnosis Date   • Aortic aneurysm without rupture (HCC)    • Atherosclerosis of aorta (HCC) 3/27/2019   • Cataract     +surgery bilat   • Compression fracture of fourth lumbar vertebra (HCC) 3/28/2018   • Dental disorder 03/20/2018    upper and lower   • Emphysema of lung (HCC)    • Gastroesophageal reflux disease without esophagitis 5/11/2018   • Hiatus hernia syndrome    • Hypothyroidism 2/8/2016   • Mixed hyperlipidemia 12/7/2016   • Protein-calorie malnutrition (HCC) 3/28/2018       SURGICAL HISTORY  Past Surgical History:   Procedure Laterality Date   • ABDOMINAL HYSTERECTOMY TOTAL  1986   • TONSILLECTOMY  1938   • THYROIDECTOMY      1976        SOCIAL HISTORY  Social History     Tobacco Use   • Smoking status: Current Every Day Smoker     Packs/day: 1.00     Years: 50.00     Pack years: 50.00     Types: Cigarettes   • Smokeless tobacco: Never Used   Substance Use Topics "   • Alcohol use: Yes     Alcohol/week: 0.6 oz     Types: 1 Glasses of wine per week     Comment: 3 per week   • Drug use: No      Social History     Substance and Sexual Activity   Drug Use No       FAMILY HISTORY  Family History   Problem Relation Age of Onset   • Cancer Mother    • Cancer Father         esophageal   • Stroke Father    • Lung Disease Sister         emphysema       CURRENT MEDICATIONS  Home Medications    **Home medications have not yet been reviewed for this encounter**         ALLERGIES  No Known Allergies    PHYSICAL EXAM  VITAL SIGNS: /96   Pulse 64   Temp 37 °C (98.6 °F) (Temporal)   Resp 18   Wt 43.5 kg (95 lb 14.4 oz)   LMP 03/03/1970 (Approximate)   SpO2 94%   BMI 17.54 kg/m²   Skin: 5cm skin tear to right lateral forearm with surrounding ecchymosis, Warm, Dry, No erythema, No rash.   Extremities: No edema, No evidence of deformity, Full range of motion, skin findings as above    DIAGNOSTIC STUDIES/PROCEDURES      COURSE & MEDICAL DECISION MAKING  Nursing notes, VS, PMSFHx reviewed in chart.    11:43 AM - Patient seen and examined at bedside. We discussed the plan of care that includes closing her wound with Steri-strips and placing her on antibiotics to mitigate the risk of infection. Patient will be treated with Adacel injection 0.5ml.    [facility] 86 [facility] that presents with a right forearm skin tear.  The patient is 2 days out from her fall yet at this point I do believe we can approximate the tissue as she states that it was initially approximated but today it opened up therefore we reapproximated the tissue again with Steri-Strips.  Tetanus booster was updated.  The patient received a prescription for Keflex secondary to delayed closure.  Strict return precautions have been given.    The patient's blood pressure was found to be elevated and for this reason was informed to follow-up with their primary care physician for reevaluation.     DISPOSITION:  Patient  will be discharged home in stable condition.    FOLLOW UP:  [facility], Emergency Dept  [address]  [place] [address] [phone]    If symptoms worsen    Jayce Barron M.D.  [address]  [place] [id number] [phone]    Schedule an appointment as soon as possible for a visit in 3 days  As needed      OUTPATIENT MEDICATIONS:  Discharge Medication List as of 8/27/2019 12:37 PM      START taking these medications    Details   cephALEXin (KEFLEX) 500 MG Cap Take 1 Cap by mouth 3 times a day for 5 days., Disp-15 Cap, R-0, Print Rx Paper             FINAL IMPRESSION  1. Skin tear of right forearm without complication, initial encounter Active         Adis KATZ (Scribe), am scribing for, and in the presence of, Jakub Rodriguez D.O.    Electronically signed by: Adis Felipe (Miraibe), 8/27/2019    I, Jakub Rodriguez D.O. personally performed the services described in this documentation, as scribed by Adis Felipe in my presence, and it is both accurate and complete. E    The note accurately reflects work and decisions made by me.  Jakub Rodriguez  8/27/2019  4:42 PM

## 2019-08-27 NOTE — DISCHARGE INSTRUCTIONS
The Steri-Strips will fall off on their own.  Return to the emergency department for evidence of redness, discharge, fever.

## 2019-08-27 NOTE — ED NOTES
Wound closed with 5 steri strips, due to loss of tissue at the wound site PTA wound has small gap where it is not fully approximated.  MD aware.  PT given home instructions, verbalizes understanding

## 2019-08-30 ENCOUNTER — PATIENT OUTREACH (OUTPATIENT)
Dept: HEALTH INFORMATION MANAGEMENT | Facility: OTHER | Age: 84
End: 2019-08-30

## 2019-09-03 ENCOUNTER — OFFICE VISIT (OUTPATIENT)
Dept: MEDICAL GROUP | Facility: PHYSICIAN GROUP | Age: 84
End: 2019-09-03
Payer: MEDICARE

## 2019-09-03 VITALS
HEIGHT: 62 IN | TEMPERATURE: 97.7 F | DIASTOLIC BLOOD PRESSURE: 80 MMHG | OXYGEN SATURATION: 96 % | RESPIRATION RATE: 18 BRPM | WEIGHT: 92.6 LBS | BODY MASS INDEX: 17.04 KG/M2 | SYSTOLIC BLOOD PRESSURE: 120 MMHG | HEART RATE: 88 BPM

## 2019-09-03 DIAGNOSIS — S41.119S: ICD-10-CM

## 2019-09-03 DIAGNOSIS — E78.2 MIXED HYPERLIPIDEMIA: ICD-10-CM

## 2019-09-03 PROBLEM — S41.119A LACERATION OF ARM: Status: ACTIVE | Noted: 2019-09-03

## 2019-09-03 PROCEDURE — 99213 OFFICE O/P EST LOW 20 MIN: CPT | Performed by: INTERNAL MEDICINE

## 2019-09-03 RX ORDER — ATORVASTATIN CALCIUM 40 MG/1
10 TABLET, FILM COATED ORAL
Qty: 45 TAB | Refills: 3 | Status: SHIPPED | OUTPATIENT
Start: 2019-09-03 | End: 2019-11-21 | Stop reason: SDUPTHER

## 2019-09-03 NOTE — ASSESSMENT & PLAN NOTE
Was in the ER last Wednesday for a fall from two days prior. She was getting out of the shower and slipped on a towel and fell between the toilet and the tub. She hit the tub with her right arm. She denies loss of consciousness and hitting her head. Went to the ER two days later since the laceration opened up. They placed steri-strips and gave her keflex, of which she only missed one dose. Hasn't had any issues with drainage from her wound .

## 2019-09-03 NOTE — PROGRESS NOTES
PRIMARY CARE CLINIC FOLLOW UP VISIT  Chief Complaint   Patient presents with   • Follow-Up     ER- Fall/ Arm Laceration   • Hyperlipidemia     Atorvastatin     History of Present Illness     Laceration of arm  Was in the ER last Wednesday for a fall from two days prior. She was getting out of the shower and slipped on a towel and fell between the toilet and the tub. She hit the tub with her right arm. She denies loss of consciousness and hitting her head. Went to the ER two days later since the laceration opened up. They placed steri-strips and gave her keflex, of which she only missed one dose. Hasn't had any issues with drainage from her wound .    Current Outpatient Medications   Medication Sig Dispense Refill   • atorvastatin (LIPITOR) 40 MG Tab Take 0.5 Tabs by mouth every day. 45 Tab 3   • Fluticasone-Umeclidin-Vilant 100-62.5-25 MCG/INH AEROSOL POWDER, BREATH ACTIVATED Inhale 1 Puff by mouth every day. 3 Each 1   • pantoprazole (PROTONIX) 40 MG Tablet Delayed Response TAKE 1 TABLET BY MOUTH EVERY DAY 90 Tab 1   • levothyroxine (SYNTHROID) 112 MCG Tab Take 1 Tab by mouth Every morning on an empty stomach. 90 Tab 3   • gabapentin (NEURONTIN) 100 MG Cap Take 1 Cap by mouth every day. 90 Cap 3   • tiotropium (SPIRIVA) 18 MCG Cap Inhale 1 Cap by mouth every day. 30 Cap 3   • albuterol 108 (90 Base) MCG/ACT Aero Soln inhalation aerosol Inhale 2 Puffs by mouth every 6 hours as needed for Shortness of Breath. 8.5 g 3   • alendronate (FOSAMAX) 70 MG Tab Take 70 mg by mouth.  3   • Nutritional Supplements (VITAMIN D BOOSTER PO) Take 1 Tab by mouth every day.     • aspirin EC (ECOTRIN) 81 MG Tablet Delayed Response Take 81 mg by mouth every day.       No current facility-administered medications for this visit.      Past Medical History:   Diagnosis Date   • Aortic aneurysm without rupture (HCC)    • Atherosclerosis of aorta (HCC) 3/27/2019   • Cataract     +surgery bilat   • Compression fracture of fourth lumbar  "vertebra (HCC) 3/28/2018   • Dental disorder 2018    upper and lower   • Emphysema of lung (HCC)    • Gastroesophageal reflux disease without esophagitis 2018   • Hiatus hernia syndrome    • Hypothyroidism 2016   • Mixed hyperlipidemia 2016   • Protein-calorie malnutrition (HCC) 3/28/2018     Past Surgical History:   Procedure Laterality Date   • ABDOMINAL HYSTERECTOMY TOTAL     • TONSILLECTOMY  89   • THYROIDECTOMY           Social History     Tobacco Use   • Smoking status: Current Every Day Smoker     Packs/day: 1.00     Years: 50.00     Pack years: 50.00     Types: Cigarettes   • Smokeless tobacco: Never Used   Substance Use Topics   • Alcohol use: Yes     Alcohol/week: 0.6 oz     Types: 1 Glasses of wine per week     Comment: 3 per week   • Drug use: No     Social History     Social History Narrative    Lives with daughter Lynette      Family History   Problem Relation Age of Onset   • Cancer Mother    • Cancer Father         esophageal   • Stroke Father    • Lung Disease Sister         emphysema     Family Status   Relation Name Status   • Mo     • Fa     • Sis     • Sis     • Sis       Allergies: Patient has no known allergies.    ROS  As per HPI above. All other systems reviewed and negative.        Objective   /80 (BP Location: Left arm, Patient Position: Sitting, BP Cuff Size: Adult)   Pulse 88   Temp 36.5 °C (97.7 °F) (Temporal)   Resp 18   Ht 1.575 m (5' 2\")   Wt 42 kg (92 lb 9.6 oz)   SpO2 96%  Body mass index is 16.94 kg/m².    General: alert and oriented, pleasant, cooperative  HEENT: Normocephalic, atraumatic.   Skin: right laceration with clean, dry intact steri-strips with intact scab   Psychiatric: appropriate mood and affect. Good insight and appropriate judgment     Assessment and Plan   The following treatment plan was discussed     1. Laceration of upper extremity, unspecified laterality, sequela  Her laceration is " scabbing over, no evidence of infection. Advised to keep steri-strips on and clean with gentle soap and lukewarm water.     2. Mixed hyperlipidemia  - atorvastatin (LIPITOR) 40 MG Tab; Take 0.5 Tabs by mouth every day.  Dispense: 45 Tab; Refill: 3    Healthcare maintenance     Health Maintenance Due   Topic Date Due   • IMM HEP B VACCINE (1 of 3 - Risk 3-dose series) 12/25/1951   • Annual Wellness Visit  05/12/2019   • IMM INFLUENZA (1) 09/01/2019     Return if symptoms worsen or fail to improve.    Jayce Barron MD  Internal Medicine  [facility]

## 2019-09-19 DIAGNOSIS — J44.9 CHRONIC OBSTRUCTIVE PULMONARY DISEASE, UNSPECIFIED COPD TYPE (HCC): ICD-10-CM

## 2019-09-19 RX ORDER — TIOTROPIUM BROMIDE 18 UG/1
CAPSULE ORAL; RESPIRATORY (INHALATION)
Qty: 90 CAP | Refills: 0 | Status: SHIPPED | OUTPATIENT
Start: 2019-09-19 | End: 2019-11-07

## 2019-11-07 DIAGNOSIS — J44.9 CHRONIC OBSTRUCTIVE PULMONARY DISEASE, UNSPECIFIED COPD TYPE (HCC): ICD-10-CM

## 2019-11-07 RX ORDER — TIOTROPIUM BROMIDE 18 UG/1
18 CAPSULE ORAL; RESPIRATORY (INHALATION) DAILY
Qty: 30 CAP | Refills: 3 | Status: SHIPPED | OUTPATIENT
Start: 2019-11-07 | End: 2020-03-02

## 2019-11-07 RX ORDER — TIOTROPIUM BROMIDE 18 UG/1
CAPSULE ORAL; RESPIRATORY (INHALATION)
Status: CANCELLED | OUTPATIENT
Start: 2019-11-07

## 2019-11-21 ENCOUNTER — HOSPITAL ENCOUNTER (OUTPATIENT)
Dept: LAB | Facility: MEDICAL CENTER | Age: 84
End: 2019-11-21
Attending: FAMILY MEDICINE
Payer: MEDICARE

## 2019-11-21 ENCOUNTER — OFFICE VISIT (OUTPATIENT)
Dept: MEDICAL GROUP | Facility: PHYSICIAN GROUP | Age: 84
End: 2019-11-21
Payer: MEDICARE

## 2019-11-21 VITALS
OXYGEN SATURATION: 97 % | RESPIRATION RATE: 18 BRPM | BODY MASS INDEX: 16.93 KG/M2 | DIASTOLIC BLOOD PRESSURE: 86 MMHG | HEIGHT: 62 IN | WEIGHT: 92 LBS | TEMPERATURE: 98 F | SYSTOLIC BLOOD PRESSURE: 124 MMHG | HEART RATE: 80 BPM

## 2019-11-21 DIAGNOSIS — D69.6 THROMBOCYTOPENIA (HCC): ICD-10-CM

## 2019-11-21 DIAGNOSIS — N18.30 STAGE 3 CHRONIC KIDNEY DISEASE (HCC): ICD-10-CM

## 2019-11-21 DIAGNOSIS — E03.9 HYPOTHYROIDISM, UNSPECIFIED TYPE: ICD-10-CM

## 2019-11-21 DIAGNOSIS — R09.82 POST-NASAL DRIP: ICD-10-CM

## 2019-11-21 DIAGNOSIS — M81.0 AGE-RELATED OSTEOPOROSIS WITHOUT CURRENT PATHOLOGICAL FRACTURE: ICD-10-CM

## 2019-11-21 DIAGNOSIS — Z23 NEED FOR VACCINATION: ICD-10-CM

## 2019-11-21 DIAGNOSIS — E78.2 MIXED HYPERLIPIDEMIA: ICD-10-CM

## 2019-11-21 DIAGNOSIS — S32.040S COMPRESSION FRACTURE OF L4 VERTEBRA, SEQUELA: ICD-10-CM

## 2019-11-21 DIAGNOSIS — K59.01 SLOW TRANSIT CONSTIPATION: ICD-10-CM

## 2019-11-21 DIAGNOSIS — I71.9 AORTIC ANEURYSM WITHOUT RUPTURE, UNSPECIFIED PORTION OF AORTA (HCC): ICD-10-CM

## 2019-11-21 LAB
ANION GAP SERPL CALC-SCNC: 9 MMOL/L (ref 0–11.9)
BUN SERPL-MCNC: 18 MG/DL (ref 8–22)
CALCIUM SERPL-MCNC: 8.7 MG/DL (ref 8.5–10.5)
CHLORIDE SERPL-SCNC: 102 MMOL/L (ref 96–112)
CO2 SERPL-SCNC: 25 MMOL/L (ref 20–33)
CREAT SERPL-MCNC: 0.88 MG/DL (ref 0.5–1.4)
ERYTHROCYTE [DISTWIDTH] IN BLOOD BY AUTOMATED COUNT: 50.6 FL (ref 35.9–50)
GLUCOSE SERPL-MCNC: 80 MG/DL (ref 65–99)
HCT VFR BLD AUTO: 40.9 % (ref 37–47)
HGB BLD-MCNC: 13.6 G/DL (ref 12–16)
MCH RBC QN AUTO: 31.9 PG (ref 27–33)
MCHC RBC AUTO-ENTMCNC: 33.3 G/DL (ref 33.6–35)
MCV RBC AUTO: 95.8 FL (ref 81.4–97.8)
PLATELET # BLD AUTO: 169 K/UL (ref 164–446)
PMV BLD AUTO: 10.4 FL (ref 9–12.9)
POTASSIUM SERPL-SCNC: 3.8 MMOL/L (ref 3.6–5.5)
RBC # BLD AUTO: 4.27 M/UL (ref 4.2–5.4)
SODIUM SERPL-SCNC: 136 MMOL/L (ref 135–145)
T4 FREE SERPL-MCNC: 1.22 NG/DL (ref 0.53–1.43)
TSH SERPL DL<=0.005 MIU/L-ACNC: 10.81 UIU/ML (ref 0.38–5.33)
WBC # BLD AUTO: 6.2 K/UL (ref 4.8–10.8)

## 2019-11-21 PROCEDURE — 99203 OFFICE O/P NEW LOW 30 MIN: CPT | Mod: 25 | Performed by: FAMILY MEDICINE

## 2019-11-21 PROCEDURE — 85027 COMPLETE CBC AUTOMATED: CPT

## 2019-11-21 PROCEDURE — G0008 ADMIN INFLUENZA VIRUS VAC: HCPCS | Performed by: FAMILY MEDICINE

## 2019-11-21 PROCEDURE — 84439 ASSAY OF FREE THYROXINE: CPT

## 2019-11-21 PROCEDURE — 36415 COLL VENOUS BLD VENIPUNCTURE: CPT

## 2019-11-21 PROCEDURE — 80048 BASIC METABOLIC PNL TOTAL CA: CPT

## 2019-11-21 PROCEDURE — 90662 IIV NO PRSV INCREASED AG IM: CPT | Performed by: FAMILY MEDICINE

## 2019-11-21 PROCEDURE — 84443 ASSAY THYROID STIM HORMONE: CPT

## 2019-11-21 RX ORDER — PANTOPRAZOLE SODIUM 40 MG/1
40 TABLET, DELAYED RELEASE ORAL
Qty: 90 TAB | Refills: 3 | Status: SHIPPED | OUTPATIENT
Start: 2019-11-21 | End: 2020-10-29 | Stop reason: SDUPTHER

## 2019-11-21 RX ORDER — LEVOTHYROXINE SODIUM 112 UG/1
112 TABLET ORAL
Qty: 90 TAB | Refills: 3 | Status: SHIPPED | OUTPATIENT
Start: 2019-11-21 | End: 2020-05-27 | Stop reason: SDUPTHER

## 2019-11-21 RX ORDER — ALENDRONATE SODIUM 70 MG/1
70 TABLET ORAL
Qty: 12 TAB | Refills: 3 | Status: SHIPPED | OUTPATIENT
Start: 2019-11-21 | End: 2020-10-29 | Stop reason: SDUPTHER

## 2019-11-21 RX ORDER — ATORVASTATIN CALCIUM 40 MG/1
40 TABLET, FILM COATED ORAL
Qty: 90 TAB | Refills: 3 | Status: SHIPPED | OUTPATIENT
Start: 2019-11-21 | End: 2019-11-22 | Stop reason: SDUPTHER

## 2019-11-21 RX ORDER — FLUTICASONE PROPIONATE 50 MCG
1 SPRAY, SUSPENSION (ML) NASAL DAILY
Qty: 16 G | Refills: 11 | Status: ON HOLD | OUTPATIENT
Start: 2019-11-21 | End: 2020-10-16

## 2019-11-21 ASSESSMENT — PATIENT HEALTH QUESTIONNAIRE - PHQ9: CLINICAL INTERPRETATION OF PHQ2 SCORE: 0

## 2019-11-21 NOTE — PATIENT INSTRUCTIONS
Please call Dr. Bautista's office at  [phone]  You are supposed to have an Ultrasound in December and then have a follow up visit with him.       For constipation:  We recommend a twice daily metamucil supplement. Use this everyday.  Then, as needed we recommend the miralax once daily as needed.

## 2019-11-21 NOTE — PROGRESS NOTES
cc: Hypothyroidism      Subjective:     Mary Murrieta is a 86 y.o. female presenting for the following:     Patient presents with her daughter today.    Patient does have a history of hypothyroidism but she is not sure if she has been taking her Synthroid daily. Denies feeling hot/cold, diarrhea, palpitations, racing heart, weight gain/loss, or thinning hair.    AAA: Chronic problem for this patient.  She has been monitored by vascular surgery but she does not remember when she is to return to them.  She does not have any pain in her abdomen, lightheadedness, chest pain.    Patient with compression fracture and lumbar vertebra in March 2018.  She was started on Fosamax for osteoporosis at that time.  She has not had any other fractures since then.  She does have some chronic pain in the back, worse if she is very active that day.  She does not have any new pain or new symptoms.  She has been taking Fosamax without side effect she thinks she ran out a few weeks ago.    Review of systems:  All others reviewed and are negative.       Current Outpatient Medications:   •  fluticasone (FLONASE) 50 MCG/ACT nasal spray, Spray 1 Spray in nose every day., Disp: 16 g, Rfl: 11  •  pantoprazole (PROTONIX) 40 MG Tablet Delayed Response, Take 1 Tab by mouth every day., Disp: 90 Tab, Rfl: 3  •  alendronate (FOSAMAX) 70 MG Tab, Take 1 Tab by mouth every 7 days., Disp: 12 Tab, Rfl: 3  •  levothyroxine (SYNTHROID) 112 MCG Tab, Take 1 Tab by mouth Every morning on an empty stomach., Disp: 90 Tab, Rfl: 3  •  Fluticasone-Umeclidin-Vilant 100-62.5-25 MCG/INH AEROSOL POWDER, BREATH ACTIVATED, Inhale 1 Puff by mouth every day., Disp: 3 Each, Rfl: 1  •  albuterol 108 (90 Base) MCG/ACT Aero Soln inhalation aerosol, Inhale 2 Puffs by mouth every 6 hours as needed for Shortness of Breath., Disp: 8.5 g, Rfl: 3  •  Nutritional Supplements (VITAMIN D BOOSTER PO), Take 1 Tab by mouth every day., Disp: , Rfl:   •  aspirin EC (ECOTRIN) 81 MG  "Tablet Delayed Response, Take 81 mg by mouth every day., Disp: , Rfl:   •  atorvastatin (LIPITOR) 40 MG Tab, Take 1 Tab by mouth every day., Disp: 100 Tab, Rfl: 3  •  tiotropium (SPIRIVA HANDIHALER) 18 MCG Cap, Inhale 1 Cap by mouth every day., Disp: 30 Cap, Rfl: 3    Allergies, past medical history, past surgical history, family history, social history reviewed and updated    Objective:     Vitals: /86 (BP Location: Right arm, Patient Position: Sitting, BP Cuff Size: Adult)   Pulse 80   Temp 36.7 °C (98 °F) (Temporal)   Resp 18   Ht 1.575 m (5' 2\")   Wt 41.7 kg (92 lb)   LMP 03/03/1970 (Approximate)   SpO2 97%   BMI 16.83 kg/m²   General: Alert, pleasant, NAD  HEENT: Normocephalic.   EOMI, no icterus or pallor.  Conjunctivae and lids normal. External ears normal. Oropharynx non-erythematous, mucous membranes moist.    Neck supple.  No thyromegaly or masses palpated. No cervical or supraclavicular lymphadenopathy.  Heart: Regular rate and rhythm.   Respiratory: Normal respiratory effort.  Clear to auscultation bilaterally.  Abdomen: Non-distended, soft  Skin: Warm, dry, no rashes.  Musculoskeletal: Gait is normal.  Moves all extremities well.  Extremities: No leg edema.    Neurological:  CN2-12 grossly intact  Psych:  Affect is normal,  grooming is appropriate, memory is poor.    Assessment/Plan:     Mary was seen today for establish care.    Diagnoses and all orders for this visit:    Post-nasal drip: Chronic problem.  Suggest Flonase daily.    Age-related osteoporosis without current pathological fracture/Compression fracture of L4 vertebra, sequela: We will refill Fosamax.  Discussed common side effects such as GI upset but also increased risk of GI ulcer and also rare risk of jaw necrosis.  -     alendronate (FOSAMAX) 70 MG Tab; Take 1 Tab by mouth every 7 days.    Hypothyroidism, unspecified type patient is unsure if she has been taking her medication.  Will refill this now and check blood " tests.  -     levothyroxine (SYNTHROID) 112 MCG Tab; Take 1 Tab by mouth Every morning on an empty stomach.  -     TSH; Future  -     FREE THYROXINE; Future    Stage 3 chronic kidney disease (HCC): This has been stable.  Check this at least twice yearly.  -     Basic Metabolic Panel; Future    Mixed hyperlipidemia patient has been taking Lipitor without side effects.  We will continue this and will monitor pelvic function.  -     atorvastatin (LIPITOR) 40 MG Tab; Take 1 Tab by mouth every day.    Thrombocytopenia (HCC): Mild problem.  Will check for stability.  -     CBC WITHOUT DIFFERENTIAL; Future    Slow transit constipation: Chronic problem for this patient.  Suggest twice daily Metamucil with MiraLAX as needed.    Need for vaccination  -     Influenza Vaccine, High Dose (65+ Only)    AAA: As per note from vascular surgery patient was supposed to have a follow-up ultrasound in December and then follow-up in our office.  Daughter given phone number for their office so she can schedule these.    Other orders  -     fluticasone (FLONASE) 50 MCG/ACT nasal spray; Spray 1 Spray in nose every day.  -     pantoprazole (PROTONIX) 40 MG Tablet Delayed Response; Take 1 Tab by mouth every day.        Return in about 3 months (around 2/21/2020), or if symptoms worsen or fail to improve.

## 2019-11-22 DIAGNOSIS — E78.2 MIXED HYPERLIPIDEMIA: ICD-10-CM

## 2019-11-22 DIAGNOSIS — E03.9 HYPOTHYROIDISM, UNSPECIFIED TYPE: ICD-10-CM

## 2019-11-22 RX ORDER — ATORVASTATIN CALCIUM 40 MG/1
40 TABLET, FILM COATED ORAL
Qty: 100 TAB | Refills: 3 | Status: SHIPPED | OUTPATIENT
Start: 2019-11-22 | End: 2020-05-28 | Stop reason: SDUPTHER

## 2019-11-22 NOTE — TELEPHONE ENCOUNTER
Was the patient seen in the last year in this department? Yes    Does patient have an active prescription for medications requested? Yes    Received Request Via: Pharmacy      Pt met protocol?: Yes   Pt last ov 11/2019 needs 100 day supply    Lab Results   Component Value Date/Time    CHOLSTRLTOT 154 03/04/2019 0355    TRIGLYCERIDE 56 03/04/2019 0355    HDL 63 03/04/2019 0355    LDL 80 03/04/2019 0355

## 2019-12-02 ENCOUNTER — OFFICE VISIT (OUTPATIENT)
Dept: URGENT CARE | Facility: PHYSICIAN GROUP | Age: 84
End: 2019-12-02
Payer: MEDICARE

## 2019-12-02 VITALS
WEIGHT: 90 LBS | HEART RATE: 85 BPM | OXYGEN SATURATION: 96 % | SYSTOLIC BLOOD PRESSURE: 130 MMHG | BODY MASS INDEX: 16.46 KG/M2 | TEMPERATURE: 98.3 F | RESPIRATION RATE: 14 BRPM | DIASTOLIC BLOOD PRESSURE: 90 MMHG

## 2019-12-02 DIAGNOSIS — L03.116 CELLULITIS OF LEFT LOWER EXTREMITY: ICD-10-CM

## 2019-12-02 DIAGNOSIS — M79.89 SWELLING OF LEFT LOWER EXTREMITY: ICD-10-CM

## 2019-12-02 PROCEDURE — 99214 OFFICE O/P EST MOD 30 MIN: CPT | Performed by: NURSE PRACTITIONER

## 2019-12-02 RX ORDER — CEPHALEXIN 500 MG/1
500 CAPSULE ORAL 4 TIMES DAILY
Qty: 28 CAP | Refills: 0 | Status: SHIPPED | OUTPATIENT
Start: 2019-12-02 | End: 2019-12-09

## 2019-12-02 ASSESSMENT — ENCOUNTER SYMPTOMS
CONSTITUTIONAL NEGATIVE: 1
FEVER: 0
CARDIOVASCULAR NEGATIVE: 1
FOCAL WEAKNESS: 0
TINGLING: 0
NEUROLOGICAL NEGATIVE: 1
CHILLS: 0
RESPIRATORY NEGATIVE: 1
SENSORY CHANGE: 0

## 2019-12-02 NOTE — PROGRESS NOTES
Subjective:     Mary Murrieta is a 86 y.o. female who presents for Leg Laceration (hit left leg on corner of coffee table a week ago,red,swollen)       Other   This is a new problem. The problem has been gradually worsening. Pertinent negatives include no chills or fever.     Patient reports that about a week ago she accidentally struck the front of her left lower leg at the corner of a coffee table.  Had a triangular shaped skin tear.  Patient reports she did not get medical treatment.  She has been treating the wound with topical antibiotic ointment.  She reports swelling, redness, and bruising surrounding the laceration.  Pain is mild.  Denies numbness or tingling distally.  Tdap last received 8/27/2019 per chart review.    PMH:  has a past medical history of Aortic aneurysm without rupture ([facility]), Atherosclerosis of aorta ([facility]) (3/27/2019), Cataract, Compression fracture of fourth lumbar vertebra ([facility]) (3/28/2018), Dental disorder (03/20/2018), Emphysema of lung ([facility]), Gastroesophageal reflux disease without esophagitis (5/11/2018), Hiatus hernia syndrome, Hypothyroidism (2/8/2016), Mixed hyperlipidemia (12/7/2016), and Protein-calorie malnutrition ([facility]) (3/28/2018).    MEDS:   Current Outpatient Medications:   •  cephALEXin (KEFLEX) 500 MG Cap, Take 1 Cap by mouth 4 times a day for 7 days., Disp: 28 Cap, Rfl: 0  •  atorvastatin (LIPITOR) 40 MG Tab, Take 1 Tab by mouth every day., Disp: 100 Tab, Rfl: 3  •  pantoprazole (PROTONIX) 40 MG Tablet Delayed Response, Take 1 Tab by mouth every day., Disp: 90 Tab, Rfl: 3  •  alendronate (FOSAMAX) 70 MG Tab, Take 1 Tab by mouth every 7 days., Disp: 12 Tab, Rfl: 3  •  levothyroxine (SYNTHROID) 112 MCG Tab, Take 1 Tab by mouth Every morning on an empty stomach., Disp: 90 Tab, Rfl: 3  •  tiotropium (SPIRIVA HANDIHALER) 18 MCG Cap, Inhale 1 Cap by mouth every day., Disp: 30 Cap, Rfl: 3  •  albuterol 108 (90 Base) MCG/ACT Aero Soln inhalation aerosol, Inhale 2 Puffs by mouth  every 6 hours as needed for Shortness of Breath., Disp: 8.5 g, Rfl: 3  •  Nutritional Supplements (VITAMIN D BOOSTER PO), Take 1 Tab by mouth every day., Disp: , Rfl:   •  aspirin EC (ECOTRIN) 81 MG Tablet Delayed Response, Take 81 mg by mouth every day., Disp: , Rfl:   •  fluticasone (FLONASE) 50 MCG/ACT nasal spray, Spray 1 Spray in nose every day., Disp: 16 g, Rfl: 11  •  Fluticasone-Umeclidin-Vilant 100-62.5-25 MCG/INH AEROSOL POWDER, BREATH ACTIVATED, Inhale 1 Puff by mouth every day., Disp: 3 Each, Rfl: 1    ALLERGIES: No Known Allergies    SURGHX:   Past Surgical History:   Procedure Laterality Date   • ABDOMINAL HYSTERECTOMY TOTAL  1986   • TONSILLECTOMY  1938   • THYROIDECTOMY      1976     SOCHX:  reports that she has been smoking cigarettes. She has a 50.00 pack-year smoking history. She has never used smokeless tobacco. She reports current alcohol use of about 0.6 oz of alcohol per week. She reports that she does not use drugs.     FH: Reviewed with patient, not pertinent to this visit.    Review of Systems   Constitutional: Negative.  Negative for chills, fever and malaise/fatigue.   Respiratory: Negative.    Cardiovascular: Negative.    Musculoskeletal:        Left lower leg skin tear, mild surrounding swelling, redness, bruising   Neurological: Negative.  Negative for tingling, sensory change and focal weakness.   All other systems reviewed and are negative.    Objective:     /90 (BP Location: Left arm, Patient Position: Sitting, BP Cuff Size: Adult)   Pulse 85   Temp 36.8 °C (98.3 °F) (Temporal)   Resp 14   Wt 40.8 kg (90 lb)   LMP 03/03/1970 (Approximate)   SpO2 96%   BMI 16.46 kg/m²     Physical Exam  Vitals signs reviewed.   Constitutional:       General: She is not in acute distress.     Appearance: She is well-developed. She is not toxic-appearing or diaphoretic.   HENT:      Head: Normocephalic.      Right Ear: External ear normal.      Left Ear: External ear normal.      Nose:  Nose normal.   Eyes:      Extraocular Movements: Extraocular movements intact.      Conjunctiva/sclera: Conjunctivae normal.   Neck:      Musculoskeletal: Normal range of motion.   Cardiovascular:      Rate and Rhythm: Normal rate.      Pulses: Normal pulses.   Pulmonary:      Effort: Pulmonary effort is normal. No respiratory distress.   Musculoskeletal: Normal range of motion.         General: No deformity.      Left lower leg: She exhibits laceration (Approx 6 cm triangular-shaped laceration at left anterior lower leg with mild surrounding edema, erythema, bruising, and mild TTP, NVI distally). She exhibits no bony tenderness and no deformity.   Skin:     General: Skin is warm and dry.      Coloration: Skin is not pale.   Neurological:      Mental Status: She is alert and oriented to person, place, and time.      Sensory: No sensory deficit.      Coordination: Coordination normal.      Gait: Gait normal.   Psychiatric:         Behavior: Behavior normal. Behavior is cooperative.          Assessment/Plan:     1. Cellulitis of left lower extremity  - cephALEXin (KEFLEX) 500 MG Cap; Take 1 Cap by mouth 4 times a day for 7 days.  Dispense: 28 Cap; Refill: 0    2. Swelling of left lower extremity    Rx as above sent electronically.    Advised on wound care.    Patient advised to monitor for signs of worsening infection including, but not limited to, increased redness, warmth, pain, swelling, discharge, or fever.    Discussed close monitoring and supportive measures including increasing fluids and rest as well as OTC symptom management including acetaminophen and/or ibuprofen PRN pain and/or fever.     Vital signs stable, afebrile, asymptomatic, no acute distress.    Ultrasound of left lower extremity unavailable until 3 days from today, canceled.  Warning signs reviewed. Strict return/ER precautions advised.    Patient advised to: Return for 1) Symptoms don't improve or worsen, or go to ER, 2) Follow up with primary  care in 7-10 days.    Differential diagnosis, natural history, supportive care, and indications for immediate follow-up discussed. All questions answered. Patient agrees with the plan of care.

## 2019-12-09 ENCOUNTER — OFFICE VISIT (OUTPATIENT)
Dept: MEDICAL GROUP | Facility: PHYSICIAN GROUP | Age: 84
End: 2019-12-09
Payer: MEDICARE

## 2019-12-09 ENCOUNTER — HOSPITAL ENCOUNTER (OUTPATIENT)
Facility: MEDICAL CENTER | Age: 84
End: 2019-12-09
Attending: FAMILY MEDICINE
Payer: MEDICARE

## 2019-12-09 VITALS
TEMPERATURE: 98 F | BODY MASS INDEX: 16.56 KG/M2 | WEIGHT: 90 LBS | HEART RATE: 78 BPM | OXYGEN SATURATION: 92 % | HEIGHT: 62 IN | RESPIRATION RATE: 18 BRPM | SYSTOLIC BLOOD PRESSURE: 120 MMHG | DIASTOLIC BLOOD PRESSURE: 70 MMHG

## 2019-12-09 DIAGNOSIS — L03.116 CELLULITIS OF LEFT LOWER EXTREMITY: ICD-10-CM

## 2019-12-09 PROCEDURE — 87205 SMEAR GRAM STAIN: CPT

## 2019-12-09 PROCEDURE — 87070 CULTURE OTHR SPECIMN AEROBIC: CPT

## 2019-12-09 PROCEDURE — 99214 OFFICE O/P EST MOD 30 MIN: CPT | Performed by: FAMILY MEDICINE

## 2019-12-09 RX ORDER — SULFAMETHOXAZOLE AND TRIMETHOPRIM 800; 160 MG/1; MG/1
1 TABLET ORAL 2 TIMES DAILY
Qty: 14 TAB | Refills: 0 | Status: SHIPPED | OUTPATIENT
Start: 2019-12-09 | End: 2019-12-12 | Stop reason: SDUPTHER

## 2019-12-09 NOTE — PROGRESS NOTES
cc: Worsening wound infection      Subjective:     Mary Murrieta is a 86 y.o. female presenting for the following:     Patient was seen 6 days ago in urgent care for cellulitis surrounding a wound on the left lower extremity.  She has been taking Keflex as prescribed without side effects but she feels that the redness has slightly worsened.  The pain is no worse and she does not have any systemic illness-no fever/chills, nausea, lightheadedness, myalgias, fatigue.  But the area of redness has spread lower down the leg.    Review of systems:  All others reviewed and are negative.       Current Outpatient Medications:   •  sulfamethoxazole-trimethoprim (BACTRIM DS) 800-160 MG tablet, Take 1 Tab by mouth 2 times a day for 7 days., Disp: 14 Tab, Rfl: 0  •  atorvastatin (LIPITOR) 40 MG Tab, Take 1 Tab by mouth every day., Disp: 100 Tab, Rfl: 3  •  fluticasone (FLONASE) 50 MCG/ACT nasal spray, Spray 1 Spray in nose every day., Disp: 16 g, Rfl: 11  •  pantoprazole (PROTONIX) 40 MG Tablet Delayed Response, Take 1 Tab by mouth every day., Disp: 90 Tab, Rfl: 3  •  alendronate (FOSAMAX) 70 MG Tab, Take 1 Tab by mouth every 7 days., Disp: 12 Tab, Rfl: 3  •  levothyroxine (SYNTHROID) 112 MCG Tab, Take 1 Tab by mouth Every morning on an empty stomach., Disp: 90 Tab, Rfl: 3  •  tiotropium (SPIRIVA HANDIHALER) 18 MCG Cap, Inhale 1 Cap by mouth every day., Disp: 30 Cap, Rfl: 3  •  Fluticasone-Umeclidin-Vilant 100-62.5-25 MCG/INH AEROSOL POWDER, BREATH ACTIVATED, Inhale 1 Puff by mouth every day., Disp: 3 Each, Rfl: 1  •  albuterol 108 (90 Base) MCG/ACT Aero Soln inhalation aerosol, Inhale 2 Puffs by mouth every 6 hours as needed for Shortness of Breath., Disp: 8.5 g, Rfl: 3  •  Nutritional Supplements (VITAMIN D BOOSTER PO), Take 1 Tab by mouth every day., Disp: , Rfl:   •  aspirin EC (ECOTRIN) 81 MG Tablet Delayed Response, Take 81 mg by mouth every day., Disp: , Rfl:     Allergies, past medical history, past surgical history,  "family history, social history reviewed and updated    Objective:     Vitals: /70 (BP Location: Right arm, Patient Position: Sitting, BP Cuff Size: Adult)   Pulse 78   Temp 36.7 °C (98 °F) (Temporal)   Resp 18   Ht 1.575 m (5' 2\")   Wt 40.8 kg (90 lb)   LMP 03/03/1970 (Approximate)   SpO2 92%   BMI 16.46 kg/m²   General: Alert, pleasant, NAD  HEENT: Normocephalic.   EOMI, no icterus or pallor.    Musculoskeletal: Gait is normal.  Moves all extremities well.  Extremities: Anterior left leg with triangle shaped wound with black scab.  No purulence or discharge.  Surrounding skin with erythema and mild tenderness down to 4 cm above ankle.  Ankle without edema.  No posterior leg discoloration, swelling, tenderness.  Neurological:  CN2-12 grossly intact  Psych:  Affect is normal, grooming is appropriate.    Assessment/Plan:     Mary was seen today for wound check.    Diagnoses and all orders for this visit:    Cellulitis of left lower extremity: Cellulitis surrounding wound not improved with Keflex.  Very low suspicion of DVT as redness is only surrounding wound and no edema along ankle or the rest of the leg.  Attempted to collect wound culture today but no obvious purulence.  Will switch antibiotic to Bactrim for MRSA coverage and will make short term follow-up visit.  Patient warned to report to ER immediately if any systemic illness including malaise, nausea, fever.  Explained signs of DVT/PE but that I have a very low suspicion currently.  Patient warned of common side effects of antibiotic including headache, nausea.  To discontinue immediately if any rash and to seek medical treatment immediately if any swelling in mouth.  Also warned of small risk of C. difficile colitis.  -     sulfamethoxazole-trimethoprim (BACTRIM DS) 800-160 MG tablet; Take 1 Tab by mouth 2 times a day for 7 days.  -     CULTURE WOUND W/ GRAM STAIN; Future        Return in about 3 days (around 12/12/2019), or if symptoms worsen " or fail to improve.

## 2019-12-10 LAB
GRAM STN SPEC: NORMAL
SIGNIFICANT IND 70042: NORMAL
SITE SITE: NORMAL
SOURCE SOURCE: NORMAL

## 2019-12-12 ENCOUNTER — HOSPITAL ENCOUNTER (OUTPATIENT)
Dept: LAB | Facility: MEDICAL CENTER | Age: 84
End: 2019-12-12
Attending: FAMILY MEDICINE
Payer: MEDICARE

## 2019-12-12 ENCOUNTER — OFFICE VISIT (OUTPATIENT)
Dept: MEDICAL GROUP | Facility: PHYSICIAN GROUP | Age: 84
End: 2019-12-12
Payer: MEDICARE

## 2019-12-12 VITALS
TEMPERATURE: 97.2 F | SYSTOLIC BLOOD PRESSURE: 110 MMHG | HEART RATE: 93 BPM | DIASTOLIC BLOOD PRESSURE: 78 MMHG | HEIGHT: 62 IN | OXYGEN SATURATION: 94 % | BODY MASS INDEX: 16.93 KG/M2 | WEIGHT: 92 LBS

## 2019-12-12 DIAGNOSIS — N18.30 STAGE 3 CHRONIC KIDNEY DISEASE (HCC): ICD-10-CM

## 2019-12-12 DIAGNOSIS — L03.116 CELLULITIS OF LEFT LOWER EXTREMITY: ICD-10-CM

## 2019-12-12 DIAGNOSIS — E03.9 HYPOTHYROIDISM, UNSPECIFIED TYPE: ICD-10-CM

## 2019-12-12 LAB
BACTERIA WND AEROBE CULT: NORMAL
GRAM STN SPEC: NORMAL
SIGNIFICANT IND 70042: NORMAL
SITE SITE: NORMAL
SOURCE SOURCE: NORMAL

## 2019-12-12 PROCEDURE — 84443 ASSAY THYROID STIM HORMONE: CPT

## 2019-12-12 PROCEDURE — 36415 COLL VENOUS BLD VENIPUNCTURE: CPT

## 2019-12-12 PROCEDURE — 99213 OFFICE O/P EST LOW 20 MIN: CPT | Performed by: FAMILY MEDICINE

## 2019-12-12 PROCEDURE — 84439 ASSAY OF FREE THYROXINE: CPT

## 2019-12-12 PROCEDURE — 80048 BASIC METABOLIC PNL TOTAL CA: CPT

## 2019-12-12 RX ORDER — GABAPENTIN 100 MG/1
100 CAPSULE ORAL
Refills: 3 | COMMUNITY
Start: 2019-11-27 | End: 2020-05-13 | Stop reason: SDUPTHER

## 2019-12-12 RX ORDER — SULFAMETHOXAZOLE AND TRIMETHOPRIM 800; 160 MG/1; MG/1
1 TABLET ORAL 2 TIMES DAILY
Qty: 14 TAB | Refills: 0 | Status: SHIPPED | OUTPATIENT
Start: 2019-12-12 | End: 2019-12-19

## 2019-12-12 NOTE — PROGRESS NOTES
cc: Cellulitis/wound check      Subjective:     Mary Murrieta is a 86 y.o. female presenting for the following:     Patient diagnosed with left lower extremity cellulitis roughly 2 weeks ago.  Did complete a course of Keflex but was seen again in clinic as this was not effective.  Was started on Bactrim as an alternative to cover MRSA on her last visit.  Wound culture did not grow out organism.    She has been taking the Bactrim without side effect.  Feels well on this.  She never did have any fevers or systemic illness and this remains true.  She feels well and is eating and drinking normally.  She does feel that the leg is less swollen and less tender to palpation with this antibiotic.    Review of systems:  All others reviewed and are negative.       Current Outpatient Medications:   •  sulfamethoxazole-trimethoprim (BACTRIM DS) 800-160 MG tablet, Take 1 Tab by mouth 2 times a day for 7 days., Disp: 14 Tab, Rfl: 0  •  gabapentin (NEURONTIN) 100 MG Cap, Take 100 mg by mouth every day., Disp: , Rfl: 3  •  atorvastatin (LIPITOR) 40 MG Tab, Take 1 Tab by mouth every day., Disp: 100 Tab, Rfl: 3  •  fluticasone (FLONASE) 50 MCG/ACT nasal spray, Spray 1 Spray in nose every day., Disp: 16 g, Rfl: 11  •  pantoprazole (PROTONIX) 40 MG Tablet Delayed Response, Take 1 Tab by mouth every day., Disp: 90 Tab, Rfl: 3  •  alendronate (FOSAMAX) 70 MG Tab, Take 1 Tab by mouth every 7 days., Disp: 12 Tab, Rfl: 3  •  levothyroxine (SYNTHROID) 112 MCG Tab, Take 1 Tab by mouth Every morning on an empty stomach., Disp: 90 Tab, Rfl: 3  •  tiotropium (SPIRIVA HANDIHALER) 18 MCG Cap, Inhale 1 Cap by mouth every day., Disp: 30 Cap, Rfl: 3  •  Fluticasone-Umeclidin-Vilant 100-62.5-25 MCG/INH AEROSOL POWDER, BREATH ACTIVATED, Inhale 1 Puff by mouth every day., Disp: 3 Each, Rfl: 1  •  albuterol 108 (90 Base) MCG/ACT Aero Soln inhalation aerosol, Inhale 2 Puffs by mouth every 6 hours as needed for Shortness of Breath., Disp: 8.5 g, Rfl:  "3  •  Nutritional Supplements (VITAMIN D BOOSTER PO), Take 1 Tab by mouth every day., Disp: , Rfl:   •  aspirin EC (ECOTRIN) 81 MG Tablet Delayed Response, Take 81 mg by mouth every day., Disp: , Rfl:     Allergies, past medical history, past surgical history, family history, social history reviewed and updated    Objective:     Vitals: /78 (BP Location: Left arm, Patient Position: Sitting, BP Cuff Size: Child)   Pulse 93   Temp 36.2 °C (97.2 °F)   Ht 1.575 m (5' 2\")   Wt 41.7 kg (92 lb)   LMP 03/03/1970 (Approximate)   SpO2 94%   BMI 16.83 kg/m²   General: Alert, pleasant, NAD  HEENT: Normocephalic.   EOMI, no icterus or pallor.    Heart: Regular rate and rhythm.    Respiratory: Normal respiratory effort.   Extremities: No leg edema.  Left leg with triangular wound and surrounding erythema.  Erythema was previously to ankle and now receding towards wound.  Mildly tender to palpation.    Assessment/Plan:     Mary was seen today for follow-up.    Diagnoses and all orders for this visit:    Cellulitis of left lower extremity: Seemingly improving with Bactrim after previously failing Keflex.  Will extend this for a full 2-week course.  We will also check renal function as patient does have a history of chronic renal disease.  Patient to return to clinic if any worsening, or any systemic illness including fevers, fatigue, nausea.  -     sulfamethoxazole-trimethoprim (BACTRIM DS) 800-160 MG tablet; Take 1 Tab by mouth 2 times a day for 7 days.    Stage 3 chronic kidney disease (HCC)  -     Basic Metabolic Panel; Future      Return if symptoms worsen or fail to improve.  "

## 2019-12-13 LAB
ANION GAP SERPL CALC-SCNC: 9 MMOL/L (ref 0–11.9)
BUN SERPL-MCNC: 15 MG/DL (ref 8–22)
CALCIUM SERPL-MCNC: 9.2 MG/DL (ref 8.5–10.5)
CHLORIDE SERPL-SCNC: 103 MMOL/L (ref 96–112)
CO2 SERPL-SCNC: 24 MMOL/L (ref 20–33)
CREAT SERPL-MCNC: 1.14 MG/DL (ref 0.5–1.4)
FASTING STATUS PATIENT QL REPORTED: NORMAL
GLUCOSE SERPL-MCNC: 73 MG/DL (ref 65–99)
POTASSIUM SERPL-SCNC: 4.3 MMOL/L (ref 3.6–5.5)
SODIUM SERPL-SCNC: 136 MMOL/L (ref 135–145)
T4 FREE SERPL-MCNC: 1.51 NG/DL (ref 0.53–1.43)
TSH SERPL DL<=0.005 MIU/L-ACNC: 6 UIU/ML (ref 0.38–5.33)

## 2019-12-16 DIAGNOSIS — N18.30 STAGE 3 CHRONIC KIDNEY DISEASE (HCC): ICD-10-CM

## 2019-12-16 DIAGNOSIS — E03.9 HYPOTHYROIDISM, UNSPECIFIED TYPE: ICD-10-CM

## 2020-01-15 NOTE — TELEPHONE ENCOUNTER
Was the patient seen in the last year in this department? Yes    Does patient have an active prescription for medications requested? No     Received Request Via: Pharmacy      Pt met protocol?: Yes    LAST OV 12/12/2019, MED HAS BEEN D/C

## 2020-01-16 RX ORDER — BUDESONIDE AND FORMOTEROL FUMARATE DIHYDRATE 160; 4.5 UG/1; UG/1
2 AEROSOL RESPIRATORY (INHALATION) 2 TIMES DAILY
Qty: 30.6 INHALER | Refills: 0 | OUTPATIENT
Start: 2020-01-16 | End: 2020-01-19

## 2020-01-30 ENCOUNTER — PATIENT OUTREACH (OUTPATIENT)
Dept: HEALTH INFORMATION MANAGEMENT | Facility: OTHER | Age: 85
End: 2020-01-30

## 2020-01-30 SDOH — ECONOMIC STABILITY: FOOD INSECURITY: WITHIN THE PAST 12 MONTHS, YOU WORRIED THAT YOUR FOOD WOULD RUN OUT BEFORE YOU GOT MONEY TO BUY MORE.: NEVER TRUE

## 2020-01-30 SDOH — ECONOMIC STABILITY: FOOD INSECURITY: WITHIN THE PAST 12 MONTHS, THE FOOD YOU BOUGHT JUST DIDN'T LAST AND YOU DIDN'T HAVE MONEY TO GET MORE.: NEVER TRUE

## 2020-01-30 SDOH — ECONOMIC STABILITY: TRANSPORTATION INSECURITY
IN THE PAST 12 MONTHS, HAS THE LACK OF TRANSPORTATION KEPT YOU FROM MEDICAL APPOINTMENTS OR FROM GETTING MEDICATIONS?: NO

## 2020-01-30 SDOH — ECONOMIC STABILITY: TRANSPORTATION INSECURITY
IN THE PAST 12 MONTHS, HAS LACK OF TRANSPORTATION KEPT YOU FROM MEETINGS, WORK, OR FROM GETTING THINGS NEEDED FOR DAILY LIVING?: NO

## 2020-01-30 NOTE — PROGRESS NOTES
1. [facility] Verified: yes    2. Verify PCP: yes    3. Review and add  to Care Team: yes        5. Reviewed/Updated the following with patient:       •   Communication Preference Obtained? YES  • MyChart Activation: declined       •   E-Mail Address Obtained? NO       •   Appointment Day and Time Preferences? YES       •   Preferred Pharmacy? YES       •   Preferred Lab? YES    6. Care Gap Scheduling (Attempt to Schedule EACH Overdue Care Gap!)    Patient declined Annual Wellness Visit (AWV).

## 2020-02-26 ENCOUNTER — TELEPHONE (OUTPATIENT)
Dept: MEDICAL GROUP | Facility: PHYSICIAN GROUP | Age: 85
End: 2020-02-26

## 2020-02-27 NOTE — TELEPHONE ENCOUNTER
Future Appointments       Provider Department Center    3/2/2020 1:15 PM Danielle Ledezma M.D. [facility]        ESTABLISHED PATIENT PRE-VISIT PLANNING     Patient was NOT contacted to complete PVP.    1.  Reviewed notes from the last few office visits within the medical group: Yes    2.  If any orders were placed at last visit or intended to be done for this visit (i.e. 6 mos follow-up), do we have Results/Consult Notes?        •  Labs - Labs were not ordered at last office visit.       •  Imaging - Imaging was not ordered at last office visit.       •  Referrals - No referrals were ordered at last office visit.    3. Is this appointment scheduled as a Hospital Follow-Up? No    4.  Immunizations were updated in Epic using WebIZ?: Yes       •  Web Iz Recommendations: MMR , TD and SHINGRIX (Shingles)    5.  Patient is due for the following Health Maintenance Topics:   Health Maintenance Due   Topic Date Due   • IMM ZOSTER VACCINES (1 of 2) 12/25/1982   • Annual Wellness Visit  05/12/2019     6. Orders for overdue Health Maintenance topics pended in Pre-Charting? NO    7.  AHA (MDX) form printed for Provider? YES    8.  Patient was NOT informed to arrive 15 min prior to their scheduled appointment and bring in their medication bottles.

## 2020-03-02 ENCOUNTER — OFFICE VISIT (OUTPATIENT)
Dept: MEDICAL GROUP | Facility: PHYSICIAN GROUP | Age: 85
End: 2020-03-02
Payer: MEDICARE

## 2020-03-02 ENCOUNTER — HOSPITAL ENCOUNTER (OUTPATIENT)
Dept: LAB | Facility: MEDICAL CENTER | Age: 85
End: 2020-03-02
Attending: FAMILY MEDICINE
Payer: MEDICARE

## 2020-03-02 VITALS
SYSTOLIC BLOOD PRESSURE: 122 MMHG | BODY MASS INDEX: 16.8 KG/M2 | OXYGEN SATURATION: 95 % | HEART RATE: 96 BPM | DIASTOLIC BLOOD PRESSURE: 80 MMHG | HEIGHT: 62 IN | WEIGHT: 91.3 LBS | TEMPERATURE: 98.5 F

## 2020-03-02 DIAGNOSIS — I10 ESSENTIAL HYPERTENSION: ICD-10-CM

## 2020-03-02 DIAGNOSIS — M81.0 AGE-RELATED OSTEOPOROSIS WITHOUT CURRENT PATHOLOGICAL FRACTURE: ICD-10-CM

## 2020-03-02 DIAGNOSIS — N18.30 STAGE 3 CHRONIC KIDNEY DISEASE: ICD-10-CM

## 2020-03-02 DIAGNOSIS — R11.0 NAUSEA: ICD-10-CM

## 2020-03-02 DIAGNOSIS — E03.9 HYPOTHYROIDISM, UNSPECIFIED TYPE: ICD-10-CM

## 2020-03-02 DIAGNOSIS — J44.9 CHRONIC OBSTRUCTIVE PULMONARY DISEASE, UNSPECIFIED COPD TYPE (HCC): ICD-10-CM

## 2020-03-02 DIAGNOSIS — I71.9 AORTIC ANEURYSM WITHOUT RUPTURE, UNSPECIFIED PORTION OF AORTA (HCC): ICD-10-CM

## 2020-03-02 DIAGNOSIS — K59.01 SLOW TRANSIT CONSTIPATION: ICD-10-CM

## 2020-03-02 DIAGNOSIS — E44.0 MODERATE PROTEIN-CALORIE MALNUTRITION (HCC): ICD-10-CM

## 2020-03-02 PROBLEM — S41.119A LACERATION OF ARM: Status: RESOLVED | Noted: 2019-09-03 | Resolved: 2020-03-02

## 2020-03-02 LAB
BASOPHILS # BLD AUTO: 1.3 % (ref 0–1.8)
BASOPHILS # BLD: 0.08 K/UL (ref 0–0.12)
EOSINOPHIL # BLD AUTO: 0 K/UL (ref 0–0.51)
EOSINOPHIL NFR BLD: 0 % (ref 0–6.9)
ERYTHROCYTE [DISTWIDTH] IN BLOOD BY AUTOMATED COUNT: 51.7 FL (ref 35.9–50)
HCT VFR BLD AUTO: 41.3 % (ref 37–47)
HGB BLD-MCNC: 13.6 G/DL (ref 12–16)
IMM GRANULOCYTES # BLD AUTO: 0.02 K/UL (ref 0–0.11)
IMM GRANULOCYTES NFR BLD AUTO: 0.3 % (ref 0–0.9)
LYMPHOCYTES # BLD AUTO: 1.36 K/UL (ref 1–4.8)
LYMPHOCYTES NFR BLD: 21.5 % (ref 22–41)
MCH RBC QN AUTO: 31.1 PG (ref 27–33)
MCHC RBC AUTO-ENTMCNC: 32.9 G/DL (ref 33.6–35)
MCV RBC AUTO: 94.5 FL (ref 81.4–97.8)
MONOCYTES # BLD AUTO: 0.62 K/UL (ref 0–0.85)
MONOCYTES NFR BLD AUTO: 9.8 % (ref 0–13.4)
NEUTROPHILS # BLD AUTO: 4.26 K/UL (ref 2–7.15)
NEUTROPHILS NFR BLD: 67.1 % (ref 44–72)
NRBC # BLD AUTO: 0 K/UL
NRBC BLD-RTO: 0 /100 WBC
PLATELET # BLD AUTO: 141 K/UL (ref 164–446)
PMV BLD AUTO: 11.1 FL (ref 9–12.9)
RBC # BLD AUTO: 4.37 M/UL (ref 4.2–5.4)
WBC # BLD AUTO: 6.3 K/UL (ref 4.8–10.8)

## 2020-03-02 PROCEDURE — 84443 ASSAY THYROID STIM HORMONE: CPT

## 2020-03-02 PROCEDURE — 36415 COLL VENOUS BLD VENIPUNCTURE: CPT

## 2020-03-02 PROCEDURE — 85025 COMPLETE CBC W/AUTO DIFF WBC: CPT

## 2020-03-02 PROCEDURE — 8041 PR SCP AHA: Performed by: FAMILY MEDICINE

## 2020-03-02 PROCEDURE — 99214 OFFICE O/P EST MOD 30 MIN: CPT | Performed by: FAMILY MEDICINE

## 2020-03-02 PROCEDURE — 80053 COMPREHEN METABOLIC PANEL: CPT

## 2020-03-02 PROCEDURE — 84439 ASSAY OF FREE THYROXINE: CPT

## 2020-03-02 RX ORDER — ALBUTEROL SULFATE 90 UG/1
2 AEROSOL, METERED RESPIRATORY (INHALATION) EVERY 6 HOURS PRN
Qty: 8.5 G | Refills: 3 | Status: SHIPPED | OUTPATIENT
Start: 2020-03-02 | End: 2020-05-27 | Stop reason: SDUPTHER

## 2020-03-02 ASSESSMENT — PATIENT HEALTH QUESTIONNAIRE - PHQ9: CLINICAL INTERPRETATION OF PHQ2 SCORE: 0

## 2020-03-02 ASSESSMENT — FIBROSIS 4 INDEX: FIB4 SCORE: 2.91

## 2020-03-02 NOTE — PROGRESS NOTES
Subjective:     Mary Murrieta is a 87 y.o. female here today for medication refills.     Nausea: last night, patient did have acute onset of nausea. She was nauseated for about 8 hours.  She did not vomit but she thought she was going to.  This lasted throughout most the night.  This morning, she did have some water coffee and some bland crackers.  She is feeling very tired but she does not feel any further nausea.  She did not take her Fosamax today.    No constipation or diarrhea.  Patient has been taking mineral oil for chronic constipation for the last 2 months and she does feel well on this.    She denies any abdominal pain, blood in stool, melena, lightheadedness, shortness of breath above baseline.    COPD: Patient has been using Spiriva but does not find this very helpful.  She does not have an as needed inhaler right now.  Sometimes when she goes out in the cold air she will feel an acute onset of shortness of breath and she does not have an inhaler to use for this.  She denies any fever/chills, new cough, shortness of breath at rest.    Health Maintenance Summary                IMM ZOSTER VACCINES Overdue 12/25/1982     Annual Wellness Visit Overdue 5/12/2019      Done 5/11/2018      Patient has more history with this topic...    Annual Pulmonary Function Test / Spirometry Next Due 5/16/2020      Done 5/16/2019 PULMONARY FUNCTION TESTS    IMM DTaP/Tdap/Td Vaccine Next Due 8/27/2029      Done 8/27/2019 Imm Admin: Tdap Vaccine           Annual Health Assessment Questions:     1.  Are you currently engaging in any exercise or physical activity? No    2.  How would you describe your mood or emotional well-being today? good    3.  Have you had any falls in the last year? Yes    4.  Have you noticed any problems with your balance or had difficulty walking? Yes    5.  In the last six months have you experienced any leakage of urine? Yes    6. DPA/Advanced Directive: Patient does not have an Advanced Directive.   A packet and workshop information was given on Advanced Directives.    Current medicines (including changes today)  Current Outpatient Medications   Medication Sig Dispense Refill   • Fluticasone-Umeclidin-Vilant 100-62.5-25 MCG/INH AEROSOL POWDER, BREATH ACTIVATED Inhale 1 Puff by mouth every day. 3 Each 3   • albuterol 108 (90 Base) MCG/ACT Aero Soln inhalation aerosol Inhale 2 Puffs by mouth every 6 hours as needed for Shortness of Breath. 8.5 g 3   • gabapentin (NEURONTIN) 100 MG Cap Take 100 mg by mouth every day.  3   • atorvastatin (LIPITOR) 40 MG Tab Take 1 Tab by mouth every day. 100 Tab 3   • pantoprazole (PROTONIX) 40 MG Tablet Delayed Response Take 1 Tab by mouth every day. 90 Tab 3   • alendronate (FOSAMAX) 70 MG Tab Take 1 Tab by mouth every 7 days. 12 Tab 3   • levothyroxine (SYNTHROID) 112 MCG Tab Take 1 Tab by mouth Every morning on an empty stomach. 90 Tab 3   • Nutritional Supplements (VITAMIN D BOOSTER PO) Take 1 Tab by mouth every day.     • aspirin EC (ECOTRIN) 81 MG Tablet Delayed Response Take 81 mg by mouth every day.     • fluticasone (FLONASE) 50 MCG/ACT nasal spray Spray 1 Spray in nose every day. 16 g 11     No current facility-administered medications for this visit.        She  has a past medical history of Aortic aneurysm without rupture ([facility]), Atherosclerosis of aorta (HCC) (3/27/2019), Cataract, Compression fracture of fourth lumbar vertebra (HCC) (3/28/2018), Dental disorder (03/20/2018), Emphysema of lung ([facility]), Gastroesophageal reflux disease without esophagitis (5/11/2018), Hiatus hernia syndrome, Hypothyroidism (2/8/2016), Mixed hyperlipidemia (12/7/2016), and Protein-calorie malnutrition (HCC) (3/28/2018).    Patient has no known allergies.    She  reports that she has been smoking cigarettes. She has a 50.00 pack-year smoking history. She has never used smokeless tobacco. She reports previous alcohol use of about 0.6 oz of alcohol per week. She reports that she does not use  "drugs.  Ready to quit: Not Answered  Counseling given: Not Answered      ROS   No chest pain, no shortness of breath, no abdominal pain.     Objective:     Physical Exam:  /80 (BP Location: Right arm, Patient Position: Sitting, BP Cuff Size: Adult)   Pulse 96   Temp 36.9 °C (98.5 °F) (Temporal)   Ht 1.575 m (5' 2\")   Wt 41.4 kg (91 lb 4.8 oz)   SpO2 95%  Body mass index is 16.7 kg/m².   Constitutional: Alert, no distress.  Skin: Warm, dry, good turgor, no rashes in visible areas.  Eye: Equal, round and reactive, conjunctiva clear, lids normal.  ENMT: Lips without lesions, good dentition, oropharynx clear.  Neck: Trachea midline, no masses, no thyromegaly. No cervical or supraclavicular lymphadenopathy.  Respiratory: Unlabored respiratory effort, lungs clear to auscultation, no wheezes, no rhonchi.  Cardiovascular: Normal S1, S2  Abdomen: Soft, non-tender, no masses, no hepatosplenomegaly.  Psych: Alert and oriented x3, normal affect and mood.    Assessment and Plan:     1. Nausea  Patient with nausea for 8 hours without vomiting.  Improved today.  No signs of GI bleed but will check CBC to ensure no anemia and will monitor renal and hepatic function.  Patient to go to ER if not tolerating water or food, if any lightheadedness, melena, abdominal pain.  - CBC WITHOUT DIFFERENTIAL; Future  - Comp Metabolic Panel; Future    2. Stage 3 chronic kidney disease (HCC)  Slightly decreased GFR on last check, will repeat to ensure stability.  - CBC WITHOUT DIFFERENTIAL; Future  - Comp Metabolic Panel; Future    3. Chronic obstructive pulmonary disease, unspecified COPD type (HCC)  Poorly controlled chronic problem.  Will discontinue Spiriva and trial Trelegy.  Explained that patient is to use this once a day every day and albuterol as needed.  - Fluticasone-Umeclidin-Vilant 100-62.5-25 MCG/INH AEROSOL POWDER, BREATH ACTIVATED; Inhale 1 Puff by mouth every day.  Dispense: 3 Each; Refill: 3  - albuterol 108 (90 Base) " MCG/ACT Aero Soln inhalation aerosol; Inhale 2 Puffs by mouth every 6 hours as needed for Shortness of Breath.  Dispense: 8.5 g; Refill: 3    4. Aortic aneurysm without rupture, unspecified portion of aorta (HCC)  Patient has not yet made follow up with Dr. Saunders. Given phone number again today.     5. Essential hypertension  Well-controlled chronic problem.  - Comp Metabolic Panel; Future    6. Moderate protein-calorie malnutrition (HCC)  Patient has been watching her weight and this has been stable near 90 pounds.  She does try to add extra calories to her food but chronically does not have a very good appetite.    7. Hypothyroidism, unspecified type  We will ensure Synthroid is at correct dosage.  - TSH; Future  - FREE THYROXINE; Future    8. Slow transit constipation  Patient felt ill when she tried a fiber supplement in the past.  Suggest she stop mineral oil and instead use stool softener daily and MiraLAX as needed.    9. Age-related osteoporosis without current pathological fracture  Patient taking Fosamax once weekly.  Encouraged to hold this for the next 2 weeks until stomach is 100% better.      Discussion today about general wellness and lifestyle habits:    · Engage in regular physical activity and social activities.  · Prevent falls and reduce trip hazards; using ambulatory aides, hearing and vision testing if appropriate.  · Steps to improve urinary incontinence.  · Advanced care planning.    Follow-Up: Return in about 3 months (around 6/2/2020), or if symptoms worsen or fail to improve.         PLEASE NOTE: This dictation was created using voice recognition software. I have made every reasonable attempt to correct obvious errors, but I expect that there are errors of grammar and possibly content that I did not discover before finalizing the note.

## 2020-03-02 NOTE — PATIENT INSTRUCTIONS
Please call Dr. Bautista's office at:  [phone]        For constipation:  If you feel sick with fiber supplement, you may try a stool softener, such as docusate sodium.     When this doesn't do the trick, I suggest miralax as needed.             Today, your Healthcare Provider may have discussed the following recommendations:    1. Exercise and Physical Activity  According to the American Heart Association, it is recommended to engage in physical activity regularly and to aim for 150 minutes of moderate-intensity aerobic activity per week.  Your Healthcare Provider may have recommended taking the stairs instead of the elevator, starting or maintaining a walking program or strength-training program.    2. Emotional Well-being  Mental and emotional well-being is essential to overall health.  Your Healthcare Provider may have encouraged you to build strong, positive relationships with family and friends, become more involved in your community (by volunteering or joining a spiritual community), or focus on self-care.    3. Fall and Injury Prevention  To prevent falls and injuries and also improve your balance, your Healthcare Provider may have suggested that you use a cane or walker, start an exercise of physical therapy program, or have your vision and/or hearing tested.    4. Urinary Leakage (Urinary Incontinence)  To control or manage the leakage of urine, your Healthcare Provider may have recommended you start bladder training exercises (such as Kegel exercises), a trial of a medication or a referral to see a specialist to discuss surgical options.

## 2020-03-03 LAB
ALBUMIN SERPL BCP-MCNC: 4.2 G/DL (ref 3.2–4.9)
ALBUMIN/GLOB SERPL: 1.6 G/DL
ALP SERPL-CCNC: 83 U/L (ref 30–99)
ALT SERPL-CCNC: 15 U/L (ref 2–50)
ANION GAP SERPL CALC-SCNC: 6 MMOL/L (ref 0–11.9)
AST SERPL-CCNC: 23 U/L (ref 12–45)
BILIRUB SERPL-MCNC: 0.6 MG/DL (ref 0.1–1.5)
BUN SERPL-MCNC: 20 MG/DL (ref 8–22)
CALCIUM SERPL-MCNC: 9.2 MG/DL (ref 8.5–10.5)
CHLORIDE SERPL-SCNC: 105 MMOL/L (ref 96–112)
CO2 SERPL-SCNC: 27 MMOL/L (ref 20–33)
CREAT SERPL-MCNC: 0.94 MG/DL (ref 0.5–1.4)
FASTING STATUS PATIENT QL REPORTED: NORMAL
GLOBULIN SER CALC-MCNC: 2.7 G/DL (ref 1.9–3.5)
GLUCOSE SERPL-MCNC: 91 MG/DL (ref 65–99)
POTASSIUM SERPL-SCNC: 4.4 MMOL/L (ref 3.6–5.5)
PROT SERPL-MCNC: 6.9 G/DL (ref 6–8.2)
SODIUM SERPL-SCNC: 138 MMOL/L (ref 135–145)
T4 FREE SERPL-MCNC: 1.4 NG/DL (ref 0.53–1.43)
TSH SERPL DL<=0.005 MIU/L-ACNC: 6.74 UIU/ML (ref 0.38–5.33)

## 2020-03-17 ENCOUNTER — APPOINTMENT (OUTPATIENT)
Dept: RADIOLOGY | Facility: MEDICAL CENTER | Age: 85
End: 2020-03-17
Attending: SURGERY
Payer: MEDICARE

## 2020-03-24 ENCOUNTER — APPOINTMENT (OUTPATIENT)
Dept: RADIOLOGY | Facility: MEDICAL CENTER | Age: 85
End: 2020-03-24
Attending: SURGERY
Payer: MEDICARE

## 2020-05-13 RX ORDER — GABAPENTIN 100 MG/1
100 CAPSULE ORAL
Qty: 90 CAP | Refills: 0 | Status: SHIPPED | OUTPATIENT
Start: 2020-05-13 | End: 2020-08-19 | Stop reason: SDUPTHER

## 2020-05-27 DIAGNOSIS — E03.9 HYPOTHYROIDISM, UNSPECIFIED TYPE: ICD-10-CM

## 2020-05-27 DIAGNOSIS — J44.9 CHRONIC OBSTRUCTIVE PULMONARY DISEASE, UNSPECIFIED COPD TYPE (HCC): ICD-10-CM

## 2020-05-27 RX ORDER — LEVOTHYROXINE SODIUM 112 UG/1
112 TABLET ORAL
Qty: 90 TAB | Refills: 3 | Status: SHIPPED | OUTPATIENT
Start: 2020-05-27 | End: 2020-11-02

## 2020-05-27 RX ORDER — ALBUTEROL SULFATE 90 UG/1
2 AEROSOL, METERED RESPIRATORY (INHALATION) EVERY 6 HOURS PRN
Qty: 1 INHALER | Refills: 5 | Status: SHIPPED | OUTPATIENT
Start: 2020-05-27 | End: 2021-02-01 | Stop reason: SDUPTHER

## 2020-05-28 DIAGNOSIS — E78.2 MIXED HYPERLIPIDEMIA: ICD-10-CM

## 2020-05-28 RX ORDER — ATORVASTATIN CALCIUM 40 MG/1
40 TABLET, FILM COATED ORAL
Qty: 100 TAB | Refills: 0 | Status: SHIPPED | OUTPATIENT
Start: 2020-05-28 | End: 2020-10-19 | Stop reason: SDUPTHER

## 2020-06-01 ENCOUNTER — TELEPHONE (OUTPATIENT)
Dept: MEDICAL GROUP | Facility: PHYSICIAN GROUP | Age: 85
End: 2020-06-01

## 2020-06-01 NOTE — TELEPHONE ENCOUNTER
Patient called to let her us know that her middle finger, the tip of it, is bruised. Black in color but is not in pain

## 2020-06-04 DIAGNOSIS — J44.9 CHRONIC OBSTRUCTIVE PULMONARY DISEASE, UNSPECIFIED COPD TYPE (HCC): ICD-10-CM

## 2020-06-05 RX ORDER — ALBUTEROL SULFATE 90 UG/1
2 AEROSOL, METERED RESPIRATORY (INHALATION) EVERY 6 HOURS PRN
Qty: 8.5 INHALER | Refills: 0 | OUTPATIENT
Start: 2020-06-05

## 2020-08-19 ENCOUNTER — NURSE TRIAGE (OUTPATIENT)
Dept: HEALTH INFORMATION MANAGEMENT | Facility: OTHER | Age: 85
End: 2020-08-19

## 2020-08-19 RX ORDER — GABAPENTIN 100 MG/1
100 CAPSULE ORAL
Qty: 90 CAP | Refills: 0 | Status: SHIPPED | OUTPATIENT
Start: 2020-08-19 | End: 2020-09-30 | Stop reason: SDUPTHER

## 2020-08-19 NOTE — TELEPHONE ENCOUNTER
1. Caller Name: Mary Murrieta              Call Back Number: [phone]  [facility]: Yes         2.  In the last two weeks, has the patient had any new or worsening symptoms (not explained by alternative diagnosis)? Yes, the patient reports the following COVID-19 consistent symptoms: cough and shortness of breath or difficulty breathing, for years    3.  Does patient have any comoribidities? COPD,     4.  Has the patient traveled in the last 14 days OR had any known contact with someone who is suspected or confirmed to have COVID-19?  No.    5. Disposition: Cleared by RN Triage as potential is low for COVID-19; OK to keep/schedule appointment    Note routed to [facility] Provider: [facility] only.

## 2020-09-30 ENCOUNTER — OFFICE VISIT (OUTPATIENT)
Dept: MEDICAL GROUP | Facility: PHYSICIAN GROUP | Age: 85
End: 2020-09-30
Payer: MEDICARE

## 2020-09-30 VITALS
HEART RATE: 104 BPM | SYSTOLIC BLOOD PRESSURE: 116 MMHG | BODY MASS INDEX: 16.93 KG/M2 | TEMPERATURE: 99 F | OXYGEN SATURATION: 96 % | DIASTOLIC BLOOD PRESSURE: 70 MMHG | WEIGHT: 92 LBS | HEIGHT: 62 IN

## 2020-09-30 DIAGNOSIS — S32.040S COMPRESSION FRACTURE OF L4 VERTEBRA, SEQUELA: ICD-10-CM

## 2020-09-30 DIAGNOSIS — Z23 NEED FOR VACCINATION: ICD-10-CM

## 2020-09-30 DIAGNOSIS — M81.0 AGE-RELATED OSTEOPOROSIS WITHOUT CURRENT PATHOLOGICAL FRACTURE: ICD-10-CM

## 2020-09-30 DIAGNOSIS — I10 ESSENTIAL HYPERTENSION: ICD-10-CM

## 2020-09-30 DIAGNOSIS — J44.9 CHRONIC OBSTRUCTIVE PULMONARY DISEASE, UNSPECIFIED COPD TYPE (HCC): ICD-10-CM

## 2020-09-30 DIAGNOSIS — D69.6 THROMBOCYTOPENIA (HCC): ICD-10-CM

## 2020-09-30 DIAGNOSIS — F17.200 CURRENT SMOKER: ICD-10-CM

## 2020-09-30 DIAGNOSIS — I71.9 AORTIC ANEURYSM WITHOUT RUPTURE, UNSPECIFIED PORTION OF AORTA (HCC): ICD-10-CM

## 2020-09-30 DIAGNOSIS — E03.9 HYPOTHYROIDISM, UNSPECIFIED TYPE: ICD-10-CM

## 2020-09-30 PROCEDURE — 99214 OFFICE O/P EST MOD 30 MIN: CPT | Mod: 25 | Performed by: INTERNAL MEDICINE

## 2020-09-30 PROCEDURE — 90662 IIV NO PRSV INCREASED AG IM: CPT | Performed by: INTERNAL MEDICINE

## 2020-09-30 PROCEDURE — G0008 ADMIN INFLUENZA VIRUS VAC: HCPCS | Performed by: INTERNAL MEDICINE

## 2020-09-30 RX ORDER — GABAPENTIN 100 MG/1
100 CAPSULE ORAL
Qty: 90 CAP | Refills: 1 | Status: SHIPPED | OUTPATIENT
Start: 2020-09-30 | End: 2020-10-16

## 2020-09-30 ASSESSMENT — FIBROSIS 4 INDEX: FIB4 SCORE: 3.66

## 2020-09-30 NOTE — NON-PROVIDER
Annual Health Assessment Questions:    1.  Are you currently engaging in any exercise or physical activity? No    2.  How would you describe your mood or emotional well-being today? good    3.  Have you had any falls in the last year? Yes    4.  Have you noticed any problems with your balance or had difficulty walking? Yes    5.  In the last six months have you experienced any leakage of urine? Yes    6. DPA/Advanced Directive: Patient does not have an Advanced Directive.  A packet and workshop information was given on Advanced Directives.

## 2020-09-30 NOTE — PROGRESS NOTES
New Patient to establish    Chief Complaint   Patient presents with   • Establish Care       Subjective:     History of Present Illness: Patient is a 87 y.o. female who is here today to establish primary care    1. Hypothyroidism, unspecified type  >> Chronic, stable, denied having related symptoms, TSH little bit high, however regarding her age this level of TSH is acceptable  That she is compliant with Synthroid 112 mcg daily  TSH:   Lab Results   Component Value Date/Time    TSHULTRASEN 6.740 (H) 03/02/2020 1406     THYROXINE (T4):   Lab Results   Component Value Date/Time    FREET4 1.40 03/02/2020 1406         2. Essential hypertension  Chronic, stable, she was on lisinopril before, however stopped taking, her blood pressure within normal range    3. Chronic obstructive pulmonary disease, unspecified COPD type (HCC)  Chronic, stable, compliant withTrelegy Ellipta, albuterol as needed  -Patient currently denied having worsening of symptoms      4. Aortic aneurysm without rupture, unspecified portion of aorta ([facility])  CTA thoracoabdominal aorta June 2019:  IMPRESSION:     1.  Extensive atherosclerotic vascular disease involving the aorta, branch vessels, and coronary arteries.     2.  Maximal diameter and ascending thoracic aorta 3.5 cm. Maximal diameter descending thoracic aorta 3.4 cm.     3.  Infrarenal abdominal aortic aneurysm which measures 4.7 x 4.3 x 10.0 cm.     4.  Emphysematous changes throughout both lungs with extensive scarring and probable old granulomatous disease.    >> Patient is not sure if she is seeing or following up with Dr. Saunders vascular surgeon, she still smoking and she understand the risk multiple  -she is compliant with aspirin 81 mg daily, Lipitor 40 mg daily  LIPID:  Lab Results   Component Value Date/Time    CHOLSTRLTOT 154 03/04/2019 03:55 AM    CHOLSTRLTOT 183 04/06/2018 10:21 AM    LDL 80 03/04/2019 03:55 AM     (H) 04/06/2018 10:21 AM    HDL 63 03/04/2019 03:55 AM    HDL  66 04/06/2018 10:21 AM    TRIGLYCERIDE 56 03/04/2019 03:55 AM    TRIGLYCERIDE 77 04/06/2018 10:21 AM         5. Age-related osteoporosis without current pathological fracture  Based on DEXA scan in April 2018, she is compliant with Fosamax weekly    6. Current smoker  Chronic, 1 pack/day for 40 years, patient understand the risk especially in the light of aneurysm and possibility of rupture, she denies alternative medicine, patient will continue smoking for now but possibly try to taper down    Thrombocytopenia  - Stable no bleeding episodes so far    protein-calorie malnutrition (HCC)  Chronic, stable, around 90 pounds.    -Patient try to eat extra calories beside her meals, however she has chronically less appetite for eating    Current Outpatient Medications on File Prior to Visit   Medication Sig Dispense Refill   • atorvastatin (LIPITOR) 40 MG Tab Take 1 Tab by mouth every day. 100 Tab 0   • levothyroxine (SYNTHROID) 112 MCG Tab Take 1 Tab by mouth Every morning on an empty stomach. 90 Tab 3   • albuterol 108 (90 Base) MCG/ACT Aero Soln inhalation aerosol Inhale 2 Puffs by mouth every 6 hours as needed for Shortness of Breath. 1 Inhaler 5   • Fluticasone-Umeclidin-Vilant 100-62.5-25 MCG/INH AEROSOL POWDER, BREATH ACTIVATED Inhale 1 Puff by mouth every day. 3 Each 3   • fluticasone (FLONASE) 50 MCG/ACT nasal spray Spray 1 Spray in nose every day. 16 g 11   • pantoprazole (PROTONIX) 40 MG Tablet Delayed Response Take 1 Tab by mouth every day. 90 Tab 3   • alendronate (FOSAMAX) 70 MG Tab Take 1 Tab by mouth every 7 days. 12 Tab 3   • Nutritional Supplements (VITAMIN D BOOSTER PO) Take 1 Tab by mouth every day.     • aspirin EC (ECOTRIN) 81 MG Tablet Delayed Response Take 81 mg by mouth every day.       No current facility-administered medications on file prior to visit.      No Known Allergies  Patient Active Problem List    Diagnosis Date Noted   • Post-nasal drip 11/21/2019   • Age-related osteoporosis without  current pathological fracture 11/21/2019   • Stage 3 chronic kidney disease (HCC) 11/21/2019   • Thrombocytopenia ([facility]) 11/21/2019   • Slow transit constipation 11/21/2019   • Essential hypertension 06/21/2019   • COPD (chronic obstructive pulmonary disease) ([facility]) 05/28/2019   • Atherosclerosis of aorta ([facility]) 03/27/2019   • History of CVA (cerebrovascular accident) 03/04/2019   • Ataxia 03/03/2019   • Gastroesophageal reflux disease without esophagitis 05/11/2018   • Hearing difficulty 05/11/2018   • Aortic aneurysm without rupture ([facility]) 03/28/2018   • Compression fracture of fourth lumbar vertebra ([facility]) 03/28/2018   • Protein-calorie malnutrition ([facility]) 03/28/2018   • Current smoker 12/07/2016   • Mixed hyperlipidemia 12/07/2016   • Hypothyroidism 02/08/2016     Past Medical History:   Diagnosis Date   • Aortic aneurysm without rupture ([facility])    • Atherosclerosis of aorta ([facility]) 3/27/2019   • Cataract     +surgery bilat   • Compression fracture of fourth lumbar vertebra ([facility]) 3/28/2018   • Dental disorder 03/20/2018    upper and lower   • Emphysema of lung ([facility])    • Gastroesophageal reflux disease without esophagitis 5/11/2018   • Hiatus hernia syndrome    • Hypothyroidism 2/8/2016   • Mixed hyperlipidemia 12/7/2016   • Protein-calorie malnutrition ([facility]) 3/28/2018     Past Surgical History:   Procedure Laterality Date   • ABDOMINAL HYSTERECTOMY TOTAL  1986   • TONSILLECTOMY  1938   • THYROIDECTOMY      1976     Family History   Problem Relation Age of Onset   • Cancer Mother    • Cancer Father         esophageal   • Stroke Father    • Lung Disease Sister         emphysema     Social History     Tobacco Use   • Smoking status: Current Every Day Smoker     Packs/day: 1.00     Years: 50.00     Pack years: 50.00     Types: Cigarettes   • Smokeless tobacco: Never Used   Substance Use Topics   • Alcohol use: Not Currently     Alcohol/week: 0.6 oz     Types: 1 Glasses of wine per week     Comment: 3 per week   • Drug use:  "No         ROS:     - Constitutional: Negative for fever, chills,    - Eye: Negative for blurry vision    -ENT: Negative for ear pain    - Respiratory: Negative for cough, hemoptysis    - Cardiovascular: Negative for chest pain     - Gastrointestinal: Negative for abdominal pain    - Genitourinary: Negative for dysuria    - Musculoskeletal: Negative for joint swelling    - Skin: Negative for itching    - Neurological: Negative for focal weakness     - Psychiatric/Behavioral: Negative for depression        Physical Exam:     /70 (BP Location: Right arm, Patient Position: Sitting, BP Cuff Size: Adult)   Pulse (!) 104   Temp 37.2 °C (99 °F) (Temporal)   Ht 1.575 m (5' 2\")   Wt 41.7 kg (92 lb)   LMP 03/03/1970 (Approximate)   SpO2 96%   BMI 16.83 kg/m²   General: Normal appearing. No distress.  ENT: oropharynx without exudates.    Eyes: conjunctiva clear lids without ptosis  Pulmonary: Clear to ausculation.  Normal effort.   Cardiovascular: Regular rate and rhythm  Abdomen: Soft, nontender,  Lymph: No cervical or supraclavicular palpable lymph nodes  Psych: Normal mood and affect.     I have reviewed pertinent labs and diagnostic tests associated with this visit with specific comments listed under the assessment and plan below      Assessment and Plan:     1. Hypothyroidism, unspecified type  Continue Synthroid as above    2. Essential hypertension  Stable, she is not on any antihypertensive medication    3. Chronic obstructive pulmonary disease, unspecified COPD type (HCC)  Continue Trelegy Ellipta, albuterol as needed    4. Aortic aneurysm without rupture, unspecified portion of aorta (HCC)  >> Advised to be seen by vascular medicine specialist for further management  - REFERRAL TO VASCULAR MEDICINE Reason for Referral? Established Vascular Disease    5. Age-related osteoporosis without current pathological fracture  Continue Fosamax for now  -Advised to continue vitamin D supplement     6. Current " smoker  -Counseled about the negative health risks of smoking, pt expressed understanding  -Encouraged smoking cessation  -CTM and f/u with support to quit       7. Need for vaccination  - INFLUENZA VACCINE, HIGH DOSE (65+ ONLY)    8. Compression fracture of L4 vertebra, sequela  - gabapentin (NEURONTIN) 100 MG Cap; Take 1 Cap by mouth every day.  Dispense: 90 Cap; Refill: 1      Follow Up:      Return in about 6 months (around 3/30/2021) for follow up.    Please note that this dictation was created using voice recognition software. I have made every reasonable attempt to correct obvious errors, but I expect that there are errors of grammar and possibly content that I did not discover before finalizing the note.    Signed by: Watson Chaudhari M.D.

## 2020-10-14 ENCOUNTER — APPOINTMENT (OUTPATIENT)
Dept: RADIOLOGY | Facility: MEDICAL CENTER | Age: 85
End: 2020-10-14
Attending: EMERGENCY MEDICINE
Payer: MEDICARE

## 2020-10-14 ENCOUNTER — HOSPITAL ENCOUNTER (OUTPATIENT)
Facility: MEDICAL CENTER | Age: 85
End: 2020-10-16
Attending: EMERGENCY MEDICINE | Admitting: HOSPITALIST
Payer: MEDICARE

## 2020-10-14 ENCOUNTER — APPOINTMENT (OUTPATIENT)
Dept: URGENT CARE | Facility: PHYSICIAN GROUP | Age: 85
End: 2020-10-14
Payer: MEDICARE

## 2020-10-14 DIAGNOSIS — R42 LIGHTHEADEDNESS: ICD-10-CM

## 2020-10-14 DIAGNOSIS — R06.02 SOB (SHORTNESS OF BREATH): ICD-10-CM

## 2020-10-14 DIAGNOSIS — R55 NEAR SYNCOPE: ICD-10-CM

## 2020-10-14 LAB
ALBUMIN SERPL BCP-MCNC: 4.4 G/DL (ref 3.2–4.9)
ALBUMIN/GLOB SERPL: 1.5 G/DL
ALP SERPL-CCNC: 123 U/L (ref 30–99)
ALT SERPL-CCNC: 16 U/L (ref 2–50)
ANION GAP SERPL CALC-SCNC: 11 MMOL/L (ref 7–16)
AST SERPL-CCNC: 19 U/L (ref 12–45)
BASOPHILS # BLD AUTO: 1.1 % (ref 0–1.8)
BASOPHILS # BLD: 0.05 K/UL (ref 0–0.12)
BILIRUB SERPL-MCNC: 0.5 MG/DL (ref 0.1–1.5)
BUN SERPL-MCNC: 15 MG/DL (ref 8–22)
CALCIUM SERPL-MCNC: 9.8 MG/DL (ref 8.5–10.5)
CHLORIDE SERPL-SCNC: 103 MMOL/L (ref 96–112)
CO2 SERPL-SCNC: 25 MMOL/L (ref 20–33)
COVID ORDER STATUS COVID19: NORMAL
CREAT SERPL-MCNC: 0.96 MG/DL (ref 0.5–1.4)
EKG IMPRESSION: NORMAL
EKG IMPRESSION: NORMAL
EOSINOPHIL # BLD AUTO: 0.03 K/UL (ref 0–0.51)
EOSINOPHIL NFR BLD: 0.6 % (ref 0–6.9)
ERYTHROCYTE [DISTWIDTH] IN BLOOD BY AUTOMATED COUNT: 51.6 FL (ref 35.9–50)
GLOBULIN SER CALC-MCNC: 3 G/DL (ref 1.9–3.5)
GLUCOSE SERPL-MCNC: 77 MG/DL (ref 65–99)
HCT VFR BLD AUTO: 42.2 % (ref 37–47)
HGB BLD-MCNC: 13.9 G/DL (ref 12–16)
IMM GRANULOCYTES # BLD AUTO: 0.01 K/UL (ref 0–0.11)
IMM GRANULOCYTES NFR BLD AUTO: 0.2 % (ref 0–0.9)
LYMPHOCYTES # BLD AUTO: 0.87 K/UL (ref 1–4.8)
LYMPHOCYTES NFR BLD: 18.7 % (ref 22–41)
MAGNESIUM SERPL-MCNC: 2.2 MG/DL (ref 1.5–2.5)
MCH RBC QN AUTO: 31.3 PG (ref 27–33)
MCHC RBC AUTO-ENTMCNC: 32.9 G/DL (ref 33.6–35)
MCV RBC AUTO: 95 FL (ref 81.4–97.8)
MONOCYTES # BLD AUTO: 0.49 K/UL (ref 0–0.85)
MONOCYTES NFR BLD AUTO: 10.5 % (ref 0–13.4)
NEUTROPHILS # BLD AUTO: 3.21 K/UL (ref 2–7.15)
NEUTROPHILS NFR BLD: 68.9 % (ref 44–72)
NRBC # BLD AUTO: 0 K/UL
NRBC BLD-RTO: 0 /100 WBC
NT-PROBNP SERPL IA-MCNC: 561 PG/ML (ref 0–125)
PLATELET # BLD AUTO: 141 K/UL (ref 164–446)
PMV BLD AUTO: 11 FL (ref 9–12.9)
POTASSIUM SERPL-SCNC: 4 MMOL/L (ref 3.6–5.5)
PROT SERPL-MCNC: 7.4 G/DL (ref 6–8.2)
RBC # BLD AUTO: 4.44 M/UL (ref 4.2–5.4)
SARS-COV-2 RNA RESP QL NAA+PROBE: NOTDETECTED
SODIUM SERPL-SCNC: 139 MMOL/L (ref 135–145)
SPECIMEN SOURCE: NORMAL
TROPONIN T SERPL-MCNC: 23 NG/L (ref 6–19)
TROPONIN T SERPL-MCNC: 26 NG/L (ref 6–19)
TSH SERPL DL<=0.005 MIU/L-ACNC: 7.18 UIU/ML (ref 0.38–5.33)
WBC # BLD AUTO: 4.7 K/UL (ref 4.8–10.8)

## 2020-10-14 PROCEDURE — 82607 VITAMIN B-12: CPT

## 2020-10-14 PROCEDURE — 71045 X-RAY EXAM CHEST 1 VIEW: CPT

## 2020-10-14 PROCEDURE — 84443 ASSAY THYROID STIM HORMONE: CPT

## 2020-10-14 PROCEDURE — 99285 EMERGENCY DEPT VISIT HI MDM: CPT

## 2020-10-14 PROCEDURE — 83735 ASSAY OF MAGNESIUM: CPT

## 2020-10-14 PROCEDURE — G0378 HOSPITAL OBSERVATION PER HR: HCPCS

## 2020-10-14 PROCEDURE — 700105 HCHG RX REV CODE 258: Performed by: STUDENT IN AN ORGANIZED HEALTH CARE EDUCATION/TRAINING PROGRAM

## 2020-10-14 PROCEDURE — 700102 HCHG RX REV CODE 250 W/ 637 OVERRIDE(OP): Performed by: STUDENT IN AN ORGANIZED HEALTH CARE EDUCATION/TRAINING PROGRAM

## 2020-10-14 PROCEDURE — 93005 ELECTROCARDIOGRAM TRACING: CPT | Performed by: EMERGENCY MEDICINE

## 2020-10-14 PROCEDURE — C9803 HOPD COVID-19 SPEC COLLECT: HCPCS | Performed by: EMERGENCY MEDICINE

## 2020-10-14 PROCEDURE — 84484 ASSAY OF TROPONIN QUANT: CPT

## 2020-10-14 PROCEDURE — 85025 COMPLETE CBC W/AUTO DIFF WBC: CPT

## 2020-10-14 PROCEDURE — U0003 INFECTIOUS AGENT DETECTION BY NUCLEIC ACID (DNA OR RNA); SEVERE ACUTE RESPIRATORY SYNDROME CORONAVIRUS 2 (SARS-COV-2) (CORONAVIRUS DISEASE [COVID-19]), AMPLIFIED PROBE TECHNIQUE, MAKING USE OF HIGH THROUGHPUT TECHNOLOGIES AS DESCRIBED BY CMS-2020-01-R: HCPCS

## 2020-10-14 PROCEDURE — 83880 ASSAY OF NATRIURETIC PEPTIDE: CPT

## 2020-10-14 PROCEDURE — 80053 COMPREHEN METABOLIC PANEL: CPT

## 2020-10-14 PROCEDURE — 82746 ASSAY OF FOLIC ACID SERUM: CPT

## 2020-10-14 PROCEDURE — 93005 ELECTROCARDIOGRAM TRACING: CPT

## 2020-10-14 PROCEDURE — A9270 NON-COVERED ITEM OR SERVICE: HCPCS | Performed by: STUDENT IN AN ORGANIZED HEALTH CARE EDUCATION/TRAINING PROGRAM

## 2020-10-14 PROCEDURE — 70450 CT HEAD/BRAIN W/O DYE: CPT

## 2020-10-14 RX ORDER — ATORVASTATIN CALCIUM 40 MG/1
40 TABLET, FILM COATED ORAL
Status: DISCONTINUED | OUTPATIENT
Start: 2020-10-15 | End: 2020-10-16 | Stop reason: HOSPADM

## 2020-10-14 RX ORDER — OMEPRAZOLE 20 MG/1
20 CAPSULE, DELAYED RELEASE ORAL DAILY
Status: DISCONTINUED | OUTPATIENT
Start: 2020-10-15 | End: 2020-10-16 | Stop reason: HOSPADM

## 2020-10-14 RX ORDER — PANTOPRAZOLE SODIUM 40 MG/1
40 TABLET, DELAYED RELEASE ORAL
Status: DISCONTINUED | OUTPATIENT
Start: 2020-10-14 | End: 2020-10-14

## 2020-10-14 RX ORDER — AMOXICILLIN 250 MG
2 CAPSULE ORAL 2 TIMES DAILY
Status: DISCONTINUED | OUTPATIENT
Start: 2020-10-14 | End: 2020-10-16 | Stop reason: HOSPADM

## 2020-10-14 RX ORDER — ACETAMINOPHEN 325 MG/1
650 TABLET ORAL EVERY 6 HOURS PRN
Status: DISCONTINUED | OUTPATIENT
Start: 2020-10-14 | End: 2020-10-16 | Stop reason: HOSPADM

## 2020-10-14 RX ORDER — FLUTICASONE PROPIONATE 44 UG/1
2 AEROSOL, METERED RESPIRATORY (INHALATION) DAILY
Status: DISCONTINUED | OUTPATIENT
Start: 2020-10-15 | End: 2020-10-16 | Stop reason: HOSPADM

## 2020-10-14 RX ORDER — BISACODYL 10 MG
10 SUPPOSITORY, RECTAL RECTAL
Status: DISCONTINUED | OUTPATIENT
Start: 2020-10-14 | End: 2020-10-16 | Stop reason: HOSPADM

## 2020-10-14 RX ORDER — GABAPENTIN 100 MG/1
100 CAPSULE ORAL
Status: DISCONTINUED | OUTPATIENT
Start: 2020-10-15 | End: 2020-10-15

## 2020-10-14 RX ORDER — SODIUM CHLORIDE, SODIUM LACTATE, POTASSIUM CHLORIDE, CALCIUM CHLORIDE 600; 310; 30; 20 MG/100ML; MG/100ML; MG/100ML; MG/100ML
INJECTION, SOLUTION INTRAVENOUS CONTINUOUS
Status: DISCONTINUED | OUTPATIENT
Start: 2020-10-14 | End: 2020-10-15

## 2020-10-14 RX ORDER — ALBUTEROL SULFATE 90 UG/1
2 AEROSOL, METERED RESPIRATORY (INHALATION) EVERY 6 HOURS PRN
Status: DISCONTINUED | OUTPATIENT
Start: 2020-10-14 | End: 2020-10-16 | Stop reason: HOSPADM

## 2020-10-14 RX ORDER — POLYETHYLENE GLYCOL 3350 17 G/17G
1 POWDER, FOR SOLUTION ORAL
Status: DISCONTINUED | OUTPATIENT
Start: 2020-10-14 | End: 2020-10-16 | Stop reason: HOSPADM

## 2020-10-14 RX ORDER — LEVOTHYROXINE SODIUM 112 UG/1
112 TABLET ORAL
Status: DISCONTINUED | OUTPATIENT
Start: 2020-10-15 | End: 2020-10-16 | Stop reason: HOSPADM

## 2020-10-14 RX ADMIN — SODIUM CHLORIDE, POTASSIUM CHLORIDE, SODIUM LACTATE AND CALCIUM CHLORIDE: 600; 310; 30; 20 INJECTION, SOLUTION INTRAVENOUS at 21:57

## 2020-10-14 RX ADMIN — DOCUSATE SODIUM 50 MG AND SENNOSIDES 8.6 MG 2 TABLET: 8.6; 5 TABLET, FILM COATED ORAL at 20:01

## 2020-10-14 ASSESSMENT — COGNITIVE AND FUNCTIONAL STATUS - GENERAL
DRESSING REGULAR LOWER BODY CLOTHING: A LITTLE
HELP NEEDED FOR BATHING: A LITTLE
TURNING FROM BACK TO SIDE WHILE IN FLAT BAD: A LITTLE
SUGGESTED CMS G CODE MODIFIER DAILY ACTIVITY: CK
MOVING FROM LYING ON BACK TO SITTING ON SIDE OF FLAT BED: A LITTLE
SUGGESTED CMS G CODE MODIFIER MOBILITY: CK
MOVING TO AND FROM BED TO CHAIR: A LITTLE
DAILY ACTIVITIY SCORE: 18
WALKING IN HOSPITAL ROOM: A LITTLE
STANDING UP FROM CHAIR USING ARMS: A LITTLE
CLIMB 3 TO 5 STEPS WITH RAILING: A LITTLE
EATING MEALS: A LITTLE
TOILETING: A LITTLE
DRESSING REGULAR UPPER BODY CLOTHING: A LITTLE
PERSONAL GROOMING: A LITTLE
MOBILITY SCORE: 18

## 2020-10-14 ASSESSMENT — LIFESTYLE VARIABLES
HAVE PEOPLE ANNOYED YOU BY CRITICIZING YOUR DRINKING: NO
HOW MANY TIMES IN THE PAST YEAR HAVE YOU HAD 5 OR MORE DRINKS IN A DAY: 0
TOTAL SCORE: 0
DO YOU DRINK ALCOHOL: NO
TOTAL SCORE: 0
DOES PATIENT WANT TO STOP DRINKING: NO
HAVE YOU EVER FELT YOU SHOULD CUT DOWN ON YOUR DRINKING: NO
AVERAGE NUMBER OF DAYS PER WEEK YOU HAVE A DRINK CONTAINING ALCOHOL: 0
ON A TYPICAL DAY WHEN YOU DRINK ALCOHOL HOW MANY DRINKS DO YOU HAVE: 0
EVER FELT BAD OR GUILTY ABOUT YOUR DRINKING: NO
ALCOHOL_USE: NO
CONSUMPTION TOTAL: NEGATIVE
EVER HAD A DRINK FIRST THING IN THE MORNING TO STEADY YOUR NERVES TO GET RID OF A HANGOVER: NO
TOTAL SCORE: 0

## 2020-10-14 ASSESSMENT — PATIENT HEALTH QUESTIONNAIRE - PHQ9
2. FEELING DOWN, DEPRESSED, IRRITABLE, OR HOPELESS: NOT AT ALL
SUM OF ALL RESPONSES TO PHQ9 QUESTIONS 1 AND 2: 0
1. LITTLE INTEREST OR PLEASURE IN DOING THINGS: NOT AT ALL

## 2020-10-14 ASSESSMENT — FIBROSIS 4 INDEX
FIB4 SCORE: 3.66
FIB4 SCORE: 2.93

## 2020-10-14 NOTE — ED PROVIDER NOTES
ED Provider Note  CHIEF COMPLAINT  Chief Complaint   Patient presents with   • Lightheadedness     x 1 week   • Shortness of Breath     hx of COPD     Patient here with her daughter  Patient is hard of hearing    HPI  Mary Murrieta is a 87 y.o. female with a history of COPD not on home O2 and thyroid disorder who presents complaining of lightheadedness and shortness of breath.    The patient states she has been feeling lightheaded with walking.  She was not using any sort of assist device up until this week.  She is currently using a walker and feels lightheaded after walking for some time as well as having dyspnea on exertion.  She feels she might pass out and has to stop to rest before she can continue.    Patient denies falls, head trauma, headache, leg swelling, calf pain, fever, chills, nausea, vomiting, diaphoresis, chest pain, hemoptysis, melena.      ALLERGIES  No Known Allergies    CURRENT MEDICATIONS  Home Medications     Reviewed by Dimas Lee (Pharmacy Tech) on 10/14/20 at 1652  Med List Status: Complete   Medication Last Dose Status   albuterol 108 (90 Base) MCG/ACT Aero Soln inhalation aerosol PRN Active   alendronate (FOSAMAX) 70 MG Tab 10/12/2020 Active   aspirin EC (ECOTRIN) 81 MG Tablet Delayed Response 10/14/2020 Active   atorvastatin (LIPITOR) 40 MG Tab 10/14/2020 Active   fluticasone (FLONASE) 50 MCG/ACT nasal spray Not Taking Active   Fluticasone-Umeclidin-Vilant 100-62.5-25 MCG/INH AEROSOL POWDER, BREATH ACTIVATED 10/14/2020 Active   gabapentin (NEURONTIN) 100 MG Cap 10/14/2020 Active   levothyroxine (SYNTHROID) 112 MCG Tab 10/14/2020 Active   Nutritional Supplements (VITAMIN D BOOSTER PO) 10/14/2020 Active   pantoprazole (PROTONIX) 40 MG Tablet Delayed Response 10/14/2020 Active                PAST MEDICAL HISTORY   has a past medical history of Aortic aneurysm without rupture (HCC), Atherosclerosis of aorta (HCC) (3/27/2019), Cataract, Compression fracture of fourth lumbar  "vertebra (HCC) (3/28/2018), Dental disorder (03/20/2018), Emphysema of lung (HCC), Gastroesophageal reflux disease without esophagitis (5/11/2018), Hiatus hernia syndrome, Hypothyroidism (2/8/2016), Mixed hyperlipidemia (12/7/2016), and Protein-calorie malnutrition (HCC) (3/28/2018).    SURGICAL HISTORY   has a past surgical history that includes tonsillectomy (1938); thyroidectomy; and abdominal hysterectomy total (1986).    SOCIAL HISTORY  Social History     Tobacco Use   • Smoking status: Current Every Day Smoker     Packs/day: 1.00     Years: 50.00     Pack years: 50.00     Types: Cigarettes   • Smokeless tobacco: Never Used   Substance and Sexual Activity   • Alcohol use: Not Currently     Alcohol/week: 0.6 oz     Types: 1 Glasses of wine per week     Comment: 3 per week   • Drug use: No   • Sexual activity: Never     Partners: Male     Here with her daughter with whom she lives    REVIEW OF SYSTEMS  See HPI for further details.  All other systems are negative except as above in HPI.      PHYSICAL EXAM  VITAL SIGNS: /65   Pulse 92   Temp 36.5 °C (97.7 °F) (Oral)   Resp (!) 30   Ht 1.575 m (5' 2\")   Wt 41 kg (90 lb 6.2 oz)   LMP 03/03/1970 (Approximate)   SpO2 93%   BMI 16.53 kg/m²     General:  WD thin, elderly female, chronically ill but nontoxic appearing in NAD; A+Ox3; V/S as above  Skin: warm and dry; good color; no rash  HEENT: NCAT; EOMs intact; no scleral icterus   Neck: Soft, no JVD  Cardiovascular: Regular heart rate and rhythm.  No murmurs, rubs, or gallops; pulses 2+ bilaterally radially and DP areas  Lungs: Clear to auscultation with decreased air movement bilaterally.  No wheezes, rhonchi, or rales.   Abdomen: BS present; soft; NTND; no rebound, guarding, or rigidity.  No organomegaly or pulsatile mass  Extremities: PARRA x 4; no e/o trauma; no pedal edema; neg Helder's  Neurologic: CNs III-XII grossly intact; speech clear; distal sensation intact; strength 4/5 UE/LEs  Psychiatric: " Appropriate affect, normal mood    LABS  Results for orders placed or performed during the hospital encounter of 10/14/20   CBC WITH DIFFERENTIAL   Result Value Ref Range    WBC 4.7 (L) 4.8 - 10.8 K/uL    RBC 4.44 4.20 - 5.40 M/uL    Hemoglobin 13.9 12.0 - 16.0 g/dL    Hematocrit 42.2 37.0 - 47.0 %    MCV 95.0 81.4 - 97.8 fL    MCH 31.3 27.0 - 33.0 pg    MCHC 32.9 (L) 33.6 - 35.0 g/dL    RDW 51.6 (H) 35.9 - 50.0 fL    Platelet Count 141 (L) 164 - 446 K/uL    MPV 11.0 9.0 - 12.9 fL    Neutrophils-Polys 68.90 44.00 - 72.00 %    Lymphocytes 18.70 (L) 22.00 - 41.00 %    Monocytes 10.50 0.00 - 13.40 %    Eosinophils 0.60 0.00 - 6.90 %    Basophils 1.10 0.00 - 1.80 %    Immature Granulocytes 0.20 0.00 - 0.90 %    Nucleated RBC 0.00 /100 WBC    Neutrophils (Absolute) 3.21 2.00 - 7.15 K/uL    Lymphs (Absolute) 0.87 (L) 1.00 - 4.80 K/uL    Monos (Absolute) 0.49 0.00 - 0.85 K/uL    Eos (Absolute) 0.03 0.00 - 0.51 K/uL    Baso (Absolute) 0.05 0.00 - 0.12 K/uL    Immature Granulocytes (abs) 0.01 0.00 - 0.11 K/uL    NRBC (Absolute) 0.00 K/uL   COMP METABOLIC PANEL   Result Value Ref Range    Sodium 139 135 - 145 mmol/L    Potassium 4.0 3.6 - 5.5 mmol/L    Chloride 103 96 - 112 mmol/L    Co2 25 20 - 33 mmol/L    Anion Gap 11.0 7.0 - 16.0    Glucose 77 65 - 99 mg/dL    Bun 15 8 - 22 mg/dL    Creatinine 0.96 0.50 - 1.40 mg/dL    Calcium 9.8 8.5 - 10.5 mg/dL    AST(SGOT) 19 12 - 45 U/L    ALT(SGPT) 16 2 - 50 U/L    Alkaline Phosphatase 123 (H) 30 - 99 U/L    Total Bilirubin 0.5 0.1 - 1.5 mg/dL    Albumin 4.4 3.2 - 4.9 g/dL    Total Protein 7.4 6.0 - 8.2 g/dL    Globulin 3.0 1.9 - 3.5 g/dL    A-G Ratio 1.5 g/dL   ESTIMATED GFR   Result Value Ref Range    GFR If African American >60 >60 mL/min/1.73 m 2    GFR If Non African American 55 (A) >60 mL/min/1.73 m 2   proBrain Natriuretic Peptide, NT   Result Value Ref Range    NT-proBNP 561 (H) 0 - 125 pg/mL   TROPONIN   Result Value Ref Range    Troponin T 26 (H) 6 - 19 ng/L   COVID/SARS  CoV-2 PCR    Specimen: Nasopharyngeal; Respirate   Result Value Ref Range    COVID Order Status Received    SARS-CoV-2, PCR (In-House)   Result Value Ref Range    SARS-CoV-2 Source NP Swab     SARS-CoV-2 by PCR NotDetected    EKG (NOW)   Result Value Ref Range    Report       [facility] Emergency Dept.    Test Date:  2020-10-14  Pt Name:    [facility]                 Department: ER  MRN:        [id number]                      Room:  Gender:     Female                       Technician: [id number]  :        [id number]                   Requested By:ER TRIAGE PROTOCOL  Order #:    [id number]                    Reading MD: FRANCES MALDONADO MD    Measurements  Intervals                                Axis  Rate:       84                           P:  NY:                                      QRS:        67  QRSD:       116                          T:          -55  QT:         395  QTc:        467    Interpretive Statements  Atrial fibrillation  Nonspecific intraventricular conduction delay  Borderline repolarization abnormality  Compared to ECG 2019 14:09:25  Sinus rhythm no longer present  Atrial premature complex(es) no longer present  Electronically Signed On 10- 15:10:52 PDT by FRANCES MALDONADO MD     EKG   Result Value Ref Range    Report       [facility] Emergency Dept.    Test Date:  2020-10-14  Pt Name:    [facility]                 Department: ER  MRN:        [id number]                      Room:       GR 38  Gender:     Female                       Technician: [id number]  :        [id number]                   Requested By:FRANCES MALDONADO  Order #:    [id number]                    Reading MD: FRANCES MALDONADO MD    Measurements  Intervals                                Axis  Rate:       80                           P:          -50  NY:         140                          QRS:        59  QRSD:       116                          T:          45  QT:         432  QTc:         499    Interpretive Statements  UNKNOWN RHYTHM, IRREGULAR RATE  64- 99  NONSPECIFIC INTRAVENTRICULAR CONDUCTION DELAY  LEFT VENTRICULAR HYPERTROPHY  Compared to ECG 10/14/2020 13:13:23  Left ventricular hypertrophy now present  Atrial fibrillation no longer present  Electronically Signed On 10- 16:49:45 PDT by STEPH LOOMIS         IMAGING  CT-HEAD W/O   Final Result         1. No acute intracranial findings. No evidence of acute intracranial hemorrhage or mass lesion.      2. White matter lucencies most consistent with chronic small vessel ischemic change.               DX-CHEST-PORTABLE (1 VIEW)   Final Result      1.  Lungs are hyperinflated with changes of COPD.   2.  Linear upper lobe opacities are likely due to areas of parenchymal scarring.   3.  There is no acute failure, effusion, or pneumonia.          MEDICAL RECORD  I have reviewed patient's medical record and pertinent results are listed below.      COURSE & MEDICAL DECISION MAKING  I have reviewed any medical record information, laboratory studies and radiographic results as noted.    Mary Murrieta is a 87 y.o. female who presents complaining of near syncope, lightheadedness, and shortness of breath for 1 week.    Appropriate PPE was worn at all times while interacting with the patient, including goggles, N95 mask, and surgical mask.    Initial EKG read as A. fib but motion artifact present.  Repeat EKG demonstrates possible accelerated junctional rhythm.  No acute ST changes.    Chest x-ray demonstrates no vascular congestion, consolidation, or effusion.    Patient's white blood cell count is slightly low at 4.7, alk phos is elevated to 123, troponin 26, BNP elevated to 561.  COVID is pending.      CT head demonstrates no acute pathology.    5:18 PM  Paging hospitalist    5:32 PM  I discussed the case with the hospitalist who agrees to hospitalize the patient.    FINAL IMPRESSION  1. Lightheadedness     2. SOB (shortness of  breath)     3. Near syncope       Electronically signed by: Patricia Cardoza M.D., 10/14/2020 3:01 PM

## 2020-10-14 NOTE — ED TRIAGE NOTES
.Mary Murrieta  .  Chief Complaint   Patient presents with   • Lightheadedness     x 1 week   • Shortness of Breath     hx of COPD     Patient to triage with above complaint. Patient SOB with minimal activity. EKG completed.   Mask in place.  Patient to senior lounge and instructed to inform staff of any needs.

## 2020-10-15 ENCOUNTER — APPOINTMENT (OUTPATIENT)
Dept: RADIOLOGY | Facility: MEDICAL CENTER | Age: 85
End: 2020-10-15
Attending: STUDENT IN AN ORGANIZED HEALTH CARE EDUCATION/TRAINING PROGRAM
Payer: MEDICARE

## 2020-10-15 ENCOUNTER — APPOINTMENT (OUTPATIENT)
Dept: CARDIOLOGY | Facility: MEDICAL CENTER | Age: 85
End: 2020-10-15
Attending: STUDENT IN AN ORGANIZED HEALTH CARE EDUCATION/TRAINING PROGRAM
Payer: MEDICARE

## 2020-10-15 PROBLEM — R42 LIGHTHEADEDNESS: Status: ACTIVE | Noted: 2020-10-15

## 2020-10-15 PROBLEM — R79.89 ELEVATED TROPONIN: Status: ACTIVE | Noted: 2020-10-15

## 2020-10-15 PROBLEM — I48.92 ATRIAL FIBRILLATION AND FLUTTER (HCC): Status: ACTIVE | Noted: 2020-10-15

## 2020-10-15 PROBLEM — R79.89 ELEVATED BRAIN NATRIURETIC PEPTIDE (BNP) LEVEL: Status: ACTIVE | Noted: 2020-10-15

## 2020-10-15 PROBLEM — R53.1 WEAKNESS: Status: ACTIVE | Noted: 2020-10-15

## 2020-10-15 PROBLEM — I48.91 ATRIAL FIBRILLATION AND FLUTTER (HCC): Status: ACTIVE | Noted: 2020-10-15

## 2020-10-15 LAB
ALBUMIN SERPL BCP-MCNC: 3.7 G/DL (ref 3.2–4.9)
ALBUMIN/GLOB SERPL: 1.5 G/DL
ALP SERPL-CCNC: 104 U/L (ref 30–99)
ALT SERPL-CCNC: 16 U/L (ref 2–50)
ANION GAP SERPL CALC-SCNC: 7 MMOL/L (ref 7–16)
AST SERPL-CCNC: 19 U/L (ref 12–45)
BASOPHILS # BLD AUTO: 1.6 % (ref 0–1.8)
BASOPHILS # BLD: 0.07 K/UL (ref 0–0.12)
BILIRUB SERPL-MCNC: 0.6 MG/DL (ref 0.1–1.5)
BUN SERPL-MCNC: 14 MG/DL (ref 8–22)
CALCIUM SERPL-MCNC: 8.8 MG/DL (ref 8.5–10.5)
CHLORIDE SERPL-SCNC: 105 MMOL/L (ref 96–112)
CO2 SERPL-SCNC: 27 MMOL/L (ref 20–33)
CREAT SERPL-MCNC: 0.88 MG/DL (ref 0.5–1.4)
EOSINOPHIL # BLD AUTO: 0 K/UL (ref 0–0.51)
EOSINOPHIL NFR BLD: 0 % (ref 0–6.9)
ERYTHROCYTE [DISTWIDTH] IN BLOOD BY AUTOMATED COUNT: 51 FL (ref 35.9–50)
FOLATE SERPL-MCNC: 11.9 NG/ML
GLOBULIN SER CALC-MCNC: 2.5 G/DL (ref 1.9–3.5)
GLUCOSE SERPL-MCNC: 83 MG/DL (ref 65–99)
HCT VFR BLD AUTO: 38.1 % (ref 37–47)
HGB BLD-MCNC: 12.5 G/DL (ref 12–16)
IMM GRANULOCYTES # BLD AUTO: 0.02 K/UL (ref 0–0.11)
IMM GRANULOCYTES NFR BLD AUTO: 0.5 % (ref 0–0.9)
LV EJECT FRACT  99904: 55
LV EJECT FRACT MOD 2C 99903: 40.84
LV EJECT FRACT MOD 4C 99902: 59.81
LV EJECT FRACT MOD BP 99901: 47.12
LYMPHOCYTES # BLD AUTO: 1.03 K/UL (ref 1–4.8)
LYMPHOCYTES NFR BLD: 23.2 % (ref 22–41)
MCH RBC QN AUTO: 30.8 PG (ref 27–33)
MCHC RBC AUTO-ENTMCNC: 32.8 G/DL (ref 33.6–35)
MCV RBC AUTO: 93.8 FL (ref 81.4–97.8)
MONOCYTES # BLD AUTO: 0.49 K/UL (ref 0–0.85)
MONOCYTES NFR BLD AUTO: 11 % (ref 0–13.4)
NEUTROPHILS # BLD AUTO: 2.83 K/UL (ref 2–7.15)
NEUTROPHILS NFR BLD: 63.7 % (ref 44–72)
NRBC # BLD AUTO: 0 K/UL
NRBC BLD-RTO: 0 /100 WBC
PLATELET # BLD AUTO: 132 K/UL (ref 164–446)
PMV BLD AUTO: 10.9 FL (ref 9–12.9)
POTASSIUM SERPL-SCNC: 4.1 MMOL/L (ref 3.6–5.5)
PROT SERPL-MCNC: 6.2 G/DL (ref 6–8.2)
RBC # BLD AUTO: 4.06 M/UL (ref 4.2–5.4)
SODIUM SERPL-SCNC: 139 MMOL/L (ref 135–145)
T3FREE SERPL-MCNC: 1.69 PG/ML (ref 2–4.4)
T4 FREE SERPL-MCNC: 1.52 NG/DL (ref 0.93–1.7)
VIT B12 SERPL-MCNC: 263 PG/ML (ref 211–911)
WBC # BLD AUTO: 4.4 K/UL (ref 4.8–10.8)

## 2020-10-15 PROCEDURE — 80053 COMPREHEN METABOLIC PANEL: CPT

## 2020-10-15 PROCEDURE — 84481 FREE ASSAY (FT-3): CPT

## 2020-10-15 PROCEDURE — 70547 MR ANGIOGRAPHY NECK W/O DYE: CPT

## 2020-10-15 PROCEDURE — G0378 HOSPITAL OBSERVATION PER HR: HCPCS

## 2020-10-15 PROCEDURE — 70551 MRI BRAIN STEM W/O DYE: CPT

## 2020-10-15 PROCEDURE — 700105 HCHG RX REV CODE 258: Performed by: STUDENT IN AN ORGANIZED HEALTH CARE EDUCATION/TRAINING PROGRAM

## 2020-10-15 PROCEDURE — 700102 HCHG RX REV CODE 250 W/ 637 OVERRIDE(OP): Performed by: STUDENT IN AN ORGANIZED HEALTH CARE EDUCATION/TRAINING PROGRAM

## 2020-10-15 PROCEDURE — 85025 COMPLETE CBC W/AUTO DIFF WBC: CPT

## 2020-10-15 PROCEDURE — 99220 PR INITIAL OBSERVATION CARE,LEVL III: CPT | Mod: GC | Performed by: HOSPITALIST

## 2020-10-15 PROCEDURE — 84439 ASSAY OF FREE THYROXINE: CPT

## 2020-10-15 PROCEDURE — A9270 NON-COVERED ITEM OR SERVICE: HCPCS | Performed by: STUDENT IN AN ORGANIZED HEALTH CARE EDUCATION/TRAINING PROGRAM

## 2020-10-15 PROCEDURE — 93306 TTE W/DOPPLER COMPLETE: CPT

## 2020-10-15 PROCEDURE — 36415 COLL VENOUS BLD VENIPUNCTURE: CPT

## 2020-10-15 PROCEDURE — 93306 TTE W/DOPPLER COMPLETE: CPT | Mod: 26 | Performed by: INTERNAL MEDICINE

## 2020-10-15 PROCEDURE — 70544 MR ANGIOGRAPHY HEAD W/O DYE: CPT

## 2020-10-15 RX ORDER — NICOTINE 21 MG/24HR
21 PATCH, TRANSDERMAL 24 HOURS TRANSDERMAL
Status: DISCONTINUED | OUTPATIENT
Start: 2020-10-15 | End: 2020-10-16 | Stop reason: HOSPADM

## 2020-10-15 RX ADMIN — ATORVASTATIN CALCIUM 40 MG: 40 TABLET, FILM COATED ORAL at 05:39

## 2020-10-15 RX ADMIN — ACETAMINOPHEN 650 MG: 325 TABLET, FILM COATED ORAL at 22:20

## 2020-10-15 RX ADMIN — GABAPENTIN 100 MG: 100 CAPSULE ORAL at 05:37

## 2020-10-15 RX ADMIN — SODIUM CHLORIDE, POTASSIUM CHLORIDE, SODIUM LACTATE AND CALCIUM CHLORIDE: 600; 310; 30; 20 INJECTION, SOLUTION INTRAVENOUS at 09:56

## 2020-10-15 RX ADMIN — FLUTICASONE PROPIONATE 88 MCG: 44 AEROSOL, METERED RESPIRATORY (INHALATION) at 05:37

## 2020-10-15 RX ADMIN — DOCUSATE SODIUM 50 MG AND SENNOSIDES 8.6 MG 2 TABLET: 8.6; 5 TABLET, FILM COATED ORAL at 05:37

## 2020-10-15 RX ADMIN — UMECLIDINIUM BROMIDE AND VILANTEROL TRIFENATATE 1 PUFF: 62.5; 25 POWDER RESPIRATORY (INHALATION) at 05:37

## 2020-10-15 RX ADMIN — NICOTINE TRANSDERMAL SYSTEM 21 MG: 21 PATCH, EXTENDED RELEASE TRANSDERMAL at 05:38

## 2020-10-15 RX ADMIN — ASPIRIN 81 MG: 81 TABLET, COATED ORAL at 05:38

## 2020-10-15 RX ADMIN — LEVOTHYROXINE SODIUM 112 MCG: 0.11 TABLET ORAL at 05:39

## 2020-10-15 RX ADMIN — OMEPRAZOLE 20 MG: 20 CAPSULE, DELAYED RELEASE ORAL at 05:37

## 2020-10-15 RX ADMIN — DOCUSATE SODIUM 50 MG AND SENNOSIDES 8.6 MG 2 TABLET: 8.6; 5 TABLET, FILM COATED ORAL at 17:23

## 2020-10-15 ASSESSMENT — ENCOUNTER SYMPTOMS
FALLS: 0
BLURRED VISION: 0
DOUBLE VISION: 0
SORE THROAT: 0
PALPITATIONS: 0
LOSS OF CONSCIOUSNESS: 0
HEADACHES: 0
TREMORS: 0
BLOOD IN STOOL: 0
PHOTOPHOBIA: 0
FEVER: 0
WEAKNESS: 1
NERVOUS/ANXIOUS: 0
DIAPHORESIS: 0
CHILLS: 0
SENSORY CHANGE: 0
ORTHOPNEA: 0
VOMITING: 0
FOCAL WEAKNESS: 0
ABDOMINAL PAIN: 0
BACK PAIN: 1
TINGLING: 0
WEIGHT LOSS: 0
SHORTNESS OF BREATH: 1
SPEECH CHANGE: 0
NAUSEA: 0
SEIZURES: 0
DIARRHEA: 0

## 2020-10-15 ASSESSMENT — LIFESTYLE VARIABLES: SUBSTANCE_ABUSE: 0

## 2020-10-15 NOTE — ASSESSMENT & PLAN NOTE
Noted per CTA thoracoabdominal June 2019.  Follows with primary care physician and to f/up with surgeon later this month.  -Continue home aspirin, Lipitor

## 2020-10-15 NOTE — ASSESSMENT & PLAN NOTE
Chronic, stable, BUN/creatinine near baseline.  -Gentle IV fluids  -avoid nephrotoxic agents, renally dose medications

## 2020-10-15 NOTE — ASSESSMENT & PLAN NOTE
No chest pain or symptoms of ACS.  EKG unremarkable for acute ischemic changes.  Initial troponin elevated at 26, decreased to 23, likely secondary to demand ischemia.  -on telemetry  -Continue to monitor

## 2020-10-15 NOTE — DIETARY
"Nutrition services: Day 0 of admit.  Mary Murrieta is a 87 y.o. female with admitting DX of Postural dizziness with presyncope    Consult received for MST 3 (14-23 lb weight loss over 6 months)    Attempted interview, pt sleeping soundly (did not wake when name called x2). Call to MD requesting supplement order for Boost Plus with meals, MD agreeable.     Assessment:  Height: 157.5 cm (5' 2\")  Weight: 42.4 kg (93 lb 7.6 oz)  Body mass index is 17.1 kg/m²., BMI classification: Underweight  Diet/Intake: Regular    Evaluation:   1. Weight trend per chart review:  · 92 lb (11/21/19), weight appears stable x11 months per louise review  · 91.3 lb (3/2/20)   2. PMH: COPD, Emphysema, GERD, HLD, PCM  3. Labs: Chem panel generally WNL  4. Meds: Lipitor  5. LR @ 75 mL/hr  6. Limited NFPE d/t pt sleeping with a blanket: observed moderately depressed temples, moderate buccal fat loss    Malnutrition Risk: Pt with moderate malnutrition in the context of chronic illness related to COPD as evidenced by moderate muscle loss (depressed temple regions), moderate buccal fat loss.    Recommendations/Plan:  1. Trial Boost Plus with meals, alternate supplement per pt preference   2. Encourage intake of meals  3. Document intake of all meals as % taken in ADL's to provide interdisciplinary communication across all shifts.   4. Monitor weight.  5. Nutrition rep will continue to see patient for ongoing meal and snack preferences.     RD following for malnutrition interview and adequate PO intake        "

## 2020-10-15 NOTE — PROGRESS NOTES
2 RN Skin Check    2 RN skin check complete with MARINO Frias.   Devices in place: n/a.  Skin assessed under devices: N\A.  Confirmed pressure ulcers found on: n/a.  New potential pressure ulcers noted on n/a. Wound consult placed N/A.  The following interventions in place Pillows and Lotion.    Pt heels red and blanching.   Pt elbows and sacrum pink and blanching.   Generalized redness and bruising. No wounds.

## 2020-10-15 NOTE — ASSESSMENT & PLAN NOTE
Current smoker 1 pack/day.  Counseled on the importance of tobacco cessation.  -Nicotine replacement per protocol

## 2020-10-15 NOTE — CARE PLAN
Problem: Respiratory:  Goal: Respiratory status will improve  Outcome: PROGRESSING AS EXPECTED     Problem: Fluid Volume:  Goal: Will maintain balanced intake and output  Outcome: PROGRESSING AS EXPECTED     Problem: Communication  Goal: The ability to communicate needs accurately and effectively will improve  Outcome: PROGRESSING AS EXPECTED     Problem: Safety  Goal: Will remain free from injury  Outcome: PROGRESSING AS EXPECTED  Goal: Will remain free from falls  Outcome: PROGRESSING AS EXPECTED     Problem: Infection  Goal: Will remain free from infection  Outcome: PROGRESSING AS EXPECTED     Problem: Venous Thromboembolism (VTW)/Deep Vein Thrombosis (DVT) Prevention:  Goal: Patient will participate in Venous Thrombosis (VTE)/Deep Vein Thrombosis (DVT)Prevention Measures  Outcome: PROGRESSING AS EXPECTED     Problem: Bowel/Gastric:  Goal: Normal bowel function is maintained or improved  Outcome: PROGRESSING AS EXPECTED  Goal: Will not experience complications related to bowel motility  Outcome: PROGRESSING AS EXPECTED     Problem: Knowledge Deficit  Goal: Knowledge of disease process/condition, treatment plan, diagnostic tests, and medications will improve  Outcome: PROGRESSING AS EXPECTED  Goal: Knowledge of the prescribed therapeutic regimen will improve  Outcome: PROGRESSING AS EXPECTED     Problem: Discharge Barriers/Planning  Goal: Patient's continuum of care needs will be met  Outcome: PROGRESSING AS EXPECTED

## 2020-10-15 NOTE — PROGRESS NOTES
Pt received from ED. Tele monitor in place. VSS. Pt AxO X4, pain 0. Pt oriented to room. Educated on use of the call light. Pt demonstrated use of the call light. Discussed POC. All questions answered.

## 2020-10-15 NOTE — ASSESSMENT & PLAN NOTE
New onset atrial fibrillation and possible flutter noted on EKG.  Return to sinus rhythm on telemetry while in the ED.  [id number] score 4, candidate for anticoagulation versus risk of fall and possible bleed.  -On telemetry, evaluated paroxysmal versus continuous arrhythmia  -Consider anticoagulation risks versus benefits and patient preference

## 2020-10-15 NOTE — ASSESSMENT & PLAN NOTE
History of subjective weakness over the same time course.  States a concurrent decreasing oral intake.  States she just is not hungry.  Possible contributions from hypothyroidism.  Physical exam normal upper and lower strength, sensation, reflexes, ambulation in the emergency room normal.  B12 folate within normal limits.  -PT OT evaluation  -See hypothyroidism

## 2020-10-15 NOTE — CARE PLAN
Problem: Nutritional:  Goal: Achieve adequate nutritional intake  Description: Patient will consume 50% of meals/supplements  Outcome: NOT MET     See RD note, no POs documented yet.

## 2020-10-15 NOTE — ASSESSMENT & PLAN NOTE
Chronic, does not requires home O2. Current smoker.  Compliant with Trelegy and albuterol as needed.  -Continue home COPD medications

## 2020-10-15 NOTE — ASSESSMENT & PLAN NOTE
Diagnosis reviewed DEXA scan April 2018.  Patient reports not taking alendronic acid.  -Continue vitamin D, follow-up outpatient with primary care provider regarding alendronic acid

## 2020-10-15 NOTE — CARE PLAN
Problem: Respiratory:  Goal: Respiratory status will improve  Outcome: PROGRESSING AS EXPECTED   Pt on RA with O2 saturation in the 90s which is pt baseline.   Problem: Safety  Goal: Will remain free from falls  Outcome: PROGRESSING AS EXPECTED   Safety precautions reviewed with pt, pt verbalized understanding and denies questions.  Pt demonstrated ability to use call light for assistance.

## 2020-10-15 NOTE — ASSESSMENT & PLAN NOTE
History of essential hypertension, not on any home antihypertensives.  Initially presented with hypertension.  Possibly exacerbated by hypothyroidism.  -See hypothyroidism  -Continue to monitor, if hypertension persists consider starting antihypertensives

## 2020-10-15 NOTE — ASSESSMENT & PLAN NOTE
TSH elevated from traditional normative range, indicating possible hypothyroidism.  Of note, current recommendations suggest age-adjusted reference ranges, for example 97.5 percentile for TSH in adults aged 20-29 years versus over 80 loss 3.56 and 7.49 mU/L respectively ([email or web address]).  Thus far the patient's age, her TSH to be within normal limits.  In the context of her current symptoms, hypertension, weakness, decreased appetite/fatigue, increasing thyroid medication might be appropriate despite normal for patient's age adjusted range.  -Consider adjusting increasing by 12 mcg/day and then evaluating change over next 3 weeks via follow-up with outpatient PCP

## 2020-10-15 NOTE — H&P
"History & Physical Note    Date of Admission: 10/15/2020  Admission Status: Observation-Outpatient  [facility] Team: [facility] Team  Attending: Erica Maxwell M.D.   Senior Resident: Dr. Maxwell  Intern: Dr. oFnseca  Contact Number: [phone]    Chief Complaint: Lightheadedness    History of Present Illness (HPI):  Mary \"Sam" is a 87 y.o. female who presented 10/14/2020 with 1 week history of generalized weakness and lightheadedness.  Past medical history significant for COPD, hypothyroidism, hypertension, aortic aneurysm, CKD stage III.    Patient states that last Wednesday she felt lightheaded.  She states that upon waking up, she she was not lightheaded, but when she sat up and then stood up out of bed she became lightheaded.  She denies any dizziness, or a feeling of the room spinning, but states she only felt lightheaded.  She has not fallen, or lost consciousness. She states that symptoms are at their worst with ambulation, and exacerbated by standing from a sitting position, or sitting from a lying down position.  She denies any current difficulty speaking, headache, nausea, vomiting, hearing loss, changes in all medications, chest pain, palpitations, recent illness, cough, sick contacts.  She states that over the next 2 days symptoms subsided.  And then last night (10/13) she got up from the bathroom and again felt lightheaded.  She reported the same feeling without any associated aforementioned symptoms.  She decided to come into the emergency room today when symptoms did not resolve.    She describes a concurrent timeline of generalized weakness.  She states the weakness might of happened a day or 2 prior, denies any focal weakness, sensation loss.  States that she now requires 2 wheeled walker for ambulation was previously she did not need any assistive devices.  She states that she has been eating less for the last 2 weeks, secondary to decreased hunger.  She denies any changes in bowel movements, diarrhea, " changes in urination, abdominal pain.  States her shortness of breath is at baseline, she is a current smoker with COPD not requiring home oxygen, and continues to get short of breath while walking to the mailbox.  She states that her exercise is limited due to her shortness of breath.  She additionally states that she has had minor balance issues over the last year or so and is unsure why.  She stated consist primarily to meals, cereal in the morning, and microwave meals in the evening if her daughter don't bring her any leftovers.    In the ED patient was initially hypertensive, 145-121 which decreased to  145/80, vitals otherwise within normal limits.  Labs notable for BUN/creatinine 15/0.96 (baseline 0.9), WBC 4.7, platelet 141, troponin 26, , chest x-ray showed no acute cardiopulmonary processes.  EKG showed new onset atrial fibrillation and possible a flutter.  The patient was admitted to telemetry for observation.    Review of Systems:   Review of Systems   Constitutional: Negative for chills, diaphoresis, fever and weight loss.   HENT: Positive for hearing loss (Baseline, no acute changes). Negative for ear pain, sore throat and tinnitus.    Eyes: Negative for blurred vision, double vision and photophobia.   Respiratory: Positive for shortness of breath.    Cardiovascular: Negative for chest pain, palpitations and orthopnea.   Gastrointestinal: Negative for abdominal pain, blood in stool, diarrhea, melena, nausea and vomiting.   Genitourinary: Negative for dysuria, frequency, hematuria and urgency.   Musculoskeletal: Positive for back pain (Baseline). Negative for falls.   Skin: Negative for itching and rash.   Neurological: Positive for weakness. Negative for tingling, tremors, sensory change, speech change, focal weakness, seizures, loss of consciousness and headaches.        Lightheadedness   Psychiatric/Behavioral: Negative for substance abuse. The patient is not nervous/anxious.        Past  Medical History:   Past Medical History was reviewed with patient.  Patient was unfamiliar with most of her past medical history, but recalled COPD, hypothyroidism and aortic aneurysm   has a past medical history of Aortic aneurysm without rupture (HCC), Atherosclerosis of aorta (HCC) (3/27/2019), Cataract, Compression fracture of fourth lumbar vertebra (HCC) (3/28/2018), Dental disorder (03/20/2018), Emphysema of lung (HCC), Gastroesophageal reflux disease without esophagitis (5/11/2018), Hiatus hernia syndrome, Hypothyroidism (2/8/2016), Mixed hyperlipidemia (12/7/2016), and Protein-calorie malnutrition (HCC) (3/28/2018).    Past Surgical History: Past Surgical History was reviewed with patient.   has a past surgical history that includes tonsillectomy (1938); thyroidectomy; and abdominal hysterectomy total (1986).    Medications: Medications have been reviewed with patient.  Prior to Admission Medications   Prescriptions Last Dose Informant Patient Reported? Taking?   Fluticasone-Umeclidin-Vilant 100-62.5-25 MCG/INH AEROSOL POWDER, BREATH ACTIVATED 10/14/2020 at AM Patient No No   Sig: Inhale 1 Puff by mouth every day.   Nutritional Supplements (VITAMIN D BOOSTER PO) 10/14/2020 at AM Patient Yes No   Sig: Take 1 Tab by mouth every day.   albuterol 108 (90 Base) MCG/ACT Aero Soln inhalation aerosol PRN at PRN Patient No No   Sig: Inhale 2 Puffs by mouth every 6 hours as needed for Shortness of Breath.   alendronate (FOSAMAX) 70 MG Tab 10/12/2020 at AM Patient No No   Sig: Take 1 Tab by mouth every 7 days.   aspirin EC (ECOTRIN) 81 MG Tablet Delayed Response 10/14/2020 at AM Patient Yes No   Sig: Take 81 mg by mouth every day.   atorvastatin (LIPITOR) 40 MG Tab 10/14/2020 at AM Patient No No   Sig: Take 1 Tab by mouth every day.   fluticasone (FLONASE) 50 MCG/ACT nasal spray Not Taking at Unknown time Patient No No   Sig: Spray 1 Spray in nose every day.   Patient not taking: Reported on 10/14/2020   gabapentin  (NEURONTIN) 100 MG Cap 10/14/2020 at AM Patient No No   Sig: Take 1 Cap by mouth every day.   levothyroxine (SYNTHROID) 112 MCG Tab 10/14/2020 at AM Patient No No   Sig: Take 1 Tab by mouth Every morning on an empty stomach.   pantoprazole (PROTONIX) 40 MG Tablet Delayed Response 10/14/2020 at AM Patient No No   Sig: Take 1 Tab by mouth every day.      Facility-Administered Medications: None        Allergies: Allergies have been reviewed with patient.  No Known Allergies    Family History:   Dad: Stroke, lung cancer was a smoker  Mom esophageal cancer  Sister: Lupus, GI and pulmonary issues specifics unknown  Children: 1 with COPD  family history includes Cancer in her father and mother; Lung Disease in her sister; Stroke in her father.     Social History:   Tobacco: Current smoker 1 pack daily for the last 10 years, previously half a pack for 30 years, 25-pack-year history  Alcohol: States has not drank in the last 2 years, occasionally drinks wine with dinner prior  Recreational drugs (illegal and prescription): Denies ever  Employment: Previously worked in the [employer]  Activity Level: Active/ambulatory  Living situation: Lives alone in [place]  Recent travel: Denies  Primary Care Provider: lidya Chaudhari M.D.  Other (stressors, spirituality, exposures): Endorsing her family  Physical Exam:  Vitals:  Temp:  [36.5 °C (97.7 °F)-36.9 °C (98.5 °F)] 36.9 °C (98.5 °F)  Pulse:  [72-92] 90  Resp:  [16-30] 16  BP: (133-165)/() 133/77  SpO2:  [93 %-96 %] 96 %    Physical Exam  Constitutional:       General: She is not in acute distress.     Appearance: Normal appearance. She is normal weight. She is not ill-appearing, toxic-appearing or diaphoretic.   HENT:      Head: Normocephalic and atraumatic.      Right Ear: External ear normal.      Left Ear: External ear normal.      Nose: Nose normal.      Mouth/Throat:      Pharynx: No oropharyngeal exudate or posterior oropharyngeal erythema.    Eyes:      General: No scleral icterus.     Extraocular Movements: Extraocular movements intact.      Pupils: Pupils are equal, round, and reactive to light.   Cardiovascular:      Rate and Rhythm: Rhythm irregular.      Pulses: Normal pulses.      Heart sounds: Normal heart sounds. No murmur. No friction rub. No gallop.       Comments: Negative orthostatics  Pulmonary:      Effort: Pulmonary effort is normal. No respiratory distress.      Breath sounds: No stridor. No wheezing, rhonchi or rales.   Abdominal:      General: Abdomen is flat. Bowel sounds are normal. There is no distension.      Palpations: Abdomen is soft.      Tenderness: There is no abdominal tenderness. There is no guarding or rebound.      Hernia: No hernia is present.   Musculoskeletal:         General: No swelling or tenderness.   Skin:     General: Skin is warm and dry.      Capillary Refill: Capillary refill takes less than 2 seconds.      Coloration: Skin is not jaundiced.   Neurological:      General: No focal deficit present.      Mental Status: She is alert. Mental status is at baseline. She is disoriented.      Cranial Nerves: No cranial nerve deficit.      Sensory: No sensory deficit.      Motor: No weakness.      Coordination: Coordination normal.      Gait: Gait normal.      Comments: Negative Ara-Hallpike   Psychiatric:         Mood and Affect: Mood normal.         Thought Content: Thought content normal.         Labs:   Results for orders placed or performed during the hospital encounter of 10/14/20   CBC WITH DIFFERENTIAL   Result Value Ref Range    WBC 4.7 (L) 4.8 - 10.8 K/uL    RBC 4.44 4.20 - 5.40 M/uL    Hemoglobin 13.9 12.0 - 16.0 g/dL    Hematocrit 42.2 37.0 - 47.0 %    MCV 95.0 81.4 - 97.8 fL    MCH 31.3 27.0 - 33.0 pg    MCHC 32.9 (L) 33.6 - 35.0 g/dL    RDW 51.6 (H) 35.9 - 50.0 fL    Platelet Count 141 (L) 164 - 446 K/uL    MPV 11.0 9.0 - 12.9 fL    Neutrophils-Polys 68.90 44.00 - 72.00 %    Lymphocytes 18.70 (L) 22.00 -  41.00 %    Monocytes 10.50 0.00 - 13.40 %    Eosinophils 0.60 0.00 - 6.90 %    Basophils 1.10 0.00 - 1.80 %    Immature Granulocytes 0.20 0.00 - 0.90 %    Nucleated RBC 0.00 /100 WBC    Neutrophils (Absolute) 3.21 2.00 - 7.15 K/uL    Lymphs (Absolute) 0.87 (L) 1.00 - 4.80 K/uL    Monos (Absolute) 0.49 0.00 - 0.85 K/uL    Eos (Absolute) 0.03 0.00 - 0.51 K/uL    Baso (Absolute) 0.05 0.00 - 0.12 K/uL    Immature Granulocytes (abs) 0.01 0.00 - 0.11 K/uL    NRBC (Absolute) 0.00 K/uL   COMP METABOLIC PANEL   Result Value Ref Range    Sodium 139 135 - 145 mmol/L    Potassium 4.0 3.6 - 5.5 mmol/L    Chloride 103 96 - 112 mmol/L    Co2 25 20 - 33 mmol/L    Anion Gap 11.0 7.0 - 16.0    Glucose 77 65 - 99 mg/dL    Bun 15 8 - 22 mg/dL    Creatinine 0.96 0.50 - 1.40 mg/dL    Calcium 9.8 8.5 - 10.5 mg/dL    AST(SGOT) 19 12 - 45 U/L    ALT(SGPT) 16 2 - 50 U/L    Alkaline Phosphatase 123 (H) 30 - 99 U/L    Total Bilirubin 0.5 0.1 - 1.5 mg/dL    Albumin 4.4 3.2 - 4.9 g/dL    Total Protein 7.4 6.0 - 8.2 g/dL    Globulin 3.0 1.9 - 3.5 g/dL    A-G Ratio 1.5 g/dL   ESTIMATED GFR   Result Value Ref Range    GFR If African American >60 >60 mL/min/1.73 m 2    GFR If Non African American 55 (A) >60 mL/min/1.73 m 2   proBrain Natriuretic Peptide, NT   Result Value Ref Range    NT-proBNP 561 (H) 0 - 125 pg/mL   TROPONIN   Result Value Ref Range    Troponin T 26 (H) 6 - 19 ng/L   COVID/SARS CoV-2 PCR    Specimen: Nasopharyngeal; Respirate   Result Value Ref Range    COVID Order Status Received    SARS-CoV-2, PCR (In-House)   Result Value Ref Range    SARS-CoV-2 Source NP Swab     SARS-CoV-2 by PCR NotDetected    TROPONIN   Result Value Ref Range    Troponin T 23 (H) 6 - 19 ng/L   TSH   Result Value Ref Range    TSH 7.180 (H) 0.380 - 5.330 uIU/mL   MAGNESIUM   Result Value Ref Range    Magnesium 2.2 1.5 - 2.5 mg/dL   VITAMIN B12   Result Value Ref Range    Vitamin B12 -True Cobalamin 263 211 - 911 pg/mL   FOLATE   Result Value Ref Range     Folate -Folic Acid 11.9 >4.0 ng/mL   EKG (NOW)   Result Value Ref Range    Report       [facility] Emergency Dept[facility]    Test Date:  2020-10-14  Pt Name:    PINA MOCTEZUMA                 Department: ER  MRN:        [id number]                      Room:  Gender:     Female                       Technician: [id number]  :        [id number]                   Requested By:ER TRIAGE PROTOCOL  Order #:    [id number]                    Reading MD: FRANCES MALDONADO MD    Measurements  Intervals                                Axis  Rate:       84                           P:  KS:                                      QRS:        67  QRSD:       116                          T:          -55  QT:         395  QTc:        467    Interpretive Statements  Atrial fibrillation  Nonspecific intraventricular conduction delay  Borderline repolarization abnormality  Compared to ECG 2019 14:09:25  Sinus rhythm no longer present  Atrial premature complex(es) no longer present  Electronically Signed On 10- 15:10:52 PDT by FRANCES MALDONADO MD     EKG   Result Value Ref Range    Report       [facility] Emergency Dept.    Test Date:  2020-10-14  Pt Name:    PINA MOCTEZUMA                 Department: ER  MRN:        [id number]                      Room:       GR 38  Gender:     Female                       Technician: [id number]  :        [id number]                   Requested By:FRANCES MALDONADO  Order #:    [id number]                    Reading MD: FRANCES MALDONADO MD    Measurements  Intervals                                Axis  Rate:       80                           P:          -50  KS:         140                          QRS:        59  QRSD:       116                          T:          45  QT:         432  QTc:        499    Interpretive Statements  UNKNOWN RHYTHM, IRREGULAR RATE  64- 99  NONSPECIFIC INTRAVENTRICULAR CONDUCTION DELAY  LEFT VENTRICULAR HYPERTROPHY  Compared to ECG 10/14/2020  13:13:23  Left ventricular hypertrophy now present  Atrial fibrillation no longer present  Electronically Signed On 10- 16:49:45 PDT by STEPH LOOMIS           EKG: Per my read, atrial fibrillation, normal axis, no acute ischemic changes.    Imaging:   CT-HEAD W/O   Final Result         1. No acute intracranial findings. No evidence of acute intracranial hemorrhage or mass lesion.      2. White matter lucencies most consistent with chronic small vessel ischemic change.               DX-CHEST-PORTABLE (1 VIEW)   Final Result      1.  Lungs are hyperinflated with changes of COPD.   2.  Linear upper lobe opacities are likely due to areas of parenchymal scarring.   3.  There is no acute failure, effusion, or pneumonia.      EC-ECHOCARDIOGRAM COMPLETE W/O CONT    (Results Pending)         Previous Data Review: reviewed    Problem Representation: 37-year-old female with past medical history of COPD, hypothyroidism, hypertension, aortic aneurysm without rupture, CKD stage III, who presents with 1 week history of generalized weakness and lightheadedness without LOC or falls, lightheadedness is associated with standing from a sitting position per history.  Negative orthostatics, A. fib/possible a flutter noted during EKG.    * Lightheadedness  Assessment & Plan  Patient presents with episodic lightheadedness over the last week, consistently associated with standing from a sitting position. Presentation classical for orthostatic hypotension, but orthostatics were negative.  Patient's only associated symptoms are generalized weakness, no dysarthria, headache, nausea vomiting, hearing loss, changes in home medications.  No abnormalities on eye exam, Ara-Hallpike maneuver negative.  EKG notable for atrial fibrillation, with possible flutter waves noted on rhythm strip lead II.  Most likely etiology at this point is arrhythmia possibly exacerbated by hypothyroidism. Less likely BPH, Ménière's, vestibular migraine,  vestibular neuritis or posterior circulation stroke.  Psych etiology cannot be ruled out at this time, but unlikely. Echocardiogram 3/3/19 unremarkable, EF 65%.  -On telemetry  -PT OT evaluation  -Echocardiogram to further cardiogenic etiologies contributing to presentation    Weakness  Assessment & Plan  History of subjective weakness over the same time course.  States a concurrent decreasing oral intake.  States she just is not hungry.  Possible contributions from hypothyroidism.  Physical exam normal upper and lower strength, sensation, reflexes, ambulation in the emergency room normal.  B12 folate within normal limits.  -PT OT evaluation  -See hypothyroidism      Hypothyroidism- (present on admission)  Assessment & Plan  TSH elevated from traditional normative range, indicating possible hypothyroidism.  Of note, current recommendations suggest age-adjusted reference ranges, for example 97.5 percentile for TSH in adults aged 20-29 years versus over 80 loss 3.56 and 7.49 mU/L respectively ([id number]).  Thus far the patient's age, her TSH to be within normal limits.  In the context of her current symptoms, hypertension, weakness, decreased appetite/fatigue, increasing thyroid medication might be appropriate despite normal for patient's age adjusted range.  -Consider adjusting increasing by 12 mcg/day and then evaluating change over next 3 weeks via follow-up with outpatient PCP    Elevated troponin  Assessment & Plan  No chest pain or symptoms of ACS.  EKG unremarkable for acute ischemic changes.  Initial troponin elevated at 26, decreased to 23, likely secondary to demand ischemia.  -on telemetry  -Continue to monitor    Atrial fibrillation and flutter (HCC)  Assessment & Plan  New onset atrial fibrillation and possible flutter noted on EKG.  Return to sinus rhythm on telemetry while in the ED.  [id number] score 4, candidate for anticoagulation versus risk of fall and possible bleed.  -On  telemetry, evaluated paroxysmal versus continuous arrhythmia  -Consider anticoagulation risks versus benefits and patient preference    Stage 3 chronic kidney disease- (present on admission)  Assessment & Plan  Chronic, stable, BUN/creatinine near baseline.  -Gentle IV fluids  -avoid nephrotoxic agents, renally dose medications    Age-related osteoporosis without current pathological fracture- (present on admission)  Assessment & Plan  Diagnosis reviewed DEXA scan April 2018.  Patient reports not taking alendronic acid.  -Continue vitamin D, follow-up outpatient with primary care provider regarding alendronic acid    COPD (chronic obstructive pulmonary disease) (HCC)- (present on admission)  Assessment & Plan  Chronic, does not requires home O2. Current smoker.  Compliant with Trelegy and albuterol as needed.  -Continue home COPD medications    Compression fracture of fourth lumbar vertebra (HCC)- (present on admission)  Assessment & Plan  Compression fracture L4 vertebrae resulting in neuropathic pain.  -Continue home gabapentin    Aortic aneurysm without rupture (HCC)- (present on admission)  Assessment & Plan  Noted per CTA thoracoabdominal June 2019.  Follows with primary care physician and to f/up with surgeon later this month.  -Continue home aspirin, Lipitor    Current smoker- (present on admission)  Assessment & Plan  Current smoker 1 pack/day.  Counseled on the importance of tobacco cessation.  -Nicotine replacement per protocol    Elevated brain natriuretic peptide (BNP) level  Assessment & Plan  BNP on admission was 561, age-adjusted range less than 450.  Likely minor increase in the setting of chronic kidney disease.  No signs of heart failure.  -Continue to monitor for signs and symptoms of heart failure    Thrombocytopenia (HCC)- (present on admission)  Assessment & Plan  Mild thrombocytopenia, stable, at baseline, likely secondary to electrolyte abnormalities.  B12 folate within normal limits, no  acute bleeding.  -Continue to monitor    Hypertension  Assessment & Plan  History of essential hypertension, not on any home antihypertensives.  Initially presented with hypertension.  Possibly exacerbated by hypothyroidism.  -See hypothyroidism  -Continue to monitor, if hypertension persists consider starting antihypertensives

## 2020-10-15 NOTE — PROGRESS NOTES
[facility] ER REPORT  Consult/Admission requested by: Dr Cardoza  Chief complaint: Presyncope  Pertinent history/ER Course:   87-year-old female presented with presyncope and feeling like she will fall with dizziness, the patient is to be admitted to telemetry for cardiac work-up.  Also the patient has a history of COPD.       Patient meets admission criterion: Yes..  Recommendations given or work up & consultations requested per triage officer: None  Consultants involved and pertinent input from consultants: None  Admission status: Observation.   Admission order placed: To be placed by admitting physician.   Floor requested: Telemetry  Assigned hospitalist: LYLY

## 2020-10-15 NOTE — ASSESSMENT & PLAN NOTE
Patient presents with episodic lightheadedness over the last week, consistently associated with standing from a sitting position. Presentation classical for orthostatic hypotension, but orthostatics were negative.  Patient's only associated symptoms are generalized weakness, no dysarthria, headache, nausea vomiting, hearing loss, changes in home medications.  No abnormalities on eye exam, [place]-Hallpike maneuver negative.  EKG notable for atrial fibrillation, with possible flutter waves noted on rhythm strip lead II.  Most likely etiology at this point is arrhythmia possibly exacerbated by hypothyroidism. Less likely BPH, Ménière's, vestibular migraine, vestibular neuritis or posterior circulation stroke.  Psych etiology cannot be ruled out at this time, but unlikely. Echocardiogram 3/3/19 unremarkable, EF 65%.  -On telemetry  -PT OT evaluation  -Echocardiogram to further cardiogenic etiologies contributing to presentation

## 2020-10-15 NOTE — ASSESSMENT & PLAN NOTE
BNP on admission was 561, age-adjusted range less than 450.  Likely minor increase in the setting of chronic kidney disease.  No signs of heart failure.  -Continue to monitor for signs and symptoms of heart failure

## 2020-10-15 NOTE — ASSESSMENT & PLAN NOTE
Mild thrombocytopenia, stable, at baseline, likely secondary to electrolyte abnormalities.  B12 folate within normal limits, no acute bleeding.  -Continue to monitor

## 2020-10-16 VITALS
HEART RATE: 80 BPM | WEIGHT: 93.47 LBS | DIASTOLIC BLOOD PRESSURE: 68 MMHG | TEMPERATURE: 98.6 F | SYSTOLIC BLOOD PRESSURE: 128 MMHG | OXYGEN SATURATION: 95 % | RESPIRATION RATE: 16 BRPM | BODY MASS INDEX: 17.2 KG/M2 | HEIGHT: 62 IN

## 2020-10-16 PROCEDURE — 99217 PR OBSERVATION CARE DISCHARGE: CPT | Mod: GC | Performed by: HOSPITALIST

## 2020-10-16 PROCEDURE — G0378 HOSPITAL OBSERVATION PER HR: HCPCS

## 2020-10-16 PROCEDURE — 700102 HCHG RX REV CODE 250 W/ 637 OVERRIDE(OP): Performed by: STUDENT IN AN ORGANIZED HEALTH CARE EDUCATION/TRAINING PROGRAM

## 2020-10-16 PROCEDURE — A9270 NON-COVERED ITEM OR SERVICE: HCPCS | Performed by: STUDENT IN AN ORGANIZED HEALTH CARE EDUCATION/TRAINING PROGRAM

## 2020-10-16 PROCEDURE — 99407 BEHAV CHNG SMOKING > 10 MIN: CPT

## 2020-10-16 RX ADMIN — FLUTICASONE PROPIONATE 88 MCG: 44 AEROSOL, METERED RESPIRATORY (INHALATION) at 05:06

## 2020-10-16 RX ADMIN — UMECLIDINIUM BROMIDE AND VILANTEROL TRIFENATATE 1 PUFF: 62.5; 25 POWDER RESPIRATORY (INHALATION) at 05:06

## 2020-10-16 RX ADMIN — ASPIRIN 81 MG: 81 TABLET, COATED ORAL at 05:06

## 2020-10-16 RX ADMIN — LEVOTHYROXINE SODIUM 112 MCG: 0.11 TABLET ORAL at 05:06

## 2020-10-16 RX ADMIN — NICOTINE TRANSDERMAL SYSTEM 21 MG: 21 PATCH, EXTENDED RELEASE TRANSDERMAL at 05:06

## 2020-10-16 RX ADMIN — OMEPRAZOLE 20 MG: 20 CAPSULE, DELAYED RELEASE ORAL at 05:06

## 2020-10-16 RX ADMIN — ATORVASTATIN CALCIUM 40 MG: 40 TABLET, FILM COATED ORAL at 05:06

## 2020-10-16 NOTE — CARE PLAN
Problem: Respiratory:  Goal: Respiratory status will improve  Outcome: PROGRESSING AS EXPECTED   Pt O2 saturation in 90s on RA which is baseline.   Problem: Safety  Goal: Will remain free from falls  Outcome: PROGRESSING AS EXPECTED   Safety precautions reviewed with pt, pt verbalized understanding and denies questions.  Pt demonstrated ability to use call light for assistance.

## 2020-10-16 NOTE — CARE PLAN
Problem: Respiratory:  Goal: Respiratory status will improve  Outcome: MET   Patient on room air. No complaints of resp distress or SOB.   Problem: Communication  Goal: The ability to communicate needs accurately and effectively will improve  Outcome: PROGRESSING AS EXPECTED   Patient using call light appropriately.  Patient able to verbalize needs.  Patient encouraged and educated to use call light for assistance.

## 2020-10-16 NOTE — PROGRESS NOTES
Monitor Summary:    Rhythm: SR  Rate: 73-92   Ectopy: freq PVC/PAC, couplet PVC  Intervals: .14/.10/.38

## 2020-10-16 NOTE — PROGRESS NOTES
BSSR received from MARINO Bhatia. Patient resting in bed. No complaints or needs at this time. Bed alarm on. Will CTM.

## 2020-10-16 NOTE — RESPIRATORY CARE
"COPD EDUCATION by COPD CLINICAL EDUCATOR  (Phone: [phone])  10/16/2020 at 08:50am by Kimmie Quinones, VITA     Patient refused to participate in full program. Short intervention has been conducted. A comprehensive booklet including information about COPD, action plan and home treatment with spacer instruct .    Smoking Cessation Intervention and education completed, 3 minutes spent on smoking cessation education with patient. Provided smoking cessation packet with \"Tips to Quit\" and brochure for \"Free Smoking Cessation Classes\".     What does the patient use currently for inhalers/nebulizer at home? Trelegy, Albuterol    Has the action plan been completed or updated? completed    What are additional medications or equipment recommendations? none    What insurance is the patient enrolled in? [employer]    Have all necessary follow up appointments been scheduled?   PCP 3/30/2021     Has the patient been referred to Pulmonary Rehab? refused    Has Home Health referral been placed?   Recommended? recommended    Has the patient been referred to the [facility]? yes    Has Palliative Care Team been involved in care, been suggested or consulted? no  "

## 2020-10-17 NOTE — DISCHARGE INSTRUCTIONS
Discharge Instructions    Start taking Eliquis twice a day.  Continue taking aspirin and atorvastatin.  Discharged to home by car with relative. Discharged via wheelchair, hospital escort: Yes.  Special equipment needed: Not Applicable    Be sure to schedule a follow-up appointment with your primary care doctor or any specialists as instructed.     Discharge Plan:   Diet Plan: Discussed  Activity Level: Discussed  Confirmed Follow up Appointment: Appointment Scheduled  Confirmed Symptoms Management: Discussed  Medication Reconciliation Updated: Yes  Influenza Vaccine Indication: Not indicated: Previously immunized this influenza season and > 8 years of age    I understand that a diet low in cholesterol, fat, and sodium is recommended for good health. Unless I have been given specific instructions below for another diet, I accept this instruction as my diet prescription.   Other diet: Cardiac      · Is patient discharged on Warfarin / Coumadin?   No     Depression / Suicide Risk    As you are discharged from this [facility] facility, it is important to learn how to keep safe from harming yourself.    Recognize the warning signs:  · Abrupt changes in personality, positive or negative- including increase in energy   · Giving away possessions  · Change in eating patterns- significant weight changes-  positive or negative  · Change in sleeping patterns- unable to sleep or sleeping all the time   · Unwillingness or inability to communicate  · Depression  · Unusual sadness, discouragement and loneliness  · Talk of wanting to die  · Neglect of personal appearance   · Rebelliousness- reckless behavior  · Withdrawal from people/activities they love  · Confusion- inability to concentrate     If you or a loved one observes any of these behaviors or has concerns about self-harm, here's what you can do:  · Talk about it- your feelings and reasons for harming yourself  · Remove any means that you might use to hurt yourself  (examples: pills, rope, extension cords, firearm)  · Get professional help from the community (Mental Health, Substance Abuse, psychological counseling)  · Do not be alone:Call your Safe Contact- someone whom you trust who will be there for you.  · Call your local CRISIS HOTLINE 658-8917 or 388-631-3643  · Call your local Children's Mobile Crisis Response Team [place] (541) 042-2318 or www.VFA  · Call the toll free National Suicide Prevention Hotlines   · National Suicide Prevention Lifeline 934-434-BVNN (8832)  · National Hope Line Network 800-SUICIDE (453-1640)    Apixaban oral tablets  What is this medicine?  APIXABAN (a PIX a ban) is an anticoagulant (blood thinner). It is used to lower the chance of stroke in people with a medical condition called atrial fibrillation. It is also used to treat or prevent blood clots in the lungs or in the veins.  This medicine may be used for other purposes; ask your health care provider or pharmacist if you have questions.  COMMON BRAND NAME(S): Eliquis  What should I tell my health care provider before I take this medicine?  They need to know if you have any of these conditions:  · antiphospholipid antibody syndrome  · bleeding disorders  · bleeding in the brain  · blood in your stools (black or tarry stools) or if you have blood in your vomit  · history of blood clots  · history of stomach bleeding  · kidney disease  · liver disease  · mechanical heart valve  · an unusual or allergic reaction to apixaban, other medicines, foods, dyes, or preservatives  · pregnant or trying to get pregnant  · breast-feeding  How should I use this medicine?  Take this medicine by mouth with a glass of water. Follow the directions on the prescription label. You can take it with or without food. If it upsets your stomach, take it with food. Take your medicine at regular intervals. Do not take it more often than directed. Do not stop taking except on your doctor's advice.  Stopping this medicine may increase your risk of a blood clot. Be sure to refill your prescription before you run out of medicine.  Talk to your pediatrician regarding the use of this medicine in children. Special care may be needed.  Overdosage: If you think you have taken too much of this medicine contact a poison control center or emergency room at once.  NOTE: This medicine is only for you. Do not share this medicine with others.  What if I miss a dose?  If you miss a dose, take it as soon as you can. If it is almost time for your next dose, take only that dose. Do not take double or extra doses.  What may interact with this medicine?  This medicine may interact with the following:  · aspirin and aspirin-like medicines  · certain medicines for fungal infections like ketoconazole and itraconazole  · certain medicines for seizures like carbamazepine and phenytoin  · certain medicines that treat or prevent blood clots like warfarin, enoxaparin, and dalteparin  · clarithromycin  · NSAIDs, medicines for pain and inflammation, like ibuprofen or naproxen  · rifampin  · ritonavir  · Burgin's wort  This list may not describe all possible interactions. Give your health care provider a list of all the medicines, herbs, non-prescription drugs, or dietary supplements you use. Also tell them if you smoke, drink alcohol, or use illegal drugs. Some items may interact with your medicine.  What should I watch for while using this medicine?  Visit your healthcare professional for regular checks on your progress. You may need blood work done while you are taking this medicine. Your condition will be monitored carefully while you are receiving this medicine. It is important not to miss any appointments.  Avoid sports and activities that might cause injury while you are using this medicine. Severe falls or injuries can cause unseen bleeding. Be careful when using sharp tools or knives. Consider using an electric razor. Take special  care brushing or flossing your teeth. Report any injuries, bruising, or red spots on the skin to your healthcare professional.  If you are going to need surgery or other procedure, tell your healthcare professional that you are taking this medicine.  Wear a medical ID bracelet or chain. Carry a card that describes your disease and details of your medicine and dosage times.  What side effects may I notice from receiving this medicine?  Side effects that you should report to your doctor or health care professional as soon as possible:  · allergic reactions like skin rash, itching or hives, swelling of the face, lips, or tongue  · signs and symptoms of bleeding such as bloody or black, tarry stools; red or dark-brown urine; spitting up blood or brown material that looks like coffee grounds; red spots on the skin; unusual bruising or bleeding from the eye, gums, or nose  · signs and symptoms of a blood clot such as chest pain; shortness of breath; pain, swelling, or warmth in the leg  · signs and symptoms of a stroke such as changes in vision; confusion; trouble speaking or understanding; severe headaches; sudden numbness or weakness of the face, arm or leg; trouble walking; dizziness; loss of coordination  This list may not describe all possible side effects. Call your doctor for medical advice about side effects. You may report side effects to FDA at 0-723-DWX-0464.  Where should I keep my medicine?  Keep out of the reach of children.  Store at room temperature between 20 and 25 degrees C (68 and 77 degrees F). Throw away any unused medicine after the expiration date.  NOTE: This sheet is a summary. It may not cover all possible information. If you have questions about this medicine, talk to your doctor, pharmacist, or health care provider.  © 2020 Elsevier/Gold Standard (2019-08-28 17:39:34)      Dizziness  Dizziness is a common problem. It makes you feel unsteady or light-headed. You may feel like you are about to  pass out (faint). Dizziness can lead to getting hurt if you stumble or fall. Dizziness can be caused by many things, including:  · Medicines.  · Not having enough water in your body (dehydration).  · Illness.  Follow these instructions at home:  Eating and drinking    · Drink enough fluid to keep your pee (urine) clear or pale yellow. This helps to keep you from getting dehydrated. Try to drink more clear fluids, such as water.  · Do not drink alcohol.  · Limit how much caffeine you drink or eat, if your doctor tells you to do that.  · Limit how much salt (sodium) you drink or eat, if your doctor tells you to do that.  Activity    · Avoid making quick movements.  ? When you stand up from sitting in a chair, steady yourself until you feel okay.  ? In the morning, first sit up on the side of the bed. When you feel okay, stand slowly while you hold onto something. Do this until you know that your balance is fine.  · If you need to  one place for a long time, move your legs often. Tighten and relax the muscles in your legs while you are standing.  · Do not drive or use heavy machinery if you feel dizzy.  · Avoid bending down if you feel dizzy. Place items in your home so you can reach them easily without leaning over.  Lifestyle  · Do not use any products that contain nicotine or tobacco, such as cigarettes and e-cigarettes. If you need help quitting, ask your doctor.  · Try to lower your stress level. You can do this by using methods such as yoga or meditation. Talk with your doctor if you need help.  General instructions  · Watch your dizziness for any changes.  · Take over-the-counter and prescription medicines only as told by your doctor. Talk with your doctor if you think that you are dizzy because of a medicine that you are taking.  · Tell a friend or a family member that you are feeling dizzy. If he or she notices any changes in your behavior, have this person call your doctor.  · Keep all follow-up  visits as told by your doctor. This is important.  Contact a doctor if:  · Your dizziness does not go away.  · Your dizziness or light-headedness gets worse.  · You feel sick to your stomach (nauseous).  · You have trouble hearing.  · You have new symptoms.  · You are unsteady on your feet.  · You feel like the room is spinning.  Get help right away if:  · You throw up (vomit) or have watery poop (diarrhea), and you cannot eat or drink anything.  · You have trouble:  ? Talking.  ? Walking.  ? Swallowing.  ? Using your arms, hands, or legs.  · You feel generally weak.  · You are not thinking clearly, or you have trouble forming sentences. A friend or family member may notice this.  · You have:  ? Chest pain.  ? Pain in your belly (abdomen).  ? Shortness of breath.  ? Sweating.  · Your vision changes.  · You are bleeding.  · You have a very bad headache.  · You have neck pain or a stiff neck.  · You have a fever.  These symptoms may be an emergency. Do not wait to see if the symptoms will go away. Get medical help right away. Call your local emergency services (911 in the [place] Do not drive yourself to the hospital.  Summary  · Dizziness makes you feel unsteady or light-headed. You may feel like you are about to pass out (faint).  · Drink enough fluid to keep your pee (urine) clear or pale yellow. Do not drink alcohol.  · Avoid making quick movements if you feel dizzy.  · Watch your dizziness for any changes.  This information is not intended to replace advice given to you by your health care provider. Make sure you discuss any questions you have with your health care provider.  Document Released: 12/06/2012 Document Revised: 12/21/2018 Document Reviewed: 01/04/2018  [employer] [place] Patient Education © 2020 Elsevier Inc.      Bleeding Precautions When on Anticoagulant Therapy, Adult  Anticoagulant therapy, also called blood thinner therapy, is medicine that helps to prevent and treat blood clots. The medicine works by  stopping blood clots from forming or growing. Blood clots that form in your blood vessels can be dangerous. They can break loose and travel to the heart, lungs, or brain. This increases the risk of a heart attack, stroke, or blocked lung artery (pulmonary embolism).  Anticoagulants also increase the risk of bleeding. Try to protect yourself from cuts and other injuries that can cause bleeding. It is important to take anticoagulants exactly as told by your health care provider.  Why do I need to be on anticoagulant therapy?  You may need this medicine if you are at risk of developing a blood clot. Conditions that increase your risk of a blood clot include:  · Being born with heart disease or a heart malformation (congenital heart disease).  · Developing heart disease.  · Having had surgery, such as valve replacement.  · Having had a serious accident or other type of severe injury (trauma).  · Having certain types of cancer.  · Having certain diseases that can increase blood clotting.  · Having a high risk of stroke or heart attack.  · Having atrial fibrillation (AF).  What are the common anticoagulant medicines?  There are several types of anticoagulant medicines. The most common types are:  · Medicines that you take by mouth (oral medicines), such as:  ? Warfarin.  ? Novel oral anticoagulants (NOACs), such as:  ? Direct thrombin inhibitors (dabigatran).  ? Factor Xa inhibitors (apixaban, edoxaban, and rivaroxaban).  · Injections, such as:  ? Unfractionated heparin.  ? Low molecular weight heparin.  These anticoagulants work in different ways to prevent blood clots. They also have different risks and side effects.  What do I need to remember while on anticoagulant therapy?  Taking anticoagulants  · Take your medicine at the same time every day. If you forget to take your medicine, take it as soon as you remember. Do not double your dosage of medicine if you miss a whole day. Take your normal dose and call your health  care provider.  · Do not stop taking your medicine unless your health care provider approves. Stopping the medicine can increase your risk of developing a blood clot.  Taking other medicines  · Take over-the-counter and prescriptions medicines only as told by your health care provider.  · Do not take over-the-counter NSAIDs, including aspirin and ibuprofen, while you are on anticoagulant therapy. These medicines increase your risk of dangerous bleeding.  · Get approval from your health care provider before you start taking any new medicines, vitamins, or herbal products. Some of these could interfere with your therapy.  General instructions  · Keep all follow-up visits as told by your health care provider. This is important.  · If you are pregnant or trying to get pregnant, talk with a health care provider about anticoagulants. Some of these medicines are not safe to take during pregnancy.  · Tell all health care providers, including your dentist, that you are on anticoagulant therapy. It is especially important to tell providers before you have any surgery, medical procedures, or dental work done.  What precautions should I take?    · Be very careful when using knives, scissors, or other sharp objects.  · Use an electric razor instead of a blade.  · Do not use toothpicks.  · Use a soft-bristled toothbrush. Brush your teeth gently.  · Always wear shoes outdoors and wear slippers indoors.  · Be careful when cutting your fingernails and toenails.  · Place bath mats in the bathroom. If possible, install handrails as well.  · Wear gloves while you do yard work.  · Wear your seat belt.  · Prevent falls by removing loose rugs and extension cords from areas where you walk. Use a cane or walker if you need it.  · Avoid constipation by:  ? Drinking enough fluid to keep your urine clear or pale yellow.  ? Eating foods that are high in fiber, such as fresh fruits and vegetables, whole grains, and beans.  ? Limiting foods that  are high in fat and processed sugars, such as fried and sweet foods.  · Do not play contact sports or participate in other activities that have a high risk for injury.  What other precautions are important if on warfarin therapy?  If you are taking a type of anticoagulant called warfarin, make sure you:  · Work with a diet and nutrition specialist (dietitian) to make an eating plan. Do not make any sudden changes to your diet after you have started your eating plan.  · Do not drink alcohol. It can interfere with your medicine and increase your risk of an injury that causes bleeding.  · Get regular blood tests as told by your health care provider.  What are some questions to ask my health care provider?  · Why do I need anticoagulant therapy?  · What is the best anticoagulant therapy for my condition?  · How long will I need anticoagulant therapy?  · What are the side effects of anticoagulant therapy?  · When should I take my medicine? What should I do if I forget to take it?  · Will I need to have regular blood tests?  · Do I need to change my diet? Are there foods or drinks that I should avoid?  · What activities are safe for me?  · What should I do if I want to get pregnant?  Contact a health care provider if:  · You miss a dose of medicine:  ? And you are not sure what to do.  ? For more than one day.  · You have:  ? Menstrual bleeding that is heavier than normal.  ? Bloody or brown urine.  ? Easy bruising.  ? Black and tarry stool or bright red stool.  ? Side effects from your medicine.  · You feel weak or dizzy.  · You become pregnant.  Get help right away if:  · You have bleeding that will not stop within 20 minutes from:  ? The nose.  ? The gums.  ? A cut on the skin.  · You have a severe headache or stomachache.  · You vomit or cough up blood.  · You fall or hit your head.  Summary  · Anticoagulant therapy, also called blood thinner therapy, is medicine that helps to prevent and treat blood  clots.  · Anticoagulants work in different ways to prevent blood clots. They also have different risks and side effects.  · Talk with your health care provider about any precautions that you should take while on anticoagulant therapy.  This information is not intended to replace advice given to you by your health care provider. Make sure you discuss any questions you have with your health care provider.  Document Released: 11/28/2016 Document Revised: 04/08/2020 Document Reviewed: 03/06/2018  Elsevier Patient Education © 2020 [employer]      Atrial Fibrillation    Atrial fibrillation is a type of heartbeat that is irregular or fast (rapid). If you have this condition, your heart beats without any order. This makes it hard for your heart to pump blood in a normal way. Having this condition gives you more risk for stroke, heart failure, and other heart problems.  Atrial fibrillation may start all of a sudden and then stop on its own, or it may become a long-lasting problem.  What are the causes?  This condition may be caused by heart conditions, such as:  · High blood pressure.  · Heart failure.  · Heart valve disease.  · Heart surgery.  Other causes include:  · Pneumonia.  · Obstructive sleep apnea.  · Lung cancer.  · Thyroid disease.  · Drinking too much alcohol.  Sometimes the cause is not known.  What increases the risk?  You are more likely to develop this condition if:  · You smoke.  · You are older.  · You have diabetes.  · You are overweight.  · You have a family history of this condition.  · You exercise often and hard.  What are the signs or symptoms?  Common symptoms of this condition include:  · A feeling like your heart is beating very fast.  · Chest pain.  · Feeling short of breath.  · Feeling light-headed or weak.  · Getting tired easily.  Follow these instructions at home:  Medicines  · Take over-the-counter and prescription medicines only as told by your doctor.  · If your doctor gives you a  "blood-thinning medicine, take it exactly as told. Taking too much of it can cause bleeding. Taking too little of it does not protect you against clots. Clots can cause a stroke.  Lifestyle         · Do not use any tobacco products. These include cigarettes, chewing tobacco, and e-cigarettes. If you need help quitting, ask your doctor.  · Do not drink alcohol.  · Do not drink beverages that have caffeine. These include coffee, soda, and tea.  · Follow diet instructions as told by your doctor.  · Exercise regularly as told by your doctor.  General instructions  · If you have a condition that causes breathing to stop for a short period of time (apnea), treat it as told by your doctor.  · Keep a healthy weight. Do not use diet pills unless your doctor says they are safe for you. Diet pills may make heart problems worse.  · Keep all follow-up visits as told by your doctor. This is important.  Contact a doctor if:  · You notice a change in the speed, rhythm, or strength of your heartbeat.  · You are taking a blood-thinning medicine and you see more bruising.  · You get tired more easily when you move or exercise.  · You have a sudden change in weight.  Get help right away if:    · You have pain in your chest or your belly (abdomen).  · You have trouble breathing.  · You have blood in your vomit, poop, or pee (urine).  · You have any signs of a stroke. \"BE FAST\" is an easy way to remember the main warning signs:  ? B - Balance. Signs are dizziness, sudden trouble walking, or loss of balance.  ? E - Eyes. Signs are trouble seeing or a change in how you see.  ? F - Face. Signs are sudden weakness or loss of feeling in the face, or the face or eyelid drooping on one side.  ? A - Arms. Signs are weakness or loss of feeling in an arm. This happens suddenly and usually on one side of the body.  ? S - Speech. Signs are sudden trouble speaking, slurred speech, or trouble understanding what people say.  ? T - Time. Time to call " emergency services. Write down what time symptoms started.  · You have other signs of a stroke, such as:  ? A sudden, very bad headache with no known cause.  ? Feeling sick to your stomach (nausea).  ? Throwing up (vomiting).  ? Jerky movements you cannot control (seizure).  These symptoms may be an emergency. Do not wait to see if the symptoms will go away. Get medical help right away. Call your local emergency services (911 in the [place] Do not drive yourself to the hospital.  Summary  · Atrial fibrillation is a type of heartbeat that is irregular or fast (rapid).  · You are at higher risk of this condition if you smoke, are older, have diabetes, or are overweight.  · Follow your doctor's instructions about medicines, diet, exercise, and follow-up visits.  · Get help right away if you think that you have signs of a stroke.  This information is not intended to replace advice given to you by your health care provider. Make sure you discuss any questions you have with your health care provider.  Document Released: 09/26/2009 Document Revised: 02/21/2019 Document Reviewed: 02/08/2019  [employer] Patient Education © 2020 [employer]    a  Smoking Cessation, Tips for Success  If you are ready to quit smoking, congratulations! You have chosen to help yourself be healthier. Cigarettes bring nicotine, tar, carbon monoxide, and other irritants into your body. Your lungs, heart, and blood vessels will be able to work better without these poisons. There are many different ways to quit smoking. Nicotine gum, nicotine patches, a nicotine inhaler, or nicotine nasal spray can help with physical craving. Hypnosis, support groups, and medicines help break the habit of smoking.  WHAT THINGS CAN I DO TO MAKE QUITTING EASIER?   Here are some tips to help you quit for good:  · Pick a date when you will quit smoking completely. Tell all of your friends and family about your plan to quit on that date.  · Do not try to slowly cut down on  "the number of cigarettes you are smoking. Pick a quit date and quit smoking completely starting on that day.  · Throw away all cigarettes.    · Clean and remove all ashtrays from your home, work, and car.  · On a card, write down your reasons for quitting. Carry the card with you and read it when you get the urge to smoke.  · Cleanse your body of nicotine. Drink enough water and fluids to keep your urine clear or pale yellow. Do this after quitting to flush the nicotine from your body.  · Learn to predict your moods. Do not let a bad situation be your excuse to have a cigarette. Some situations in your life might tempt you into wanting a cigarette.  · Never have \"just one\" cigarette. It leads to wanting another and another. Remind yourself of your decision to quit.  · Change habits associated with smoking. If you smoked while driving or when feeling stressed, try other activities to replace smoking. Stand up when drinking your coffee. Brush your teeth after eating. Sit in a different chair when you read the paper. Avoid alcohol while trying to quit, and try to drink fewer caffeinated beverages. Alcohol and caffeine may urge you to smoke.  · Avoid foods and drinks that can trigger a desire to smoke, such as sugary or spicy foods and alcohol.  · Ask people who smoke not to smoke around you.  · Have something planned to do right after eating or having a cup of coffee. For example, plan to take a walk or exercise.  · Try a relaxation exercise to calm you down and decrease your stress. Remember, you may be tense and nervous for the first 2 weeks after you quit, but this will pass.  · Find new activities to keep your hands busy. Play with a pen, coin, or rubber band. Doodle or draw things on paper.  · Brush your teeth right after eating. This will help cut down on the craving for the taste of tobacco after meals. You can also try mouthwash.    · Use oral substitutes in place of cigarettes. Try using lemon drops, carrots, " "cinnamon sticks, or chewing gum. Keep them handy so they are available when you have the urge to smoke.  · When you have the urge to smoke, try deep breathing.  · Designate your home as a nonsmoking area.  · If you are a heavy smoker, ask your health care provider about a prescription for nicotine chewing gum. It can ease your withdrawal from nicotine.  · Reward yourself. Set aside the cigarette money you save and buy yourself something nice.  · Look for support from others. Join a support group or smoking cessation program. Ask someone at home or at work to help you with your plan to quit smoking.  · Always ask yourself, \"Do I need this cigarette or is this just a reflex?\" Tell yourself, \"Today, I choose not to smoke,\" or \"I do not want to smoke.\" You are reminding yourself of your decision to quit.  · Do not replace cigarette smoking with electronic cigarettes (commonly called e-cigarettes). The safety of e-cigarettes is unknown, and some may contain harmful chemicals.  · If you relapse, do not give up! Plan ahead and think about what you will do the next time you get the urge to smoke.  HOW WILL I FEEL WHEN I QUIT SMOKING?  You may have symptoms of withdrawal because your body is used to nicotine (the addictive substance in cigarettes). You may crave cigarettes, be irritable, feel very hungry, cough often, get headaches, or have difficulty concentrating. The withdrawal symptoms are only temporary. They are strongest when you first quit but will go away within 10-14 days. When withdrawal symptoms occur, stay in control. Think about your reasons for quitting. Remind yourself that these are signs that your body is healing and getting used to being without cigarettes. Remember that withdrawal symptoms are easier to treat than the major diseases that smoking can cause.   Even after the withdrawal is over, expect periodic urges to smoke. However, these cravings are generally short lived and will go away whether you " smoke or not. Do not smoke!  WHAT RESOURCES ARE AVAILABLE TO HELP ME QUIT SMOKING?  Your health care provider can direct you to community resources or hospitals for support, which may include:  · Group support.  · Education.  · Hypnosis.  · Therapy.     This information is not intended to replace advice given to you by your health care provider. Make sure you discuss any questions you have with your health care provider.     Document Released: 09/15/2005 Document Revised: 01/08/2016 Document Reviewed: 06/05/2014  [employer] Interactive Patient Education ©2016 Elsevier Inc.      Smoking Tobacco Information, Adult  Smoking tobacco can be harmful to your health. Tobacco contains a poisonous (toxic), colorless chemical called nicotine. Nicotine is addictive. It changes the brain and can make it hard to stop smoking. Tobacco also has other toxic chemicals that can hurt your body and raise your risk of many cancers.  How can smoking tobacco affect me?  Smoking tobacco puts you at risk for:  · Cancer. Smoking is most commonly associated with lung cancer, but can also lead to cancer in other parts of the body.  · Chronic obstructive pulmonary disease (COPD). This is a long-term lung condition that makes it hard to breathe. It also gets worse over time.  · High blood pressure (hypertension), heart disease, stroke, or heart attack.  · Lung infections, such as pneumonia.  · Cataracts. This is when the lenses in the eyes become clouded.  · Digestive problems. This may include peptic ulcers, heartburn, and gastroesophageal reflux disease (GERD).  · Oral health problems, such as gum disease and tooth loss.  · Loss of taste and smell.  Smoking can affect your appearance by causing:  · Wrinkles.  · Yellow or stained teeth, fingers, and fingernails.  Smoking tobacco can also affect your social life, because:  · It may be challenging to find places to smoke when away from home. Many workplaces, restaurants, hotels, and public places  are tobacco-free.  · Smoking is expensive. This is due to the cost of tobacco and the long-term costs of treating health problems from smoking.  · Secondhand smoke may affect those around you. Secondhand smoke can cause lung cancer, breathing problems, and heart disease. Children of smokers have a higher risk for:  ? Sudden infant death syndrome (SIDS).  ? Ear infections.  ? Lung infections.  If you currently smoke tobacco, quitting now can help you:  · Lead a longer and healthier life.  · Look, smell, breathe, and feel better over time.  · Save money.  · Protect others from the harms of secondhand smoke.  What actions can I take to prevent health problems?  Quit smoking    · Do not start smoking. Quit if you already do.  · Make a plan to quit smoking and commit to it. Look for programs to help you and ask your health care provider for recommendations and ideas.  · Set a date and write down all the reasons you want to quit.  · Let your friends and family know you are quitting so they can help and support you. Consider finding friends who also want to quit. It can be easier to quit with someone else, so that you can support each other.  · Talk with your health care provider about using nicotine replacement medicines to help you quit, such as gum, lozenges, patches, sprays, or pills.  · Do not replace cigarette smoking with electronic cigarettes, which are commonly called e-cigarettes. The safety of e-cigarettes is not known, and some may contain harmful chemicals.  · If you try to quit but return to smoking, stay positive. It is common to slip up when you first quit, so take it one day at a time.  · Be prepared for cravings. When you feel the urge to smoke, chew gum or suck on hard candy.  Lifestyle  · Stay busy and take care of your body.  · Drink enough fluid to keep your urine pale yellow.  · Get plenty of exercise and eat a healthy diet. This can help prevent weight gain after quitting.  · Monitor your eating  habits. Quitting smoking can cause you to have a larger appetite than when you smoke.  · Find ways to relax. Go out with friends or family to a movie or a restaurant where people do not smoke.  · Ask your health care provider about having regular tests (screenings) to check for cancer. This may include blood tests, imaging tests, and other tests.  · Find ways to manage your stress, such as meditation, yoga, or exercise.  Where to find support  To get support to quit smoking, consider:  · Asking your health care provider for more information and resources.  · Taking classes to learn more about quitting smoking.  · Looking for local organizations that offer resources about quitting smoking.  · Joining a support group for people who want to quit smoking in your local community.  · Calling the [employer] counselor helpline: 8-788-Quit-Now (1-888.766.8657)  Where to find more information  You may find more information about quitting smoking from:  · HelpGuide.org: www.helpguide.org  · Smokefree.gov: smokefree.gov  · American Lung Association: www.lung.org  Contact a health care provider if you:  · Have problems breathing.  · Notice that your lips, nose, or fingers turn blue.  · Have chest pain.  · Are coughing up blood.  · Feel faint or you pass out.  · Have other health changes that cause you to worry.  Summary  · Smoking tobacco can negatively affect your health, the health of those around you, your finances, and your social life.  · Do not start smoking. Quit if you already do. If you need help quitting, ask your health care provider.  · Think about joining a support group for people who want to quit smoking in your local community. There are many effective programs that will help you to quit this behavior.  This information is not intended to replace advice given to you by your health care provider. Make sure you discuss any questions you have with your health care provider.  Document Released: 01/02/2018 Document  Revised: 02/06/2019 Document Reviewed: 01/02/2018  Elsevier Patient Education © 2020 Elsevier Inc.

## 2020-10-17 NOTE — DISCHARGE SUMMARY
Discharge Summary    Date of Admission: 10/14/2020  Date of Discharge: 10/16/2020  5:56 PM  Discharging Attending: Dr. Maxwell  Discharging Senior Resident: Dr. Chacko  Discharging Intern: Dr. Fonseca    CHIEF COMPLAINT ON ADMISSION  Chief Complaint   Patient presents with   • Lightheadedness     x 1 week   • Shortness of Breath     hx of COPD       Reason for Admission  Lightheadedness    Admission Date  10/14/2020    CODE STATUS  DNAR/DNI    HPI & HOSPITAL COURSE  This is a 87 y.o. female here with COPD, hypothyroidism, hypertension, thoracic aneurysm, CKD 3 who presented with postural dizziness. It occurs when she turns her head to the right while lying in bed. Also occurs when she stands up from sitting.    #Chronic cerebellar infarct  #Vertigo, central  Hemodynamically stable. Negative orthostatics. Ara-Hallpike was normal. EKG showed atrial fibrillation. CT head unremarkable. MR without showed chronic cerebellar infarct. MR neck showed left and right carotid bulb 50% stenosis. TTE showed EF 55% and otherwise unremarkable. She likely had infarct secondary to atrial fibrillation. She was continued on aspirin and atorvastatin. Her gabapentin was discontinued given her dizziness.        #Atrial fibrillation, new onset  New onset this hospitalization. CHADSVASC of 4. She was started on apixiban.      Therefore, she is discharged in good and stable condition to home with close outpatient follow-up.    The patient met 2-midnight criteria for an inpatient stay at the time of discharge.    Discharge Date  10/16/2020    FOLLOW UP ITEMS POST DISCHARGE  Follow up with PCP for post hospital follow up    DISCHARGE DIAGNOSES  Principal Problem:    Lightheadedness POA: Unknown  Active Problems:    Weakness POA: Unknown    Hypothyroidism POA: Yes    Current smoker POA: Yes    Aortic aneurysm without rupture (HCC) POA: Yes      Overview: 5.3 cm 6/2019, following with Dr. Saunders     Compression fracture of fourth lumbar vertebra  (HCC) POA: Yes      Overview: Started 03/2018 on fosamax.     COPD (chronic obstructive pulmonary disease) (HCC) POA: Yes    Age-related osteoporosis without current pathological fracture POA: Yes      Overview: Started on fosamax 03/2018.     Stage 3 chronic kidney disease POA: Yes    Atrial fibrillation and flutter (HCC) POA: Unknown    Elevated troponin POA: Unknown    Hypertension POA: Unknown    Thrombocytopenia (HCC) POA: Yes    Elevated brain natriuretic peptide (BNP) level POA: Unknown  Resolved Problems:    * No resolved hospital problems. *      FOLLOW UP  Future Appointments   Date Time Provider Department Center   11/6/2020 10:20 AM Ru Akins M.D. [facility] None   3/30/2021  8:30 AM Watson Chaudhari M.D. [facility]     Watson Chaudhari M.D.  [address]  [place] [address]  [phone]    Go in 5 months  follow up      MEDICATIONS ON DISCHARGE     Medication List      START taking these medications      Instructions   apixaban 5mg Tabs  Commonly known as: ELIQUIS   Take 0.5 Tabs by mouth 2 Times a Day.  Dose: 2.5 mg        CONTINUE taking these medications      Instructions   albuterol 108 (90 Base) MCG/ACT Aers inhalation aerosol   Doctor's comments: Please choose whichever albuterol is covered by insurance company: either Ventolin, ProAir or Proventil  Inhale 2 Puffs by mouth every 6 hours as needed for Shortness of Breath.  Dose: 2 Puff     alendronate 70 MG Tabs  Commonly known as: FOSAMAX   Take 1 Tab by mouth every 7 days.  Dose: 70 mg     aspirin EC 81 MG Tbec  Commonly known as: ECOTRIN   Take 81 mg by mouth every day.  Dose: 81 mg     atorvastatin 40 MG Tabs  Commonly known as: LIPITOR   Doctor's comments: Insurance will pay for 100 day supply, please cancel previous orders that have 30 or 90 day supply  Take 1 Tab by mouth every day.  Dose: 40 mg     Fluticasone-Umeclidin-Vilant 100-62.5-25 MCG/INH Aepb   Inhale 1 Puff by mouth every day.  Dose: 1 Puff      levothyroxine 112 MCG Tabs  Commonly known as: SYNTHROID   Take 1 Tab by mouth Every morning on an empty stomach.  Dose: 112 mcg     pantoprazole 40 MG Tbec  Commonly known as: PROTONIX   Take 1 Tab by mouth every day.  Dose: 40 mg     VITAMIN D BOOSTER PO   Take 1 Tab by mouth every day.  Dose: 1 Tab        STOP taking these medications    fluticasone 50 MCG/ACT nasal spray  Commonly known as: FLONASE     gabapentin 100 MG Caps  Commonly known as: NEURONTIN            Allergies  No Known Allergies    DIET  Orders Placed This Encounter   Procedures   • Diet Order Regular     Standing Status:   Standing     Number of Occurrences:   1     Order Specific Question:   Diet:     Answer:   Regular [1]       ACTIVITY  As tolerated.  Weight bearing as tolerated

## 2020-10-17 NOTE — PROGRESS NOTES
Patient discharged to home with family via car. Patient escorted out via wheelchair. Discharge paperwork reviewed with patient and family. No further questions.

## 2020-10-17 NOTE — DISCHARGE PLANNING
Called [facility] pharmacy to verify insurance coverage for eliquis.   Per pharmacy rx went through insurance for $114 co pay.     Met with pt and daughter at bedside to discuss. Pt agreeable to cost.

## 2020-10-19 DIAGNOSIS — E78.2 MIXED HYPERLIPIDEMIA: ICD-10-CM

## 2020-10-19 RX ORDER — ATORVASTATIN CALCIUM 40 MG/1
40 TABLET, FILM COATED ORAL
Qty: 100 TAB | Refills: 0 | Status: SHIPPED | OUTPATIENT
Start: 2020-10-19 | End: 2020-11-06

## 2020-10-26 NOTE — PROGRESS NOTES
Verified 3 forms of HIPAA with member.   Called member regarding ED visit. member stated that she was feeling ok and did not need anything. Member has follow appointments already scheduled for 11/2/2020 with PCP and 11/6/2020 with Cardiologist. Member had no further Questions.  Systems used: [employer] [facility].

## 2020-10-27 ENCOUNTER — TELEPHONE (OUTPATIENT)
Dept: MEDICAL GROUP | Facility: PHYSICIAN GROUP | Age: 85
End: 2020-10-27

## 2020-10-27 NOTE — TELEPHONE ENCOUNTER
ESTABLISHED PATIENT PRE-VISIT PLANNING     Patient was contacted to complete PVP.  1.  Reviewed notes from the last few office visits within the medical group: Yes    2.  If any orders were placed at last visit or intended to be done for this visit (i.e. 6 mos follow-up), do we have Results/Consult Notes?        •  Labs - Labs ordered, NOT completed. Patient advised to complete prior to next appointment.         •  Imaging - Imaging was not ordered at last office visit.       •  Referrals - Referral ordered, patient has NOT been seen. Pt scheduled with vascular med on 11/6/20    3. Is this appointment scheduled as a Hospital Follow-Up? Yes, visit was at [facility].     4.  Immunizations were updated in Epic using WebIZ?: unable to check webiz       •  Web Iz Recommendations:     5.  Patient is due for the following Health Maintenance Topics:   Health Maintenance Due   Topic Date Due   • IMM ZOSTER VACCINES (1 of 2) [birth year]1982   • Annual Wellness Visit  05/12/2019   • Annual Pulmonary Function Test / Spirometry  05/16/2020           6. Orders for overdue Health Maintenance topics pended in Pre-Charting? NO    7.  AHA (MDX) form printed for Provider? YES    8.  Patient was informed to arrive 15 min prior to their scheduled appointment and bring in their medication bottles.

## 2020-10-29 DIAGNOSIS — M81.0 AGE-RELATED OSTEOPOROSIS WITHOUT CURRENT PATHOLOGICAL FRACTURE: ICD-10-CM

## 2020-11-02 ENCOUNTER — OFFICE VISIT (OUTPATIENT)
Dept: MEDICAL GROUP | Facility: PHYSICIAN GROUP | Age: 85
End: 2020-11-02
Payer: MEDICARE

## 2020-11-02 VITALS
SYSTOLIC BLOOD PRESSURE: 98 MMHG | DIASTOLIC BLOOD PRESSURE: 56 MMHG | OXYGEN SATURATION: 94 % | HEART RATE: 90 BPM | BODY MASS INDEX: 16.56 KG/M2 | TEMPERATURE: 98.2 F | WEIGHT: 90 LBS | HEIGHT: 62 IN

## 2020-11-02 DIAGNOSIS — K59.01 SLOW TRANSIT CONSTIPATION: ICD-10-CM

## 2020-11-02 DIAGNOSIS — E03.9 HYPOTHYROIDISM, UNSPECIFIED TYPE: ICD-10-CM

## 2020-11-02 DIAGNOSIS — F17.200 CURRENT SMOKER: ICD-10-CM

## 2020-11-02 DIAGNOSIS — I48.91 ATRIAL FIBRILLATION AND FLUTTER (HCC): ICD-10-CM

## 2020-11-02 DIAGNOSIS — I48.92 ATRIAL FIBRILLATION AND FLUTTER (HCC): ICD-10-CM

## 2020-11-02 DIAGNOSIS — I10 ESSENTIAL HYPERTENSION: ICD-10-CM

## 2020-11-02 DIAGNOSIS — I71.9 AORTIC ANEURYSM WITHOUT RUPTURE, UNSPECIFIED PORTION OF AORTA (HCC): ICD-10-CM

## 2020-11-02 DIAGNOSIS — Z86.73 HISTORY OF CVA (CEREBROVASCULAR ACCIDENT): ICD-10-CM

## 2020-11-02 PROCEDURE — 99214 OFFICE O/P EST MOD 30 MIN: CPT | Performed by: INTERNAL MEDICINE

## 2020-11-02 RX ORDER — SENNA AND DOCUSATE SODIUM 50; 8.6 MG/1; MG/1
1 TABLET, FILM COATED ORAL DAILY
Qty: 30 TAB | Refills: 11 | Status: CANCELLED | OUTPATIENT
Start: 2020-11-02

## 2020-11-02 RX ORDER — PANTOPRAZOLE SODIUM 40 MG/1
40 TABLET, DELAYED RELEASE ORAL
Qty: 90 TAB | Refills: 1 | Status: SHIPPED | OUTPATIENT
Start: 2020-11-02 | End: 2021-05-11

## 2020-11-02 RX ORDER — SENNA AND DOCUSATE SODIUM 50; 8.6 MG/1; MG/1
2 TABLET, FILM COATED ORAL
Qty: 60 TAB | Refills: 2 | Status: SHIPPED | OUTPATIENT
Start: 2020-11-02 | End: 2021-05-18

## 2020-11-02 RX ORDER — NICOTINE 21 MG/24HR
1 PATCH, TRANSDERMAL 24 HOURS TRANSDERMAL EVERY 24 HOURS
Qty: 42 PATCH | Refills: 0 | Status: SHIPPED | OUTPATIENT
Start: 2020-11-02 | End: 2020-12-14

## 2020-11-02 RX ORDER — NICOTINE 21 MG/24HR
1 PATCH, TRANSDERMAL 24 HOURS TRANSDERMAL EVERY 24 HOURS
Qty: 14 PATCH | Refills: 0 | Status: SHIPPED | OUTPATIENT
Start: 2020-11-02 | End: 2020-11-16

## 2020-11-02 RX ORDER — ALENDRONATE SODIUM 70 MG/1
70 TABLET ORAL
Qty: 12 TAB | Refills: 1 | Status: SHIPPED | OUTPATIENT
Start: 2020-11-02 | End: 2021-11-22

## 2020-11-02 RX ORDER — LEVOTHYROXINE SODIUM 0.12 MG/1
125 TABLET ORAL
Qty: 60 TAB | Refills: 2 | Status: SHIPPED | OUTPATIENT
Start: 2020-11-02 | End: 2021-05-18

## 2020-11-02 ASSESSMENT — FIBROSIS 4 INDEX: FIB4 SCORE: 3.13

## 2020-11-03 NOTE — PROGRESS NOTES
Established Patient    Chief Complaint   Patient presents with   • Hospital Follow-up       Subjective:     HPI:   Mary presents today with the following.    3. Atrial fibrillation and flutter (HCC)  7. History of CVA (cerebrovascular accident)  Recently admitted to the hospital for lightheadedness, MRI showed as below, she was taking aspirin as well, she was also diagnosed with atrial fibrillation during hospitalization and started on Eliquis    MRI brain October 20201.    No acute abnormality.  2.  Chronic lacunar infarcts in the left thalamus and right cerebellar hemisphere.  3.  Severe chronic microvascular ischemic disease    MRI brain March 2019:  IMPRESSION:     1.  No acute abnormality.  2.  Moderate chronic microvascular ischemic disease.  3.  Brainstem gliosis.  4.  Chronic lacunar infarct in the left thalamus.  5.  Moderate cerebral atrophy.  >> Patient was in hospital in March 2019 for dizziness and started on aspirin secondary to chronic CVA of the left thalamus,   >> She mention compliant with aspirin 81 mg a day as well as atorvastatin 40 mg daily      1. Hypothyroidism, unspecified type  6. Slow transit constipation  Chronic hypothyroidism, TSH is high and uncontrolled, she is taking Synthroid 112 mcg daily  -She is also having chronic constipation, never had a colonoscopy per patient as well as record, mention in hospital recently tried Synthroid with good outcome with regular bowel movement, however even with that medication she started having constipation episodes with one bowel movement once a week sometimes, mention she is using self treated enema at home, also tried MiraLAX before with some success however usually constipation relapse-patient denied having apparent GI bleeding, melena, abdominal pain, nausea, vomiting    2. Essential hypertension  -She was on lisinopril before, however blood pressure is within normal range and even little bit low today, mention she has been taking lisinopril  for a while      4. Current smoker  -Patient would like nicotine therapy for smoking cessation    Patient Active Problem List    Diagnosis Date Noted   • Weakness 10/15/2020     Priority: High   • Hypothyroidism 02/08/2016     Priority: Medium   • Elevated brain natriuretic peptide (BNP) level 10/15/2020     Priority: Low   • Thrombocytopenia ([facility]) 11/21/2019     Priority: Low   • Hypertension 06/21/2019     Priority: Low   • Lightheadedness 10/15/2020   • Atrial fibrillation and flutter ([facility]) 10/15/2020   • Elevated troponin 10/15/2020   • Post-nasal drip 11/21/2019   • Age-related osteoporosis without current pathological fracture 11/21/2019   • Stage 3 chronic kidney disease 11/21/2019   • Slow transit constipation 11/21/2019   • COPD (chronic obstructive pulmonary disease) ([facility]) 05/28/2019   • Atherosclerosis of aorta ([facility]) 03/27/2019   • History of CVA (cerebrovascular accident) 03/04/2019   • Ataxia 03/03/2019   • Gastroesophageal reflux disease without esophagitis 05/11/2018   • Hearing difficulty 05/11/2018   • Aortic aneurysm without rupture ([facility]) 03/28/2018   • Compression fracture of fourth lumbar vertebra ([facility]) 03/28/2018   • Protein-calorie malnutrition ([facility]) 03/28/2018   • Current smoker 12/07/2016   • Mixed hyperlipidemia 12/07/2016       Current Outpatient Medications on File Prior to Visit   Medication Sig Dispense Refill   • pantoprazole (PROTONIX) 40 MG Tablet Delayed Response Take 1 Tab by mouth every day. 90 Tab 1   • alendronate (FOSAMAX) 70 MG Tab Take 1 Tab by mouth every 7 days. 12 Tab 1   • atorvastatin (LIPITOR) 40 MG Tab Take 1 Tab by mouth every day. 100 Tab 0   • apixaban (ELIQUIS) 5mg Tab Take 0.5 Tabs by mouth 2 Times a Day. 60 Tab 0   • albuterol 108 (90 Base) MCG/ACT Aero Soln inhalation aerosol Inhale 2 Puffs by mouth every 6 hours as needed for Shortness of Breath. 1 Inhaler 5   • Fluticasone-Umeclidin-Vilant 100-62.5-25 MCG/INH AEROSOL POWDER, BREATH ACTIVATED Inhale 1 Puff by  "mouth every day. 3 Each 3   • Nutritional Supplements (VITAMIN D BOOSTER PO) Take 1 Tab by mouth every day.     • aspirin EC (ECOTRIN) 81 MG Tablet Delayed Response Take 81 mg by mouth every day.       No current facility-administered medications on file prior to visit.        Allergies, past medical history, past surgical history, family history, social history reviewed and updated    ROS:  All other systems reviewed and are negative except as stated in the HPI     Physical Exam:     BP (!) 98/56 (BP Location: Right arm, Patient Position: Sitting, BP Cuff Size: Adult)   Pulse 90   Temp 36.8 °C (98.2 °F) (Temporal)   Ht 1.575 m (5' 2\")   Wt 40.8 kg (90 lb)   LMP 03/03/1970 (Approximate)   SpO2 94%   BMI 16.46 kg/m²   General: Normal appearing. No distress.  ENT: oropharynx without exudates.    Eyes: conjunctiva clear lids without ptosis  Pulmonary: Clear to ausculation.  Normal effort.   Cardiovascular: Regular rate and rhythm  Abdomen: Soft, nontender,  Lymph: No cervical or supraclavicular palpable lymph nodes  Psych: Normal mood and affect.     I have reviewed pertinent labs and diagnostic tests associated with this visit with specific comments listed under the assessment and plan below      Assessment and Plan:     87 y.o. female with the following issues.    3. Atrial fibrillation and flutter (HCC)  7. History of CVA (cerebrovascular accident)  >> Advised to continue Eliquis, stop aspirin secondary to bleeding risk, advised to follow-up with vascular medicine in November 6, 2020 as new appointment for further discussion about that      1. Hypothyroidism, unspecified type  6. Slow transit constipation  - levothyroxine (SYNTHROID) 125 MCG Tab; Take 1 Tab by mouth Every morning on an empty stomach.  Dispense: 60 Tab; Refill: 2>> increase from 112 mcg daily secondary to high TSH as well as chronic constipation  - T3 FREE; Future  - TSH+FREE T4    2. Essential hypertension  -Advised to check blood pressure at " home and inform the clinic regarding any episodes of hypertension or hypertension    4. Current smoker  - nicotine (NICODERM) 14 MG/24HR PATCH 24 HR; Apply 1 Patch to skin as directed every 24 hours for 14 days.  Dispense: 14 Patch; Refill: 0  - nicotine (NICODERM) 21 MG/24HR PATCH 24 HR; Apply 1 Patch to skin as directed every 24 hours for 42 days.  Dispense: 42 Patch; Refill: 0  - nicotine (NICODERM) 7 MG/24HR PATCH 24 HR; Apply 1 Patch to skin as directed every 24 hours for 14 days.  Dispense: 14 Patch; Refill: 0  Smoking cessation counseling:   A) Patient and I discussed various methods for smoking cessation including pharmacological supports (Chantix vs. Zyban, Nicotine replacement therapy), smoking cessation groups, smoking hotlines. Discussion of triggers to smoke and reasons to quit.   B) We discussed that number of lifetime quit attempts vary, but that probability of success increases when combining methods (pharmacologic + behavioral modification).          5. Aortic aneurysm without rupture, unspecified portion of aorta (HCC)  To establish with vascular medicine November 6, 2020    6. Slow transit constipation  >> Details regarding conservative management including well hydration, exercise as tolerated, plenty of fibers and vegetables,  -Also increase dosage of Synthroid, this might help also with improvement of constipation  - REFERRAL TO GASTROENTEROLOGY  - magnesium hydroxide (MILK OF MAGNESIA) 400 MG/5ML Suspension; Take 30 mL by mouth 1 time daily as needed.  Dispense: 480 mL; Refill: 2  - sennosides-docusate sodium (SENOKOT-S) 8.6-50 MG tablet; Take 2 Tabs by mouth 1 time daily as needed.  Dispense: 60 Tab; Refill: 2    >> Educated to try senna docusate first, if still an issue with no response, could try MiraLAX which she has at home, if still has an issue might try milk of magnesia    Follow Up:      Return in about 2 months (around 1/2/2021) for follow up, constipation, hypothyroidism.    Please  note that this dictation was created using voice recognition software. I have made every reasonable attempt to correct obvious errors, but I expect that there are errors of grammar and possibly content that I did not discover before finalizing the note.    Signed by: Watson Chaudhari M.D.

## 2020-11-06 ENCOUNTER — OFFICE VISIT (OUTPATIENT)
Dept: VASCULAR LAB | Facility: MEDICAL CENTER | Age: 85
End: 2020-11-06
Attending: FAMILY MEDICINE
Payer: MEDICARE

## 2020-11-06 VITALS
HEART RATE: 82 BPM | SYSTOLIC BLOOD PRESSURE: 156 MMHG | BODY MASS INDEX: 16.93 KG/M2 | HEIGHT: 62 IN | DIASTOLIC BLOOD PRESSURE: 90 MMHG | WEIGHT: 92 LBS

## 2020-11-06 DIAGNOSIS — Z86.73 HISTORY OF CVA (CEREBROVASCULAR ACCIDENT): ICD-10-CM

## 2020-11-06 DIAGNOSIS — I71.9 DESCENDING AORTIC ANEURYSM (HCC): ICD-10-CM

## 2020-11-06 DIAGNOSIS — I48.91 ATRIAL FIBRILLATION AND FLUTTER (HCC): ICD-10-CM

## 2020-11-06 DIAGNOSIS — I65.23 CAROTID ARTERY STENOSIS, ASYMPTOMATIC, BILATERAL: ICD-10-CM

## 2020-11-06 DIAGNOSIS — E78.5 DYSLIPIDEMIA: ICD-10-CM

## 2020-11-06 DIAGNOSIS — J44.9 CHRONIC OBSTRUCTIVE PULMONARY DISEASE, UNSPECIFIED COPD TYPE (HCC): ICD-10-CM

## 2020-11-06 DIAGNOSIS — I71.40 ABDOMINAL AORTIC ANEURYSM (AAA) WITHOUT RUPTURE (HCC): ICD-10-CM

## 2020-11-06 DIAGNOSIS — I71.60 THORACOABDOMINAL AORTIC ANEURYSM, WITHOUT RUPTURE (HCC): ICD-10-CM

## 2020-11-06 DIAGNOSIS — I48.92 ATRIAL FIBRILLATION AND FLUTTER (HCC): ICD-10-CM

## 2020-11-06 DIAGNOSIS — I71.21 ASCENDING AORTIC ANEURYSM (HCC): ICD-10-CM

## 2020-11-06 DIAGNOSIS — I10 ESSENTIAL HYPERTENSION: ICD-10-CM

## 2020-11-06 DIAGNOSIS — E03.9 HYPOTHYROIDISM, UNSPECIFIED TYPE: ICD-10-CM

## 2020-11-06 PROBLEM — I70.0 ATHEROSCLEROSIS OF AORTA (HCC): Status: RESOLVED | Noted: 2019-03-27 | Resolved: 2020-11-06

## 2020-11-06 PROCEDURE — 99406 BEHAV CHNG SMOKING 3-10 MIN: CPT | Performed by: FAMILY MEDICINE

## 2020-11-06 PROCEDURE — 99212 OFFICE O/P EST SF 10 MIN: CPT | Mod: 25

## 2020-11-06 PROCEDURE — 99204 OFFICE O/P NEW MOD 45 MIN: CPT | Mod: 25 | Performed by: FAMILY MEDICINE

## 2020-11-06 RX ORDER — ROSUVASTATIN CALCIUM 40 MG/1
40 TABLET, COATED ORAL DAILY
Qty: 90 TAB | Refills: 3 | Status: SHIPPED | OUTPATIENT
Start: 2020-11-06 | End: 2021-02-18 | Stop reason: SDUPTHER

## 2020-11-06 RX ORDER — METOPROLOL SUCCINATE 25 MG/1
25 TABLET, EXTENDED RELEASE ORAL DAILY
Qty: 90 TAB | Refills: 3 | Status: SHIPPED | OUTPATIENT
Start: 2020-11-06 | End: 2021-04-08

## 2020-11-06 ASSESSMENT — ENCOUNTER SYMPTOMS
HEMOPTYSIS: 0
INSOMNIA: 0
SEIZURES: 0
PALPITATIONS: 0
DIARRHEA: 0
TREMORS: 0
ABDOMINAL PAIN: 0
HEADACHES: 0
DIZZINESS: 0
BLURRED VISION: 0
VOMITING: 0
DEPRESSION: 0
SHORTNESS OF BREATH: 0
NAUSEA: 0
ORTHOPNEA: 0
FOCAL WEAKNESS: 0
WHEEZING: 0
DOUBLE VISION: 0
BLOOD IN STOOL: 0
WEAKNESS: 0
CHILLS: 0
SORE THROAT: 0
NERVOUS/ANXIOUS: 0
MYALGIAS: 0
COUGH: 0
BRUISES/BLEEDS EASILY: 0
FEVER: 0

## 2020-11-06 ASSESSMENT — FIBROSIS 4 INDEX: FIB4 SCORE: 3.13

## 2020-11-06 NOTE — PROGRESS NOTES
INITIAL VASCULAR VISIT  Subjective:   Mary Murrieta is a 87 y.o. y.o. female  who presents today 11/06/20  for   Chief Complaint   Patient presents with   • Office Visit     Aortic aneurysm without rupture, unspecified portion of aorta     initially referred by Watson Chaudhari M.D. for eval and med mgmt of AAA, TAA     HPI:    Thoracic Aortic Aneurysm  Type: ascending, descending   Current size: 3.5cm for ascending, 3.4cm descending per CTA 6/2019  Current/interval concerns: questions regarding treatment options.  Previously with Dr. Bautista, did not complete 12/2019 imaging surveillance.    Current sx: denies chest, back, abdominal pain, SOB, dysphagia, worsening cough, hemoptysis.    AAA:   Interval hx:  Feeling well. No concerns   Denies abd pain, distal ischemic limb symptoms such as discoloration, cool extremities, claudication.   Current size:  4.7cm per CTA on 6/2019    HTN:  Current HTN concerns: Denies   Current ADRs: No  HTN sx:  No current blurred or changed vision, chest pain, shortness of breath, headache, nausea, dizziness/vertigo   Home BP log: not checking   24h ABPM completed: not tested  Adherence to current HTN meds: compliant all of the time    Hyperlipidemia:    Stable, no current concerns  Current treatment: Moderate intensity statin  atorva   Myalgias? No  Other adverse drug reactions? No  Last lipid profile: reviewed with patient, as noted below     Hx of CVA:   Prior lacunar infarcts per MRI  No current worsening focal neuro s/s.     Antiplatelet/anticoagulation:   Yes, Details: Eliquis 5mg BID, no bleeding noted   Hx of PAF.      Type 2 DM:  No     CKD:    No     Sleeping disorder/VANDANA:   No     Hypothyroidism:  Stable, no issues. Tolerating meds.    Clinical evidence of ASCVD:    1) hx of MI or other ACS:  no  2) coronary or other revasc procedure: no  3) TIA/ischemic CVA: yes  4) AS-related PAD (including RIDGE <0.9): no  5) other AS diseases (renal AS, AA due to AS, carotid plaque  >50% stenosis): AAA, TAA  6) evidence of AS from imaging (angiography, stress tests, CAC >300 or >74%ile for age/gender): no    Past Medical History:   Diagnosis Date   • Aortic aneurysm without rupture (HCC)    • Atherosclerosis of aorta (HCC) 3/27/2019   • Cataract     +surgery bilat   • Compression fracture of fourth lumbar vertebra (HCC) 3/28/2018   • Dental disorder 03/20/2018    upper and lower   • Emphysema of lung (HCC)    • Gastroesophageal reflux disease without esophagitis 5/11/2018   • Hiatus hernia syndrome    • Hypothyroidism 2/8/2016   • Mixed hyperlipidemia 12/7/2016   • Protein-calorie malnutrition (HCC) 3/28/2018     Past Surgical History:   Procedure Laterality Date   • ABDOMINAL HYSTERECTOMY TOTAL  1986   • TONSILLECTOMY  1938   • THYROIDECTOMY      1976      Current Outpatient Medications on File Prior to Visit   Medication Sig Dispense Refill   • pantoprazole (PROTONIX) 40 MG Tablet Delayed Response Take 1 Tab by mouth every day. 90 Tab 1   • alendronate (FOSAMAX) 70 MG Tab Take 1 Tab by mouth every 7 days. 12 Tab 1   • magnesium hydroxide (MILK OF MAGNESIA) 400 MG/5ML Suspension Take 30 mL by mouth 1 time daily as needed. 480 mL 2   • levothyroxine (SYNTHROID) 125 MCG Tab Take 1 Tab by mouth Every morning on an empty stomach. 60 Tab 2   • nicotine (NICODERM) 14 MG/24HR PATCH 24 HR Apply 1 Patch to skin as directed every 24 hours for 14 days. 14 Patch 0   • nicotine (NICODERM) 21 MG/24HR PATCH 24 HR Apply 1 Patch to skin as directed every 24 hours for 42 days. 42 Patch 0   • nicotine (NICODERM) 7 MG/24HR PATCH 24 HR Apply 1 Patch to skin as directed every 24 hours for 14 days. 14 Patch 0   • sennosides-docusate sodium (SENOKOT-S) 8.6-50 MG tablet Take 2 Tabs by mouth 1 time daily as needed. 60 Tab 2   • Magnesium 400 MG Cap Take 1 Each by mouth every day. 90 Cap 1   • apixaban (ELIQUIS) 5mg Tab Take 0.5 Tabs by mouth 2 Times a Day. 60 Tab 0   • albuterol 108 (90 Base) MCG/ACT Aero Soln  inhalation aerosol Inhale 2 Puffs by mouth every 6 hours as needed for Shortness of Breath. 1 Inhaler 5   • Fluticasone-Umeclidin-Vilant 100-62.5-25 MCG/INH AEROSOL POWDER, BREATH ACTIVATED Inhale 1 Puff by mouth every day. 3 Each 3   • Nutritional Supplements (VITAMIN D BOOSTER PO) Take 1 Tab by mouth every day.     • aspirin EC (ECOTRIN) 81 MG Tablet Delayed Response Take 81 mg by mouth every day.       No current facility-administered medications on file prior to visit.      No Known Allergies    Family History   Problem Relation Age of Onset   • Cancer Mother    • Cancer Father         esophageal   • Stroke Father    • Lung Disease Sister         emphysema        Social History     Tobacco Use   • Smoking status: Current Every Day Smoker     Packs/day: 1.00     Years: 50.00     Pack years: 50.00     Types: Cigarettes   • Smokeless tobacco: Never Used   Substance Use Topics   • Alcohol use: Not Currently     Alcohol/week: 0.6 oz     Types: 1 Glasses of wine per week     Comment: 3 per week   • Drug use: No     DIET AND EXERCISE:  Weight Change:lowering   BMI Readings from Last 5 Encounters:   11/06/20 16.83 kg/m²   11/02/20 16.46 kg/m²   10/14/20 17.10 kg/m²   09/30/20 16.83 kg/m²   03/02/20 16.70 kg/m²      Diet: common adult  Exercise: no regular exercise program   Review of Systems   Constitutional: Negative for chills, fever and malaise/fatigue.   HENT: Positive for hearing loss. Negative for nosebleeds, sore throat and tinnitus.    Eyes: Negative for blurred vision and double vision.   Respiratory: Negative for cough, hemoptysis, shortness of breath and wheezing.    Cardiovascular: Negative for chest pain, palpitations, orthopnea and leg swelling.   Gastrointestinal: Negative for abdominal pain, blood in stool, diarrhea, melena, nausea and vomiting.   Genitourinary: Negative for hematuria.   Musculoskeletal: Negative for joint pain and myalgias.   Skin: Negative for itching and rash.   Neurological: Negative  "for dizziness, tremors, focal weakness, seizures, weakness and headaches.   Endo/Heme/Allergies: Does not bruise/bleed easily.   Psychiatric/Behavioral: Negative for depression. The patient is not nervous/anxious and does not have insomnia.       Objective:     Vitals:    11/06/20 1029 11/06/20 1034   BP: 151/91 156/90   BP Location: Left arm Left arm   Patient Position: Sitting Sitting   BP Cuff Size: Small adult Small adult   Pulse: 85 82   Weight: 41.7 kg (92 lb)    Height: 1.575 m (5' 2\")       BP Readings from Last 5 Encounters:   11/06/20 156/90   11/02/20 (!) 98/56   10/16/20 128/68   09/30/20 116/70   03/02/20 122/80      Body mass index is 16.83 kg/m².  Physical Exam  Vitals signs reviewed.   Constitutional:       Appearance: Normal appearance.   HENT:      Head: Normocephalic and atraumatic.      Nose: Nose normal.      Mouth/Throat:      Mouth: Mucous membranes are moist.      Pharynx: Oropharynx is clear.   Eyes:      Extraocular Movements: Extraocular movements intact.      Conjunctiva/sclera: Conjunctivae normal.   Neck:      Musculoskeletal: Normal range of motion and neck supple.   Cardiovascular:      Rate and Rhythm: Normal rate and regular rhythm.      Pulses: Normal pulses.           Carotid pulses are 2+ on the right side and 2+ on the left side.       Radial pulses are 2+ on the right side and 2+ on the left side.        Dorsalis pedis pulses are 2+ on the right side and 2+ on the left side.        Posterior tibial pulses are 2+ on the right side and 2+ on the left side.      Heart sounds: Normal heart sounds.      Comments:    Spider telangectasia:       RLE:  None      LLE: none   Varicosities:           RLE: none      LLE: none   Corona phlebectatica:      RLE:  None        LLE:  None   Cording:         RLE:  None     LLE: None     Pulmonary:      Effort: Pulmonary effort is normal.      Breath sounds: Normal breath sounds.   Abdominal:      General: Abdomen is flat. Bowel sounds are normal. "      Palpations: Abdomen is soft.   Musculoskeletal:      Right lower leg: No edema.      Left lower leg: No edema.   Skin:     General: Skin is warm and dry.      Capillary Refill: Capillary refill takes less than 2 seconds.   Neurological:      General: No focal deficit present.      Mental Status: She is alert and oriented to person, place, and time. Mental status is at baseline.   Psychiatric:         Mood and Affect: Mood normal.         Behavior: Behavior normal.         Lab Results   Component Value Date    CHOLSTRLTOT 154 2019    LDL 80 2019    HDL 63 2019    TRIGLYCERIDE 56 2019           Lab Results   Component Value Date    HBA1C 5.5 2019      Lab Results   Component Value Date    SODIUM 139 10/15/2020    POTASSIUM 4.1 10/15/2020    CHLORIDE 105 10/15/2020    CO2 27 10/15/2020    GLUCOSE 83 10/15/2020    BUN 14 10/15/2020    CREATININE 0.88 10/15/2020    IFAFRICA >60 10/15/2020    IFNOTAFR >60 10/15/2020        Lab Results   Component Value Date    WBC 4.4 (L) 10/15/2020    RBC 4.06 (L) 10/15/2020    HEMOGLOBIN 12.5 10/15/2020    HEMATOCRIT 38.1 10/15/2020    MCV 93.8 10/15/2020    MCH 30.8 10/15/2020    MCHC 32.8 (L) 10/15/2020    MPV 10.9 10/15/2020       VASCULAR IMAGING:   Last EKG:   Results for orders placed or performed during the hospital encounter of 10/14/20   EKG (NOW)   Result Value Ref Range    Report       [facility]    Test Date:  2020-10-14  Pt Name:    PINA MOCTEZUMA                 Department: ER  MRN:        [id number]                      Room:  Gender:     Female                       Technician: [id number]  :        1932                   Requested By:ER TRIAGE PROTOCOL  Order #:    [id number]                    Reading MD: FRANCES MALDONADO MD    Measurements  Intervals                                Axis  Rate:       84                           P:  NM:                                      QRS:        67  QRSD:        116                          T:          -55  QT:         395  QTc:        467    Interpretive Statements  Atrial fibrillation  Nonspecific intraventricular conduction delay  Borderline repolarization abnormality  Compared to ECG 2019 14:09:25  Sinus rhythm no longer present  Atrial premature complex(es) no longer present  Electronically Signed On 10- 15:10:52 PDT by FRANCES MALDONADO MD     EKG   Result Value Ref Range    Report       [facility] Emergency Dept.    Test Date:  2020-10-14  Pt Name:    PINA MOCTEZUMA                 Department: ER  MRN:        [id number]                      Room:       [facility]  Gender:     Female                       Technician: [id number]  :        1932                   Requested By:FRANCES MALDONADO  Order #:    [id number]                    Reading MD: FRANCES MALDONADO MD    Measurements  Intervals                                Axis  Rate:       80                           P:          -50  NE:         140                          QRS:        59  QRSD:       116                          T:          45  QT:         432  QTc:        499    Interpretive Statements  UNKNOWN RHYTHM, IRREGULAR RATE  64- 99  NONSPECIFIC INTRAVENTRICULAR CONDUCTION DELAY  LEFT VENTRICULAR HYPERTROPHY  Compared to ECG 10/14/2020 13:13:23  Left ventricular hypertrophy now present  Atrial fibrillation no longer present  Electronically Signed On 10- 16:49:45 PDT by STEPH LOOMIS       AAA duplex 2019  Stable 5.3 cm diameter infrarenal abdominal aortic aneurysm with nonocclusive crescentic thrombus again noted.    CTA complete Ao 2019  1.  Extensive atherosclerotic vascular disease involving the aorta, branch vessels, and coronary arteries.   2.  Maximal diameter and ascending thoracic aorta 3.5 cm. Maximal diameter descending thoracic aorta 3.4 cm.   3.  Infrarenal abdominal aortic aneurysm which measures 4.7 x 4.3 x 10.0 cm.   4.  Emphysematous changes  throughout both lungs with extensive scarring and probable old granulomatous disease.    CTA head/neck 3/2019  1. No hemodynamically significant narrowing of the major intracranial vessels.   2. No acute intracranial hemorrhage.  1. No evidence of flow-limiting stenosis in the cervical carotid or cervical vertebral arteries.    MRI brain 10/2020  1.  No acute abnormality.  2.  Chronic lacunar infarcts in the left thalamus and right cerebellar hemisphere.  3.  Severe chronic microvascular ischemic disease    MRA neck 10/2020  1.  There is atherosclerotic disease at the right carotid bulb causing an approximately 50% stenosis.  2.  There is also atherosclerotic disease at the left carotid bulb causing an approximately 50% stenosis. Please note this MR angiogram is suboptimal in the evaluation of degree of stenosis. CT angiogram of the neck is recommended for further evaluation.   The CT angiogram done on 3/3/2019 did not demonstrate any significant stenosis    MRA brain 10/2020  There is no aneurysm, stenosis or vascular malformation.    Medical Decision Making:  Today's Assessment / Status / Plan:     1. Abdominal aortic aneurysm (AAA) without rupture (HCC)  metoprolol SR (TOPROL XL) 25 MG TABLET SR 24 HR   2. Descending aortic aneurysm (HCC)  metoprolol SR (TOPROL XL) 25 MG TABLET SR 24 HR   3. Ascending aortic aneurysm (HCC)     4. Hypothyroidism, unspecified type     5. Atrial fibrillation and flutter (HCC)     6. History of CVA (cerebrovascular accident)     7. Essential hypertension     8. Chronic obstructive pulmonary disease, unspecified COPD type (HCC)     9. Dyslipidemia  rosuvastatin (CRESTOR) 40 MG tablet    Comp Metabolic Panel    Lipid Profile   10. 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   Presumed cystic medial degeneration with sporadic development.   Echo shows no biscupid Ao valve  No fhx of pmhx of connective tissue disease  - reviewed anatomy, risks, emergency s/s requiring immediate attention and gave handouts.   - if normal exams, then repeat annual echo surveillance, if indications of rapid expansion then repeat CTA  - focus on ARB and BB for BP control  - activity restrictions including no extreme endurance activities, smoking, stimulants, and extreme weight lifting >20lbs     AAA:  4.7cm on most recent imaging, no symptoms  No prior intervention.   - stressed need for timely surveillance   - update CTA now, then annually if benefits outweigh risks   - continue med mgmt   - consider eval with vasc surg if expansion and/or >5.5cm, may be candidate for surgery but would need more careful preop CV assessment and review of candidacy per surg     Antithrombotic therapy:  Indication: AAA  Anti-Platelet/Anti-Coagulant Tx: yes  Antithrombotic therapy plan:  - continue ASA 81mg daily     Lipid Management:   Qualifies for Statin Therapy Based on 2018 ACC/AHA Guidelines: yes   Despite age, due to established polyvascular disease, recommend ongoing statin therpay   Calculated 10-Year Risk of ASCVD (if LDL <189, no CKD G3b/4/5): N/A  Currently on Statin: Yes  NLA/ACC/AHA risk category: Very high:  Evidence of ASCVD   Tx threshold:  non-HDL-C >99, LDL-C >69  Tx goal: non-HDL-C <100,  LDL-C <70  (optional: apoB <80)  At goal:  no  Plan:  - reinforced ongoing TLC measures   - monitor labs   - change atorva to rosuvastatin 40mg daily     Blood Pressure Management:  ACC/AHA (2017) goal <130/80  Home BP at goal:  no  Office BP at goal:  no  Indications of end organ damage:   Echo/EKG: abnormal ascending AA, no LVH    UACR: pending    Renal parenchymal disease:   normal   Ophthalmo: N/A    Device candidate? No  Indicated medications for reduction of expansion rate of TAA:   -Beta-blocker (decreases pulsatile wall stress)   -Losartan (inhibits tissue growth factor beta, decreasing aneurysmal growth rate or rate of expansion)Plan:   Monitoring:   - start/continue home BP monitoring, reviewed correct technique, provide BP log and instructions  - order 24h ABPM:  NO  - monitor lytes/gfr routinely   - contact office if BP consistently >140/>90 to discussion of tx adjustments   Medications:  ACEi/ARB: consider losartan   BB: start metoprolol 25mg ER daily      Glycemic Status: Normal    Smoking: action stage,    reports that she has been smoking cigarettes. She has a 50.00 pack-year smoking history. She has never used smokeless tobacco.   Provided strong recommendation for complete cessation and informed this is the primary contributor to the majority of all ASCVD and cancer-related conditions and can result in significant morbidity and early mortality.   - reviewed resources for cessation including tobacco cessation clinic visit, pharmacotx meds, quit lines  - pending start of nicoderm   - needs to establish quit date   - will monitor at every visit   - face to face counseling for 5min on reduction     Physical Activity: continue healthy activity to improve CV fitness, see care instructions for additional details     Weight Management and Nutrition: Dietary plan was discussed with patient at this visit including DASH, low sodium and/or as outlined in care instructions     Other:   1) hx of CVA, stable  No new focal s/s over baseline   - continue med mgmt    2) PAF, stable  - restart metoprolol  - continue OAC  - f/u with cardiology prn     Instructed to follow-up with PCP for remainder of adult medical needs: yes  We will partner with other providers in the management of established vascular disease and cardiometabolic risk factors.    Studies to Be Obtained: CTA complete aorta -  ordered, aprn to track    Labs to Be Obtained: as noted above     Follow up in: 3 weeks with aprn, then me to follow    Ru Akins M.D.  [facility]   [facility]

## 2020-11-11 ENCOUNTER — HOSPITAL ENCOUNTER (OUTPATIENT)
Dept: LAB | Facility: MEDICAL CENTER | Age: 85
End: 2020-11-11
Attending: FAMILY MEDICINE
Payer: MEDICARE

## 2020-11-11 DIAGNOSIS — E78.5 DYSLIPIDEMIA: ICD-10-CM

## 2020-11-11 LAB
ALBUMIN SERPL BCP-MCNC: 4.1 G/DL (ref 3.2–4.9)
ALBUMIN/GLOB SERPL: 1.4 G/DL
ALP SERPL-CCNC: 115 U/L (ref 30–99)
ALT SERPL-CCNC: 25 U/L (ref 2–50)
ANION GAP SERPL CALC-SCNC: 9 MMOL/L (ref 7–16)
AST SERPL-CCNC: 23 U/L (ref 12–45)
BILIRUB SERPL-MCNC: 0.6 MG/DL (ref 0.1–1.5)
BUN SERPL-MCNC: 15 MG/DL (ref 8–22)
CALCIUM SERPL-MCNC: 9.4 MG/DL (ref 8.5–10.5)
CHLORIDE SERPL-SCNC: 103 MMOL/L (ref 96–112)
CHOLEST SERPL-MCNC: 171 MG/DL (ref 100–199)
CO2 SERPL-SCNC: 25 MMOL/L (ref 20–33)
CREAT SERPL-MCNC: 0.85 MG/DL (ref 0.5–1.4)
FASTING STATUS PATIENT QL REPORTED: NORMAL
GLOBULIN SER CALC-MCNC: 2.9 G/DL (ref 1.9–3.5)
GLUCOSE SERPL-MCNC: 85 MG/DL (ref 65–99)
HDLC SERPL-MCNC: 72 MG/DL
LDLC SERPL CALC-MCNC: 87 MG/DL
POTASSIUM SERPL-SCNC: 3.9 MMOL/L (ref 3.6–5.5)
PROT SERPL-MCNC: 7 G/DL (ref 6–8.2)
SODIUM SERPL-SCNC: 137 MMOL/L (ref 135–145)
TRIGL SERPL-MCNC: 58 MG/DL (ref 0–149)

## 2020-11-11 PROCEDURE — 36415 COLL VENOUS BLD VENIPUNCTURE: CPT

## 2020-11-11 PROCEDURE — 80053 COMPREHEN METABOLIC PANEL: CPT

## 2020-11-11 PROCEDURE — 80061 LIPID PANEL: CPT

## 2020-11-13 ENCOUNTER — TELEPHONE (OUTPATIENT)
Dept: VASCULAR LAB | Facility: MEDICAL CENTER | Age: 85
End: 2020-11-13

## 2020-11-13 ENCOUNTER — HOSPITAL ENCOUNTER (OUTPATIENT)
Dept: RADIOLOGY | Facility: MEDICAL CENTER | Age: 85
End: 2020-11-13
Attending: FAMILY MEDICINE
Payer: MEDICARE

## 2020-11-13 DIAGNOSIS — I71.60 THORACOABDOMINAL AORTIC ANEURYSM, WITHOUT RUPTURE (HCC): ICD-10-CM

## 2020-11-13 PROCEDURE — 71275 CT ANGIOGRAPHY CHEST: CPT

## 2020-11-13 PROCEDURE — 700117 HCHG RX CONTRAST REV CODE 255: Performed by: FAMILY MEDICINE

## 2020-11-13 RX ADMIN — IOHEXOL 100 ML: 350 INJECTION, SOLUTION INTRAVENOUS at 10:00

## 2020-11-13 NOTE — TELEPHONE ENCOUNTER
CTA reviewed, showing slight increased AAA to 4.9cm from prior 4.7cm over last 1.5yrs.    Pending f/u to review on 11/25.   Anticipate nogoing surveillance with repeat AAA duplex in 1 yr.  Repeat CTA if notable ongoing expansion above 5.0cm.   APRN to track

## 2020-11-23 ENCOUNTER — OFFICE VISIT (OUTPATIENT)
Dept: VASCULAR LAB | Facility: MEDICAL CENTER | Age: 85
End: 2020-11-23
Attending: FAMILY MEDICINE
Payer: MEDICARE

## 2020-11-23 VITALS
WEIGHT: 88 LBS | BODY MASS INDEX: 16.2 KG/M2 | DIASTOLIC BLOOD PRESSURE: 74 MMHG | HEIGHT: 62 IN | SYSTOLIC BLOOD PRESSURE: 124 MMHG | HEART RATE: 65 BPM

## 2020-11-23 DIAGNOSIS — E03.9 HYPOTHYROIDISM, UNSPECIFIED TYPE: ICD-10-CM

## 2020-11-23 DIAGNOSIS — Z79.01 CHRONIC ANTICOAGULATION: ICD-10-CM

## 2020-11-23 DIAGNOSIS — I48.91 ATRIAL FIBRILLATION AND FLUTTER (HCC): ICD-10-CM

## 2020-11-23 DIAGNOSIS — I77.810 AORTIC ECTASIA, THORACIC (HCC): ICD-10-CM

## 2020-11-23 DIAGNOSIS — E78.5 DYSLIPIDEMIA: ICD-10-CM

## 2020-11-23 DIAGNOSIS — J44.9 CHRONIC OBSTRUCTIVE PULMONARY DISEASE, UNSPECIFIED COPD TYPE (HCC): ICD-10-CM

## 2020-11-23 DIAGNOSIS — I71.40 ABDOMINAL AORTIC ANEURYSM (AAA) WITHOUT RUPTURE (HCC): ICD-10-CM

## 2020-11-23 DIAGNOSIS — I48.92 ATRIAL FIBRILLATION AND FLUTTER (HCC): ICD-10-CM

## 2020-11-23 DIAGNOSIS — Z86.73 HISTORY OF CVA (CEREBROVASCULAR ACCIDENT): ICD-10-CM

## 2020-11-23 DIAGNOSIS — I65.23 CAROTID ARTERY STENOSIS, ASYMPTOMATIC, BILATERAL: ICD-10-CM

## 2020-11-23 DIAGNOSIS — I10 ESSENTIAL HYPERTENSION: ICD-10-CM

## 2020-11-23 PROCEDURE — 99214 OFFICE O/P EST MOD 30 MIN: CPT | Performed by: FAMILY MEDICINE

## 2020-11-23 PROCEDURE — 99212 OFFICE O/P EST SF 10 MIN: CPT

## 2020-11-23 ASSESSMENT — ENCOUNTER SYMPTOMS
HEADACHES: 0
NERVOUS/ANXIOUS: 0
DIZZINESS: 0
WHEEZING: 0
WEAKNESS: 0
BRUISES/BLEEDS EASILY: 0
NAUSEA: 0
HEMOPTYSIS: 0
DIARRHEA: 0
FEVER: 0
SHORTNESS OF BREATH: 0
BLOOD IN STOOL: 0
SORE THROAT: 0
ABDOMINAL PAIN: 0
DEPRESSION: 0
TREMORS: 0
COUGH: 0
MYALGIAS: 0
CHILLS: 0
PALPITATIONS: 0
DOUBLE VISION: 0
SEIZURES: 0
FOCAL WEAKNESS: 0
INSOMNIA: 0
VOMITING: 0
ORTHOPNEA: 0
BLURRED VISION: 0

## 2020-11-23 ASSESSMENT — FIBROSIS 4 INDEX: FIB4 SCORE: 3.03

## 2020-11-23 NOTE — PROGRESS NOTES
FOLLOW-UP VASCULAR VISIT  Subjective:   Mary Murrieta is a 87 y.o. y.o. female  who presents today 11/23/20 for   Chief Complaint   Patient presents with   • Office Visit     f/u- Abdominal Aortic aneurysm w/o rupture     initially referred by Watson Chaudhari M.D. for eval and med mgmt of AAA, TAA     HPI:    AAA:   Interval hx:  Feeling well. No concerns   Denies abd pain, distal ischemic limb symptoms such as discoloration, cool extremities, claudication.   Current size:  4.9cm per CTA on 11/2020    Thoracic Aortic Aneurysm  Type: ascending ectasia   Current size: 3.5cm for ascending, 3.4cm descending per CTA 6/2019  Current/interval concerns: questions regarding treatment options.  Previously with Dr. Bautista, did not complete 12/2019 imaging surveillance.    Current sx: denies chest, back, abdominal pain, SOB, dysphagia, worsening cough, hemoptysis.    HTN:  Current HTN concerns: Denies   Current ADRs: No  HTN sx:  No current blurred or changed vision, chest pain, shortness of breath, headache, nausea, dizziness/vertigo   Home BP log: not checking   24h ABPM completed: not tested  Adherence to current HTN meds: compliant all of the time    Hyperlipidemia:    Stable, no current concerns  Current treatment: Moderate intensity statin  atorva   Myalgias? No  Other adverse drug reactions? No  Last lipid profile: reviewed with patient, as noted below     Hx of CVA:   Prior lacunar infarcts per MRI  No current worsening focal neuro s/s.     Antiplatelet/anticoagulation:   Yes, Details: Eliquis 5mg BID, no bleeding noted   Hx of PAF.      Hypothyroidism:  Stable, no issues. Tolerating meds.    Tobacco:  Still smoking, same rate of cigarettes.   No quit date or nicoderm started.   Still contemplating but states very difficult    Clinical evidence of ASCVD:    1) hx of MI or other ACS:  no  2) coronary or other revasc procedure: no  3) TIA/ischemic CVA: yes  4) AS-related PAD (including RIDGE <0.9): no  5) other AS  diseases (renal AS, AA due to AS, carotid plaque >50% stenosis): AAA, TAA  6) evidence of AS from imaging (angiography, stress tests, CAC >300 or >74%ile for age/gender): no     Current Outpatient Medications on File Prior to Visit   Medication Sig Dispense Refill   • rosuvastatin (CRESTOR) 40 MG tablet Take 1 Tab by mouth every day for 360 days. 90 Tab 3   • metoprolol SR (TOPROL XL) 25 MG TABLET SR 24 HR Take 1 Tab by mouth every day for 360 days. 90 Tab 3   • pantoprazole (PROTONIX) 40 MG Tablet Delayed Response Take 1 Tab by mouth every day. 90 Tab 1   • alendronate (FOSAMAX) 70 MG Tab Take 1 Tab by mouth every 7 days. 12 Tab 1   • magnesium hydroxide (MILK OF MAGNESIA) 400 MG/5ML Suspension Take 30 mL by mouth 1 time daily as needed. 480 mL 2   • levothyroxine (SYNTHROID) 125 MCG Tab Take 1 Tab by mouth Every morning on an empty stomach. 60 Tab 2   • nicotine (NICODERM) 21 MG/24HR PATCH 24 HR Apply 1 Patch to skin as directed every 24 hours for 42 days. 42 Patch 0   • sennosides-docusate sodium (SENOKOT-S) 8.6-50 MG tablet Take 2 Tabs by mouth 1 time daily as needed. 60 Tab 2   • Magnesium 400 MG Cap Take 1 Each by mouth every day. 90 Cap 1   • apixaban (ELIQUIS) 5mg Tab Take 0.5 Tabs by mouth 2 Times a Day. 60 Tab 0   • albuterol 108 (90 Base) MCG/ACT Aero Soln inhalation aerosol Inhale 2 Puffs by mouth every 6 hours as needed for Shortness of Breath. 1 Inhaler 5   • Fluticasone-Umeclidin-Vilant 100-62.5-25 MCG/INH AEROSOL POWDER, BREATH ACTIVATED Inhale 1 Puff by mouth every day. 3 Each 3   • Nutritional Supplements (VITAMIN D BOOSTER PO) Take 1 Tab by mouth every day.       No current facility-administered medications on file prior to visit.      No Known Allergies    Social History     Tobacco Use   • Smoking status: Current Every Day Smoker     Packs/day: 1.00     Years: 50.00     Pack years: 50.00     Types: Cigarettes   • Smokeless tobacco: Never Used   Substance Use Topics   • Alcohol use: Not Currently  "    Alcohol/week: 0.6 oz     Types: 1 Glasses of wine per week     Comment: 3 per week   • Drug use: No     DIET AND EXERCISE:  Weight Change:lowering   BMI Readings from Last 5 Encounters:   11/23/20 16.10 kg/m²   11/06/20 16.83 kg/m²   11/02/20 16.46 kg/m²   10/14/20 17.10 kg/m²   09/30/20 16.83 kg/m²      Diet: common adult  Exercise: no regular exercise program   Review of Systems   Constitutional: Negative for chills, fever and malaise/fatigue.   HENT: Positive for hearing loss. Negative for nosebleeds, sore throat and tinnitus.    Eyes: Negative for blurred vision and double vision.   Respiratory: Negative for cough, hemoptysis, shortness of breath and wheezing.    Cardiovascular: Negative for chest pain, palpitations, orthopnea and leg swelling.   Gastrointestinal: Negative for abdominal pain, blood in stool, diarrhea, melena, nausea and vomiting.   Genitourinary: Negative for hematuria.   Musculoskeletal: Negative for joint pain and myalgias.   Skin: Negative for itching and rash.   Neurological: Negative for dizziness, tremors, focal weakness, seizures, weakness and headaches.   Endo/Heme/Allergies: Does not bruise/bleed easily.   Psychiatric/Behavioral: Negative for depression. The patient is not nervous/anxious and does not have insomnia.       Objective:     Vitals:    11/23/20 1329   BP: 124/74   BP Location: Left arm   Patient Position: Sitting   BP Cuff Size: Adult   Pulse: 65   Weight: 39.9 kg (88 lb)   Height: 1.575 m (5' 2\")      BP Readings from Last 5 Encounters:   11/23/20 124/74   11/06/20 156/90   11/02/20 (!) 98/56   10/16/20 128/68   09/30/20 116/70      Body mass index is 16.1 kg/m².  Physical Exam  Vitals signs reviewed.   Constitutional:       Appearance: Normal appearance.   HENT:      Head: Normocephalic and atraumatic.      Nose: Nose normal.      Mouth/Throat:      Mouth: Mucous membranes are moist.      Pharynx: Oropharynx is clear.   Eyes:      Extraocular Movements: Extraocular " movements intact.      Conjunctiva/sclera: Conjunctivae normal.   Neck:      Musculoskeletal: Normal range of motion and neck supple.   Cardiovascular:      Rate and Rhythm: Normal rate and regular rhythm.      Pulses: Normal pulses.           Carotid pulses are 2+ on the right side and 2+ on the left side.       Radial pulses are 2+ on the right side and 2+ on the left side.        Dorsalis pedis pulses are 2+ on the right side and 2+ on the left side.        Posterior tibial pulses are 2+ on the right side and 2+ on the left side.      Heart sounds: Normal heart sounds.      Comments:    Spider telangectasia:       RLE:  None      LLE: none   Varicosities:           RLE: none      LLE: none   Corona phlebectatica:      RLE:  None        LLE:  None   Cording:         RLE:  None     LLE: None     Pulmonary:      Effort: Pulmonary effort is normal.      Breath sounds: Normal breath sounds.   Abdominal:      General: Abdomen is flat. Bowel sounds are normal.      Palpations: Abdomen is soft.   Musculoskeletal:      Right lower leg: No edema.      Left lower leg: No edema.   Skin:     General: Skin is warm and dry.      Capillary Refill: Capillary refill takes less than 2 seconds.   Neurological:      General: No focal deficit present.      Mental Status: She is alert and oriented to person, place, and time. Mental status is at baseline.   Psychiatric:         Mood and Affect: Mood normal.         Behavior: Behavior normal.         Lab Results   Component Value Date    CHOLSTRLTOT 171 11/11/2020    LDL 87 11/11/2020    HDL 72 11/11/2020    TRIGLYCERIDE 58 11/11/2020           Lab Results   Component Value Date    HBA1C 5.5 03/03/2019      Lab Results   Component Value Date    SODIUM 137 11/11/2020    POTASSIUM 3.9 11/11/2020    CHLORIDE 103 11/11/2020    CO2 25 11/11/2020    GLUCOSE 85 11/11/2020    BUN 15 11/11/2020    CREATININE 0.85 11/11/2020    IFAFRICA >60 11/11/2020    IFNOTAFR >60 11/11/2020        Lab Results    Component Value Date    WBC 4.4 (L) 10/15/2020    RBC 4.06 (L) 10/15/2020    HEMOGLOBIN 12.5 10/15/2020    HEMATOCRIT 38.1 10/15/2020    MCV 93.8 10/15/2020    MCH 30.8 10/15/2020    MCHC 32.8 (L) 10/15/2020    MPV 10.9 10/15/2020       VASCULAR IMAGING:   Last EKG:   Results for orders placed or performed during the hospital encounter of 10/14/20   EKG (NOW)   Result Value Ref Range    Report       [facility] Emergency Dept[facility]    Test Date:  2020-10-14  Pt Name:    PINA RHIANNA                 Department: ER  MRN:        [id number]                      Room:  Gender:     Female                       Technician: [id number]  :        [id number]                   Requested By:ER TRIAGE PROTOCOL  Order #:    [id number]                    Reading MD: FRANCES MALDONADO MD    Measurements  Intervals                                Axis  Rate:       84                           P:  KS:                                      QRS:        67  QRSD:       116                          T:          -55  QT:         395  QTc:        467    Interpretive Statements  Atrial fibrillation  Nonspecific intraventricular conduction delay  Borderline repolarization abnormality  Compared to ECG 2019 14:09:25  Sinus rhythm no longer present  Atrial premature complex(es) no longer present  Electronically Signed On 10- 15:10:52 PDT by FRANCES MALDONADO MD     EKG   Result Value Ref Range    Report       [facility] Emergency Dept[facility]    Test Date:  2020-10-14  Pt Name:    PINA MOCTEZUMA                 Department: ER  MRN:        [id number]                      Room:       [facility]  Gender:     Female                       Technician: [id number]  :        [id number]                   Requested By:FRANCES MALDONADO  Order #:    [id number]                    Reading MD: FRANCES MALDONADO MD    Measurements  Intervals                                Axis  Rate:       80                           P:          -50  KS:          140                          QRS:        59  QRSD:       116                          T:          45  QT:         432  QTc:        499    Interpretive Statements  UNKNOWN RHYTHM, IRREGULAR RATE  64- 99  NONSPECIFIC INTRAVENTRICULAR CONDUCTION DELAY  LEFT VENTRICULAR HYPERTROPHY  Compared to ECG 10/14/2020 13:13:23  Left ventricular hypertrophy now present  Atrial fibrillation no longer present  Electronically Signed On 10- 16:49:45 PDT by STEPH LOOMIS     AAA duplex 4/2019  Stable 5.3 cm diameter infrarenal abdominal aortic aneurysm with nonocclusive crescentic thrombus again noted.    CTA complete Ao 6/2019  1.  Extensive atherosclerotic vascular disease involving the aorta, branch vessels, and coronary arteries.   2.  Maximal diameter and ascending thoracic aorta 3.5 cm. Maximal diameter descending thoracic aorta 3.4 cm.   3.  Infrarenal abdominal aortic aneurysm which measures 4.7 x 4.3 x 10.0 cm.   4.  Emphysematous changes throughout both lungs with extensive scarring and probable old granulomatous disease.    CTA head/neck 3/2019  1. No hemodynamically significant narrowing of the major intracranial vessels.   2. No acute intracranial hemorrhage.  1. No evidence of flow-limiting stenosis in the cervical carotid or cervical vertebral arteries.    MRI brain 10/2020  1.  No acute abnormality.  2.  Chronic lacunar infarcts in the left thalamus and right cerebellar hemisphere.  3.  Severe chronic microvascular ischemic disease    MRA neck 10/2020  1.  There is atherosclerotic disease at the right carotid bulb causing an approximately 50% stenosis.  2.  There is also atherosclerotic disease at the left carotid bulb causing an approximately 50% stenosis. Please note this MR angiogram is suboptimal in the evaluation of degree of stenosis. CT angiogram of the neck is recommended for further evaluation.   The CT angiogram done on 3/3/2019 did not demonstrate any significant stenosis    MRA brain  10/2020  There is no aneurysm, stenosis or vascular malformation.    CTA complete Ao 11/13/20  Left aortic arch. Patent great vessel origins. Atherosclerosis.  Ascending aorta measures 3.4 x 3.5 cm, stable since prior study. No descending thoracic aortic aneurysm.  Descending aorta is tortuous. Infrarenal abdominal aortic aneurysm has increased in size since prior study. It measures 4.9 x 4.9 cm, previously 4.7 cm. Mural thrombus within the aneurysm.  1. Slight interval enlargement of infrarenal abdominal aortic aneurysm, which now measures 4.9 cm in cross-section, previously 4.7 cm.  2. No other abdominal or thoracic aortic aneurysm.  3. 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  4. Emphysema. Most of the pulmonary nodules described on the prior studies are stable, and some have resolved.    Medical Decision Making:  Today's Assessment / Status / Plan:     1. Abdominal aortic aneurysm (AAA) without rupture (HCC)     2. Aortic ectasia, thoracic (HCC)     3. Hypothyroidism, unspecified type     4. Atrial fibrillation and flutter (HCC)     5. Essential hypertension     6. Dyslipidemia     7. History of CVA (cerebrovascular accident)     8. Carotid artery stenosis, asymptomatic, bilateral     9. Chronic obstructive pulmonary disease, unspecified COPD type (HCC)     10. Chronic anticoagulation       Patient Type: Secondary Prevention, polyvascular     Etiology of Established CVD if Present:   1) ascending AA  2) descending AA  3) AAA  4) hx of lacunar CVA   5) bilat carotid aa stenosis per MRA   6) SMA, moderate stenosis     AAA:  4.9cm on most recent imaging, no symptoms  Slight increase from prior over last 1.5yr   No prior intervention.   - stressed need for timely surveillance   - onogoing surveillance with repeat AAA duplex annually (Nov)  - Repeat CTA if notable ongoing expansion above 5.0cm.  - continue med mgmt   - consider eval with vasc surg if expansion and/or >5.5cm, may be  candidate for surgery but would need more careful preop CV assessment and review of candidacy per surg     Ascending aortic ectasia, stable, no symptoms   3.5cm ascending, new CTA shows no descending AA   Presumed cystic medial degeneration with sporadic development.   Echo shows no biscupid Ao valve  No fhx of pmhx of connective tissue disease  - no major aneurysmal growth  - consider repeat echo in 2 yrs (2022)  - continue med mgmt    Antithrombotic therapy:  Indication: AAA  Anti-Platelet/Anti-Coagulant Tx: yes  Antithrombotic therapy plan:  - continue eliquis 5mg 1/2 tab BID   - will hold ASA for now after reviewed of risks/benefits with patient     Lipid Management:   Qualifies for Statin Therapy Based on 2018 ACC/AHA Guidelines: yes, based upon age and polyvascular disease, ongoing statin therapy is appropriate  Despite age, due to established polyvascular disease, recommend ongoing statin therpay   Calculated 10-Year Risk of ASCVD (if LDL <189, no CKD G3b/4/5): N/A  Currently on Statin: Yes  NLA/ACC/AHA risk category: Very high:  Evidence of ASCVD   Tx threshold:  non-HDL-C >99, LDL-C >69  Tx goal: non-HDL-C <100,  LDL-C <70  (optional: apoB <80)  At goal:  No but stable for age   Plan:  - reinforced ongoing TLC measures   - monitor labs at least annually   - continue rosuvastatin 40mg daily    Blood Pressure Management:  ACC/AHA (2017) goal <130/80  Home BP at goal:  yes  Office BP at goal:  yes  Indications of end organ damage:   Echo/EKG: abnormal ascending AA, no LVH    UACR: neg UA in the past   Renal parenchymal disease:  normal   Ophthalmo: N/A    Device candidate? No  Indicated medications for reduction of expansion rate of TAA:   -Beta-blocker (decreases pulsatile wall stress)   -Losartan (inhibits tissue growth factor beta, decreasing aneurysmal growth rate or rate of expansion)Plan:   Monitoring:   - start/continue home BP monitoring, reviewed correct technique, provide BP log and instructions  -  order 24h ABPM:  NO  - monitor lytes/gfr routinely   - contact office if BP consistently >140/>90 to discussion of tx adjustments   Medications:  ACEi/ARB: consider losartan as next agent   BB: continue metoprolol 25mg ER daily      Glycemic Status: Normal    Smoking: contemplative to preparation stage     reports that she has been smoking cigarettes. She has a 50.00 pack-year smoking history. She has never used smokeless tobacco.   Provided strong recommendation for complete cessation and informed this is the primary contributor to the majority of all ASCVD and cancer-related conditions and can result in significant morbidity and early mortality.   - reviewed resources for cessation including tobacco cessation clinic visit, pharmacotx meds, quit lines  - recommend consideration of reduction and/or completed cessation   - pick quit date, use nicoderm   - will monitor at every visit   - face to face counseling <3min     Physical Activity: continue healthy activity to improve CV fitness, see care instructions for additional details     Weight Management and Nutrition: Dietary plan was discussed with patient at this visit including DASH, low sodium and/or as outlined in care instructions     Other:   1) hx of CVA, stable  No new focal s/s over baseline   - continue med mgmt  - continue eliquis, ok to stop ASA due to risk for bleed based upon age     2) PAF, stable  - continue metoprolol   - continue eliquis - mgmt per cardiology   - defer mgmt to cardiology     3) mod stenosis of SMA, 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    Studies to Be Obtained:  Ao/iliac duplex - 11/2021 - not ordered, aprn to track   Labs to Be Obtained: as noted above     Follow up in: 1yr with aprn or sooner prn     Ru Akins,  M.D.  [facility]   [facility]

## 2020-12-28 ENCOUNTER — TELEPHONE (OUTPATIENT)
Dept: MEDICAL GROUP | Facility: PHYSICIAN GROUP | Age: 85
End: 2020-12-28

## 2020-12-28 NOTE — TELEPHONE ENCOUNTER
ESTABLISHED PATIENT PRE-VISIT PLANNING     Patient was NOT contacted to complete PVP.     Note: Patient will not be contacted if there is no indication to call.     1.  Reviewed notes from the last few office visits within the medical group: Yes    2.  If any orders were placed at last visit or intended to be done for this visit (i.e. 6 mos follow-up), do we have Results/Consult Notes?         •  Labs - Labs ordered, but not to be completed until 1/2020.  Note: If patient appointment is for lab review and patient did not complete labs, check with provider if OK to reschedule patient until labs completed.       •  Imaging - Imaging was not ordered at last office visit.       •  Referrals - No referrals were ordered at last office visit.    3. Is this appointment scheduled as a Hospital Follow-Up? No    4.  Immunizations were updated in Epic using Reconcile Outside Information activity? Yes    5.  Patient is due for the following Health Maintenance Topics:   Health Maintenance Due   Topic Date Due   • IMM ZOSTER VACCINES (1 of 2) [birth year]1982   • Annual Wellness Visit  05/12/2019   • Annual Pulmonary Function Test / Spirometry  05/16/2020       6.  AHA (Pulse8) form printed for Provider? No, already completed

## 2021-01-08 DIAGNOSIS — Z23 NEED FOR VACCINATION: ICD-10-CM

## 2021-01-21 DIAGNOSIS — E78.2 MIXED HYPERLIPIDEMIA: ICD-10-CM

## 2021-01-25 RX ORDER — ATORVASTATIN CALCIUM 40 MG/1
40 TABLET, FILM COATED ORAL
Qty: 30 TAB | Refills: 3 | OUTPATIENT
Start: 2021-01-25

## 2021-02-01 DIAGNOSIS — J44.9 CHRONIC OBSTRUCTIVE PULMONARY DISEASE, UNSPECIFIED COPD TYPE (HCC): ICD-10-CM

## 2021-02-02 RX ORDER — ALBUTEROL SULFATE 90 UG/1
2 AEROSOL, METERED RESPIRATORY (INHALATION) EVERY 6 HOURS PRN
Qty: 1 EACH | Refills: 2 | Status: SHIPPED | OUTPATIENT
Start: 2021-02-02 | End: 2021-05-11 | Stop reason: SDUPTHER

## 2021-02-06 ENCOUNTER — IMMUNIZATION (OUTPATIENT)
Dept: FAMILY PLANNING/WOMEN'S HEALTH CLINIC | Facility: IMMUNIZATION CENTER | Age: 86
End: 2021-02-06
Attending: INTERNAL MEDICINE
Payer: MEDICARE

## 2021-02-06 DIAGNOSIS — Z23 NEED FOR VACCINATION: ICD-10-CM

## 2021-02-06 DIAGNOSIS — Z23 ENCOUNTER FOR VACCINATION: Primary | ICD-10-CM

## 2021-02-06 PROCEDURE — 91301 MODERNA SARS-COV-2 VACCINE: CPT

## 2021-02-06 PROCEDURE — 0011A MODERNA SARS-COV-2 VACCINE: CPT

## 2021-02-18 DIAGNOSIS — E78.5 DYSLIPIDEMIA: ICD-10-CM

## 2021-02-18 DIAGNOSIS — E78.2 MIXED HYPERLIPIDEMIA: ICD-10-CM

## 2021-02-18 RX ORDER — ATORVASTATIN CALCIUM 40 MG/1
40 TABLET, FILM COATED ORAL
Qty: 100 TABLET | Refills: 2 | OUTPATIENT
Start: 2021-02-18

## 2021-02-18 RX ORDER — ROSUVASTATIN CALCIUM 40 MG/1
40 TABLET, COATED ORAL DAILY
Qty: 100 TABLET | Refills: 2 | Status: SHIPPED | OUTPATIENT
Start: 2021-02-18 | End: 2021-06-01

## 2021-02-18 NOTE — TELEPHONE ENCOUNTER
Received request via: Pharmacy    Was the patient seen in the last year in this department? Yes lov 11/2/2020    Does the patient have an active prescription (recently filled or refills available) for medication(s) requested? No

## 2021-03-05 DIAGNOSIS — E78.2 MIXED HYPERLIPIDEMIA: ICD-10-CM

## 2021-03-08 RX ORDER — ATORVASTATIN CALCIUM 40 MG/1
40 TABLET, FILM COATED ORAL
Qty: 100 TABLET | Refills: 0 | OUTPATIENT
Start: 2021-03-08

## 2021-04-02 ENCOUNTER — PATIENT MESSAGE (OUTPATIENT)
Dept: HEALTH INFORMATION MANAGEMENT | Facility: OTHER | Age: 86
End: 2021-04-02

## 2021-04-08 ENCOUNTER — OFFICE VISIT (OUTPATIENT)
Dept: MEDICAL GROUP | Facility: PHYSICIAN GROUP | Age: 86
End: 2021-04-08
Payer: MEDICARE

## 2021-04-08 VITALS
RESPIRATION RATE: 12 BRPM | TEMPERATURE: 98.8 F | BODY MASS INDEX: 16.1 KG/M2 | SYSTOLIC BLOOD PRESSURE: 166 MMHG | DIASTOLIC BLOOD PRESSURE: 90 MMHG | HEIGHT: 62 IN | HEART RATE: 81 BPM | OXYGEN SATURATION: 93 %

## 2021-04-08 DIAGNOSIS — H91.93 HEARING DIFFICULTY OF BOTH EARS: ICD-10-CM

## 2021-04-08 DIAGNOSIS — I10 ESSENTIAL HYPERTENSION: ICD-10-CM

## 2021-04-08 DIAGNOSIS — E03.9 HYPOTHYROIDISM, UNSPECIFIED TYPE: ICD-10-CM

## 2021-04-08 DIAGNOSIS — R22.42 LUMP OF SKIN OF LEFT LOWER EXTREMITY: ICD-10-CM

## 2021-04-08 DIAGNOSIS — E55.9 VITAMIN D DEFICIENCY: ICD-10-CM

## 2021-04-08 PROCEDURE — 99214 OFFICE O/P EST MOD 30 MIN: CPT | Performed by: INTERNAL MEDICINE

## 2021-04-08 RX ORDER — METOPROLOL SUCCINATE 50 MG/1
50 TABLET, EXTENDED RELEASE ORAL DAILY
Qty: 90 TABLET | Refills: 1 | Status: SHIPPED | OUTPATIENT
Start: 2021-04-08 | End: 2021-04-09 | Stop reason: SDUPTHER

## 2021-04-08 RX ORDER — LISINOPRIL 10 MG/1
10 TABLET ORAL DAILY
Qty: 90 TABLET | Refills: 1 | Status: SHIPPED | OUTPATIENT
Start: 2021-04-08 | End: 2021-04-09 | Stop reason: SDUPTHER

## 2021-04-08 RX ORDER — LEVOTHYROXINE SODIUM 112 UG/1
TABLET ORAL
COMMUNITY
Start: 2021-02-27 | End: 2021-05-21

## 2021-04-08 ASSESSMENT — PATIENT HEALTH QUESTIONNAIRE - PHQ9: CLINICAL INTERPRETATION OF PHQ2 SCORE: 0

## 2021-04-08 NOTE — PROGRESS NOTES
Established Patient    Chief Complaint   Patient presents with   • Other     lump on buttocks on the left side. A little discomfort when sitting    • Medication Refill     needs a refill       Subjective:     HPI:   Mary presents today with the following.    1. Essential hypertension  -Blood pressure is high today, systolic 170, repeated 160, she is taking systolic 1 60-1 70, she is taking metoprolol 25 mg daily, does not check blood pressure at home, denies related symptoms    3. Lump of skin of left lower extremity  Reported lump in the left buttock, not painful, symptomatic when she is sitting up, denies any signs symptoms of infection      Patient Active Problem List    Diagnosis Date Noted   • Weakness 10/15/2020   • Hypothyroidism 02/08/2016   • Elevated brain natriuretic peptide (BNP) level 10/15/2020   • Thrombocytopenia ([facility]) 11/21/2019   • Hypertension 06/21/2019   • Lump of skin of left lower extremity 04/08/2021   • Vitamin D deficiency 04/08/2021   • Lightheadedness 10/15/2020   • Atrial fibrillation and flutter ([facility]) 10/15/2020   • Elevated troponin 10/15/2020   • Post-nasal drip 11/21/2019   • Age-related osteoporosis without current pathological fracture 11/21/2019   • Stage 3 chronic kidney disease 11/21/2019   • Slow transit constipation 11/21/2019   • COPD (chronic obstructive pulmonary disease) ([facility]) 05/28/2019   • History of CVA (cerebrovascular accident) 03/04/2019   • Ataxia 03/03/2019   • Gastroesophageal reflux disease without esophagitis 05/11/2018   • Hearing difficulty 05/11/2018   • Aortic aneurysm without rupture ([facility]) 03/28/2018   • Compression fracture of fourth lumbar vertebra ([facility]) 03/28/2018   • Protein-calorie malnutrition ([facility]) 03/28/2018   • Current smoker 12/07/2016   • Mixed hyperlipidemia 12/07/2016       Current Outpatient Medications on File Prior to Visit   Medication Sig Dispense Refill   • levothyroxine (SYNTHROID) 112 MCG Tab      • rosuvastatin (CRESTOR) 40 MG tablet  "Take 1 tablet by mouth every day for 360 days. 100 tablet 2   • albuterol 108 (90 Base) MCG/ACT Aero Soln inhalation aerosol Inhale 2 Puffs every 6 hours as needed for Shortness of Breath. 1 Each 2   • metoprolol SR (TOPROL XL) 25 MG TABLET SR 24 HR Take 1 Tab by mouth every day for 360 days. 90 Tab 3   • pantoprazole (PROTONIX) 40 MG Tablet Delayed Response Take 1 Tab by mouth every day. 90 Tab 1   • alendronate (FOSAMAX) 70 MG Tab Take 1 Tab by mouth every 7 days. 12 Tab 1   • magnesium hydroxide (MILK OF MAGNESIA) 400 MG/5ML Suspension Take 30 mL by mouth 1 time daily as needed. 480 mL 2   • levothyroxine (SYNTHROID) 125 MCG Tab Take 1 Tab by mouth Every morning on an empty stomach. 60 Tab 2   • sennosides-docusate sodium (SENOKOT-S) 8.6-50 MG tablet Take 2 Tabs by mouth 1 time daily as needed. 60 Tab 2   • Magnesium 400 MG Cap Take 1 Each by mouth every day. 90 Cap 1   • apixaban (ELIQUIS) 5mg Tab Take 0.5 Tabs by mouth 2 Times a Day. 60 Tab 0   • Fluticasone-Umeclidin-Vilant 100-62.5-25 MCG/INH AEROSOL POWDER, BREATH ACTIVATED Inhale 1 Puff by mouth every day. 3 Each 3   • Nutritional Supplements (VITAMIN D BOOSTER PO) Take 1 Tab by mouth every day.       No current facility-administered medications on file prior to visit.       Allergies, past medical history, past surgical history, family history, social history reviewed and updated    ROS:  All other systems reviewed and are negative except as stated in the HPI     Physical Exam:     BP (!) 166/90 (BP Location: Left arm, Patient Position: Sitting, BP Cuff Size: Adult)   Pulse 81   Temp 37.1 °C (98.8 °F) (Temporal)   Resp 12   Ht 1.575 m (5' 2\")   LMP 03/03/1970 (Approximate)   SpO2 93%   BMI 16.10 kg/m²   General: Normal appearing. No distress.  ENT: oropharynx without exudates.    Eyes: conjunctiva clear lids without ptosis  Pulmonary: Clear to ausculation.  Normal effort.   Cardiovascular: Regular rate and rhythm  Abdomen: Soft, nontender,  Lymph: " No cervical or supraclavicular palpable lymph nodes  Psych: Normal mood and affect.     I have reviewed pertinent labs and diagnostic tests associated with this visit with specific comments listed under the assessment and plan below      Assessment and Plan:     88 y.o. female with the following issues.    1. Essential hypertension  - VITAMIN B12; Future  - MAGNESIUM; Future  - Comp Metabolic Panel; Future  - lisinopril (PRINIVIL) 10 MG Tab; Take 1 tablet by mouth every day.  Dispense: 90 tablet; Refill: 1  >> Increase metoprolol SR from 25 mg to 50 mg daily    -discussion in details on target blood pressure goal, advised monitoring BP closely at home.  Have BP log to present at follow-up visit or send through my chart.   -Advised low-salt diet, healthy dietary option include plenty of vegetable, reduce carb and sugar, regular exercise as tolerated, healthy fat/protein, also avoid alcohol, no NSAIDs  If symptoms worsen or persist patient can return to clinic for reevaluation.  Red flags and strict emergency room precautions discussed.  Discussed side effects of medication. Patient confirmed understanding of information.        2. Hypothyroidism, unspecified type  - TSH+FREE T4  - T3 FREE; Future    3. Lump of skin of left lower extremity  - US-EXTREMITY NON VASCULAR UNILATERAL LEFT; Future    4. Vitamin D deficiency  - VITAMIN D,25 HYDROXY; Future    5. Hearing difficulty of both ears  - REFERRAL TO AUDIOLOGY    Follow Up:      Return in about 4 weeks (around 5/6/2021) for follow up.  Uncontrolled hypertension, labs,  Please note that this dictation was created using voice recognition software. I have made every reasonable attempt to correct obvious errors, but I expect that there are errors of grammar and possibly content that I did not discover before finalizing the note.    Signed by: Watson Chaudhari M.D.

## 2021-04-09 DIAGNOSIS — I10 ESSENTIAL HYPERTENSION: ICD-10-CM

## 2021-04-09 RX ORDER — LISINOPRIL 10 MG/1
10 TABLET ORAL DAILY
Qty: 100 TABLET | Refills: 1 | Status: SHIPPED | OUTPATIENT
Start: 2021-04-09

## 2021-04-09 RX ORDER — METOPROLOL SUCCINATE 50 MG/1
50 TABLET, EXTENDED RELEASE ORAL DAILY
Qty: 100 TABLET | Refills: 1 | Status: SHIPPED | OUTPATIENT
Start: 2021-04-09

## 2021-04-12 ENCOUNTER — PATIENT OUTREACH (OUTPATIENT)
Dept: HEALTH INFORMATION MANAGEMENT | Facility: OTHER | Age: 86
End: 2021-04-12

## 2021-04-12 NOTE — PROGRESS NOTES
Outcome: SCHEDULED. JOSE D    Please transfer to Patient Outreach Team at [phone] when patient returns call.    WebIZ Checked & Epic Updated:  no    [facility]Connect Verified: no    Attempt # 1

## 2021-04-13 ENCOUNTER — HOSPITAL ENCOUNTER (OUTPATIENT)
Dept: RADIOLOGY | Facility: MEDICAL CENTER | Age: 86
End: 2021-04-13
Attending: INTERNAL MEDICINE
Payer: MEDICARE

## 2021-04-13 DIAGNOSIS — R22.42 LUMP OF SKIN OF LEFT LOWER EXTREMITY: ICD-10-CM

## 2021-04-13 PROCEDURE — 76882 US LMTD JT/FCL EVL NVASC XTR: CPT | Mod: LT

## 2021-05-03 ENCOUNTER — HOSPITAL ENCOUNTER (EMERGENCY)
Facility: MEDICAL CENTER | Age: 86
End: 2021-05-03
Attending: EMERGENCY MEDICINE | Admitting: EMERGENCY MEDICINE
Payer: MEDICARE

## 2021-05-03 ENCOUNTER — HOSPITAL ENCOUNTER (EMERGENCY)
Facility: MEDICAL CENTER | Age: 86
End: 2021-05-03
Attending: EMERGENCY MEDICINE
Payer: MEDICARE

## 2021-05-03 VITALS
HEART RATE: 80 BPM | TEMPERATURE: 99.5 F | RESPIRATION RATE: 18 BRPM | DIASTOLIC BLOOD PRESSURE: 78 MMHG | BODY MASS INDEX: 15.12 KG/M2 | HEIGHT: 63 IN | WEIGHT: 85.32 LBS | SYSTOLIC BLOOD PRESSURE: 121 MMHG | OXYGEN SATURATION: 95 %

## 2021-05-03 VITALS
DIASTOLIC BLOOD PRESSURE: 73 MMHG | HEART RATE: 82 BPM | RESPIRATION RATE: 16 BRPM | TEMPERATURE: 97.4 F | BODY MASS INDEX: 16.2 KG/M2 | SYSTOLIC BLOOD PRESSURE: 134 MMHG | OXYGEN SATURATION: 92 % | HEIGHT: 62 IN | WEIGHT: 88 LBS

## 2021-05-03 DIAGNOSIS — R58 ECCHYMOSIS: ICD-10-CM

## 2021-05-03 DIAGNOSIS — Z51.89 ENCOUNTER FOR WOUND RE-CHECK: ICD-10-CM

## 2021-05-03 DIAGNOSIS — D69.6 THROMBOCYTOPENIA (HCC): ICD-10-CM

## 2021-05-03 DIAGNOSIS — S41.112D ARM LACERATION, LEFT, SUBSEQUENT ENCOUNTER: ICD-10-CM

## 2021-05-03 DIAGNOSIS — T14.8XXA HEMATOMA: ICD-10-CM

## 2021-05-03 LAB
BASOPHILS # BLD AUTO: 1.2 % (ref 0–1.8)
BASOPHILS # BLD: 0.06 K/UL (ref 0–0.12)
EOSINOPHIL # BLD AUTO: 0.03 K/UL (ref 0–0.51)
EOSINOPHIL NFR BLD: 0.6 % (ref 0–6.9)
ERYTHROCYTE [DISTWIDTH] IN BLOOD BY AUTOMATED COUNT: 52.4 FL (ref 35.9–50)
HCT VFR BLD AUTO: 41.1 % (ref 37–47)
HGB BLD-MCNC: 13.6 G/DL (ref 12–16)
IMM GRANULOCYTES # BLD AUTO: 0.02 K/UL (ref 0–0.11)
IMM GRANULOCYTES NFR BLD AUTO: 0.4 % (ref 0–0.9)
INR PPP: 1.34 (ref 0.87–1.13)
LYMPHOCYTES # BLD AUTO: 1.07 K/UL (ref 1–4.8)
LYMPHOCYTES NFR BLD: 21.6 % (ref 22–41)
MCH RBC QN AUTO: 32.2 PG (ref 27–33)
MCHC RBC AUTO-ENTMCNC: 33.1 G/DL (ref 33.6–35)
MCV RBC AUTO: 97.2 FL (ref 81.4–97.8)
MONOCYTES # BLD AUTO: 0.51 K/UL (ref 0–0.85)
MONOCYTES NFR BLD AUTO: 10.3 % (ref 0–13.4)
NEUTROPHILS # BLD AUTO: 3.27 K/UL (ref 2–7.15)
NEUTROPHILS NFR BLD: 65.9 % (ref 44–72)
NRBC # BLD AUTO: 0 K/UL
NRBC BLD-RTO: 0 /100 WBC
PLATELET # BLD AUTO: 70 K/UL (ref 164–446)
PMV BLD AUTO: 11.1 FL (ref 9–12.9)
PROTHROMBIN TIME: 17 SEC (ref 12–14.6)
RBC # BLD AUTO: 4.23 M/UL (ref 4.2–5.4)
WBC # BLD AUTO: 5 K/UL (ref 4.8–10.8)

## 2021-05-03 PROCEDURE — 85025 COMPLETE CBC W/AUTO DIFF WBC: CPT

## 2021-05-03 PROCEDURE — 700101 HCHG RX REV CODE 250: Performed by: EMERGENCY MEDICINE

## 2021-05-03 PROCEDURE — 304217 HCHG IRRIGATION SYSTEM

## 2021-05-03 PROCEDURE — 99284 EMERGENCY DEPT VISIT MOD MDM: CPT

## 2021-05-03 PROCEDURE — 85610 PROTHROMBIN TIME: CPT

## 2021-05-03 PROCEDURE — 99283 EMERGENCY DEPT VISIT LOW MDM: CPT

## 2021-05-03 RX ORDER — LIDOCAINE HYDROCHLORIDE AND EPINEPHRINE 10; 10 MG/ML; UG/ML
5 INJECTION, SOLUTION INFILTRATION; PERINEURAL ONCE
Status: COMPLETED | OUTPATIENT
Start: 2021-05-03 | End: 2021-05-03

## 2021-05-03 RX ADMIN — LIDOCAINE HYDROCHLORIDE,EPINEPHRINE BITARTRATE 5 ML: 10; .01 INJECTION, SOLUTION INFILTRATION; PERINEURAL at 20:45

## 2021-05-03 ASSESSMENT — FIBROSIS 4 INDEX
FIB4 SCORE: 3.07
FIB4 SCORE: 3.07

## 2021-05-03 NOTE — ED NOTES
ERP and social work consulted on pt transportation home. ERP agreed that it is appropriate for pt to go home in the morning via taxi with a walker provided by the hospital. Sveta  notified of plan and agreed with plan to get pt transported home.

## 2021-05-03 NOTE — ED NOTES
Pt RLE shin dressed with non stick dressing, dry sterile gauze, and wrapped with kerlex. Pt instructed on care of blister and wound and verbalized understanding.

## 2021-05-03 NOTE — DISCHARGE PLANNING
Medical Social Work    MSW received a call from bedside RN that pt is in need of a ride home.  Per RN pt is refusing to contact family for a ride home.  Home address: [address] [place] [address] was verified by pt.  SW reviewed pt's chart and pt has not received transportation assistance in the past.  MSW provided bedside RN with cab voucher (# [phone]) for pt to return home safely.

## 2021-05-03 NOTE — ED NOTES
Report to Skylar PICHARDO. Pt states her daughters are unable to pick her up and that she lives alone. Pt states she has a key to get into her house. Pt states she only has 1 step to get into her house and everyday she uses her walker and the railing to get up that step to go into and out of her house. Pt a/o x 4 GCS 15. Pt resting comfortably.

## 2021-05-03 NOTE — ED TRIAGE NOTES
Vitals:    05/03/21 0240   BP: (!) 187/92   Pulse: 91   Resp: 16   Temp: 36.3 °C (97.4 °F)   SpO2: 94%     Chief Complaint   Patient presents with   • Wound Check     skin tear to left forearm     Pt got a dime size skin tear to her left forearm yesterday. She was unable to control the bleeding at home and now has a significant ecchymosis from her forearm to her wrist. She is a very sweet but frail appearing lady who lives alone. She called EMS to help with the bleeding. A dressing was applied and bleeding appears to be controlled. She is also hypertensive and states she is compliant with her metoprolol and lisinopril. Pt also takes Eliquis but does not know what for.

## 2021-05-03 NOTE — ED PROVIDER NOTES
ED Provider Note    CHIEF COMPLAINT  Chief Complaint   Patient presents with   • Wound Check     skin tear to left forearm       HPI  Mary Murrieta is a 88 y.o. female who presents for evaluation of wound check for a skin tear to her left forearm with surrounding ecchymosis.  Patient also notes a similar area to her right shin and does not remember trauma to the shin.  She does note that she is on Eliquis.  She otherwise has no complaints.    REVIEW OF SYSTEMS  Constitutional: No fevers or chills  Skin: Bruising to left forearm and right shin  Musculoskeletal: No significant pain associated with bruising.    Neurologic: No sensory or motor changes to either affected extremity  Heme: Bruises and bleeds easily due to Eliquis use  Immuno: No hx of recurrent infections      PAST MEDICAL HISTORY   has a past medical history of Aortic aneurysm without rupture ([facility]), Atherosclerosis of aorta (HCC) (3/27/2019), Cataract, Compression fracture of fourth lumbar vertebra (HCC) (3/28/2018), Dental disorder (03/20/2018), Emphysema of lung ([facility]), Gastroesophageal reflux disease without esophagitis (5/11/2018), Hiatus hernia syndrome, Hypothyroidism (2/8/2016), Mixed hyperlipidemia (12/7/2016), and Protein-calorie malnutrition (HCC) (3/28/2018).    SOCIAL HISTORY  Social History     Tobacco Use   • Smoking status: Current Every Day Smoker     Packs/day: 1.00     Years: 50.00     Pack years: 50.00     Types: Cigarettes   • Smokeless tobacco: Never Used   Substance and Sexual Activity   • Alcohol use: Not Currently     Alcohol/week: 0.6 oz     Types: 1 Glasses of wine per week     Comment: 3 per week   • Drug use: No   • Sexual activity: Never     Partners: Male       SURGICAL HISTORY   has a past surgical history that includes tonsillectomy (1938); thyroidectomy; and abdominal hysterectomy total (1986).    CURRENT MEDICATIONS  Home Medications    **Home medications have not yet been reviewed for this encounter**    "      ALLERGIES  No Known Allergies    PHYSICAL EXAM  VITAL SIGNS: BP (!) 187/92   Pulse 91   Temp 36.3 °C (97.4 °F) (Temporal)   Resp 16   Ht 1.575 m (5' 2\")   Wt 39.9 kg (88 lb)   LMP 03/03/1970 (Approximate)   SpO2 94%   BMI 16.10 kg/m²    Gen: Alert in no apparent distress.  Calm, conversant  HEENT: No signs of trauma, Bilateral external ears normal, Nose normal. Conjunctiva normal, Non-icteric.   Cardiovascular: Regular rate and rhythm.  Capillary refill less than 3 seconds to all extremities, 2+ distal pulses.  Thorax & Lungs: Speaks in full sentences, no respiratory distress, no tachypnea  Skin: Warm, Dry.  Areas of ecchymosis is noted.  Small skin oozing dark blood tear noted to left forearm as noted in picture below.  Extremities: Intact distal pulses, No edema to all extremities  Neurologic: Alert , no facial droop, grossly normal coordination and strength  Psychiatric: Affect pleasant                    COURSE & MEDICAL DECISION MAKING  Patient arrives for evaluation of bleeding and ecchymosis.  The bleeding is very minimal on my evaluation but will likely require mild compression for the next 24 to 48 hours as she is on Eliquis.  I feel the ecchymosis is expected in the forearm and, given that she likely bumped her shin as there is a small fluid-filled apparent blood blister to the upper portion of the shin, I would expect ecchymosis to that extremity as well.  She does not have any sensory changes or apparent vascular deficits to either extremity and is able to use both of them.  She stated understanding that this is simply a complication of being on Eliquis and is not dangerous.  Her forearm was rewrapped with a nonstick dressing and I asked nursing to wrap the fluid-filled blister as it may rupture and worried the patient.  She will follow up with her primary care physician or return if her symptoms worsen or change in any way.  At this point I do not suspect that labs or imaging will benefit " the patient or .    FINAL IMPRESSION  1. Encounter for wound re-check    2. Ecchymosis        Electronically signed by: Edwin Javed M.D., 5/3/2021 2:52 AM

## 2021-05-03 NOTE — ED NOTES
Dressing to LFA removed and still showed moderate amounts of bleeding. Redressed wound with a clean to dry dressing and dressing is dry and intact.

## 2021-05-04 NOTE — ED TRIAGE NOTES
"Mary Shawlock   88 y.o. female   Patient presents with:  Chief Complaint   Patient presents with   • Wound Check     family noticed continued bleeding on left forearm, brought to ED for evaluation.  bleeding controlled with gauze and tegaderm. Patient also has wound on RIGHT leg that family would like to be evaluated.      Pt ambulated steady gait to triage for above complaint.     Pt is alert and oriented, speaking in full sentences, follows commands and responds appropriately to questions. NAD. Resp are even and unlabored.     Pt placed in waiting room. Pt educated on triage process. Pt encouraged to alert staff for any changes.    Patient and staff wearing appropriate PPE    BP (!) 94/60   Pulse (!) 105   Temp 37.7 °C (99.8 °F) (Oral)   Resp 18   Ht 1.6 m (5' 3\")   Wt 38.7 kg (85 lb 5.1 oz)   LMP 03/03/1970 (Approximate)   SpO2 92%   BMI 15.11 kg/m²     "

## 2021-05-04 NOTE — ED PROVIDER NOTES
ED Provider Note    CHIEF COMPLAINT  Chief Complaint   Patient presents with   • Wound Check     family noticed continued bleeding on left forearm, brought to ED for evaluation.  bleeding controlled with gauze and tegaderm. Patient also has wound on RIGHT leg that family would like to be evaluated.        HPI  Mary Murrieta is a 88 y.o. female who presents to the emergency department for her second visit in the last 24 hours for wound.  The patient had a skin tear and a large underlying hematoma of the left upper extremity there was placed in a pressure dressing however if she is continue to bleed throughout throughout the day.  There is believe that she was on Eliquis but according to her daughter at the bedside she actually has not refilled the medication since October so she is currently not on it.  However it continues to ooze.  She is not complaining of a lot of pain  She denies any bloody stools or easy bleeding from her gums    REVIEW OF SYSTEMS  Positives as above. Pertinent negatives include nausea vomiting fevers leg pain difficulty walking arm pain  All other review of systems are negative    PAST MEDICAL HISTORY   has a past medical history of Aortic aneurysm without rupture (HCC), Atherosclerosis of aorta (HCC) (3/27/2019), Cataract, Compression fracture of fourth lumbar vertebra (HCC) (3/28/2018), Dental disorder (03/20/2018), Emphysema of lung (HCC), Gastroesophageal reflux disease without esophagitis (5/11/2018), Hiatus hernia syndrome, Hypothyroidism (2/8/2016), Mixed hyperlipidemia (12/7/2016), and Protein-calorie malnutrition (HCC) (3/28/2018).    SOCIAL HISTORY  Social History     Tobacco Use   • Smoking status: Current Every Day Smoker     Packs/day: 1.00     Years: 50.00     Pack years: 50.00     Types: Cigarettes   • Smokeless tobacco: Never Used   Substance and Sexual Activity   • Alcohol use: Not Currently     Alcohol/week: 0.6 oz     Types: 1 Glasses of wine per week     Comment: 3 per  "week   • Drug use: No   • Sexual activity: Never     Partners: Male       SURGICAL HISTORY   has a past surgical history that includes tonsillectomy (1938); thyroidectomy; and abdominal hysterectomy total (1986).    CURRENT MEDICATIONS  Home Medications     Reviewed by Octavio Mitchell R.N. (Registered Nurse) on 05/03/21 at 1743  Med List Status: Partial   Medication Last Dose Status   albuterol 108 (90 Base) MCG/ACT Aero Soln inhalation aerosol  Active   alendronate (FOSAMAX) 70 MG Tab  Active   apixaban (ELIQUIS) 5mg Tab  Active   Fluticasone-Umeclidin-Vilant 100-62.5-25 MCG/INH AEROSOL POWDER, BREATH ACTIVATED  Active   levothyroxine (SYNTHROID) 112 MCG Tab  Active   levothyroxine (SYNTHROID) 125 MCG Tab  Active   lisinopril (PRINIVIL) 10 MG Tab  Active   Magnesium 400 MG Cap  Active   magnesium hydroxide (MILK OF MAGNESIA) 400 MG/5ML Suspension  Active   metoprolol SR (TOPROL XL) 50 MG TABLET SR 24 HR  Active   Nutritional Supplements (VITAMIN D BOOSTER PO)  Active   pantoprazole (PROTONIX) 40 MG Tablet Delayed Response  Active   rosuvastatin (CRESTOR) 40 MG tablet  Active   sennosides-docusate sodium (SENOKOT-S) 8.6-50 MG tablet  Active                ALLERGIES  No Known Allergies    PHYSICAL EXAM  VITAL SIGNS: BP (!) 94/60   Pulse (!) 105   Temp 37.7 °C (99.8 °F) (Oral)   Resp 18   Ht 1.6 m (5' 3\")   Wt 38.7 kg (85 lb 5.1 oz)   LMP 03/03/1970 (Approximate)   SpO2 92%   BMI 15.11 kg/m²    Pulse ox interpretation: I interpret this pulse ox as normal.  Constitutional: Alert in no apparent distress.  HENT: Normocephalic, Atraumatic, MMM  Eyes: PERound. Conjunctiva normal, non-icteric.   Heart: Regular rate and rhythm, no murmurs.    Lungs: Clear to auscultation bilaterally. No resp distress, breath sounds equal  EXT: Left upper extremity large underlying bruise all the way involving the dorsal forearm and a very small open laceration approximately 2 cm part of it is little deep the rest is a " skin tear with mild active ooze  Right lower extremity dressing in place with an underlying hematoma please see my partners prior images  Skin: Warm, Dry, No erythema, No rash.   Neurologic: Alert and oriented, Grossly non-focal.       DIFFERENTIAL DIAGNOSIS AND WORK UP PLAN    This is a 88 y.o. female who presents with actively oozing wound not on Eliquis she has a history of mild thrombocytopenia based on her laboratory analysis will repeat and see how low it is today that that could be the cause of her easy bleeding.  Also inject the wound and treated with Steri-Strips as I believe her skin is too thin for sutures    Pertinent Lab Findings  Labs Reviewed   CBC WITH DIFFERENTIAL - Abnormal; Notable for the following components:       Result Value    MCHC 33.1 (*)     RDW 52.4 (*)     Platelet Count 70 (*)     Lymphocytes 21.60 (*)     All other components within normal limits    Narrative:     Indicate which anticoagulants the patient is on:->NONE   PROTHROMBIN TIME - Abnormal; Notable for the following components:    PT 17.0 (*)     INR 1.34 (*)     All other components within normal limits    Narrative:     Indicate which anticoagulants the patient is on:->NONE       Radiology  No orders to display     The radiologist's interpretation of all radiological studies have been reviewed by me.    LACERATION REPAIR PROCEDURE NOTE  The patient's identification was confirmed and consent was obtained.  Site: Left forearm  Sterile procedures observed  Anesthetic used (type and amt): Lidocaine 1% with epi 3 cc  Suture type/size: Steri-Strips  Length: 2 cm  3 Steri-Strips were applied over the wound with improvement in her bleeding, she was placed with a smaller less bulky dressing  Site anesthetized, irrigated with NS, explored without evidence of foreign body, wound well approximated, site covered with dry, sterile dressing. Patient tolerated procedure well without complications. Instructions for care discussed verbally  and patient provided with additional written instructions for homecare and f/u.      COURSE & MEDICAL DECISION MAKING  Pertinent Labs & Imaging studies reviewed. (See chart for details)    8:50 PM  Perform laceration repair which patient tolerated well    10:00 PM  Patient does have a worsening thrombocytopenia normally she is at least over 100 today her white count is 70 this does not require transfusion and she is not anemic and her white count is normal, this could be thrombocytopenia of chronic disease she is not currently on aspirin plan is to follow-up with her primary care provider.  Her daughter understands feels comfortable taking her home      I verified that the patient was wearing a mask and I was wearing appropriate PPE every time I entered the room. The patient's mask was on the patient at all times during my encounter except for a brief view of the oropharynx.    The patient will return for new or worsening symptoms and is stable at the time of discharge.    The patient is referred to a primary physician for blood pressure management, diabetic screening, and for all other preventative health concerns.    DISPOSITION:  Patient will be discharged home in stable condition.    FOLLOW UP:  Watson Chaudhari M.D.  [facility] [address] [address]  [phone]    Call       [facility], Emergency Dept  [address] [facility] [address] [phone]    If symptoms worsen      OUTPATIENT MEDICATIONS:  New Prescriptions    No medications on file         FINAL IMPRESSION  1. Arm laceration, left, subsequent encounter     2. Thrombocytopenia (HCC)     3. Hematoma                Electronically signed by: Ирина Chawla M.D., 5/3/2021 7:35 PM    This dictation has been created using voice recognition software and/or scribes. The accuracy of the dictation is limited by the abilities of the software and the expertise of the scribes. I expect there may be some errors of  grammar and possibly content. I made every attempt to manually correct the errors within my dictation. However, errors related to voice recognition software and/or scribes may still exist and should be interpreted within the appropriate context.

## 2021-05-05 ENCOUNTER — PATIENT OUTREACH (OUTPATIENT)
Dept: HEALTH INFORMATION MANAGEMENT | Facility: OTHER | Age: 86
End: 2021-05-05

## 2021-05-07 ENCOUNTER — HOSPITAL ENCOUNTER (EMERGENCY)
Facility: MEDICAL CENTER | Age: 86
End: 2021-05-07
Attending: EMERGENCY MEDICINE
Payer: MEDICARE

## 2021-05-07 VITALS
HEIGHT: 62 IN | BODY MASS INDEX: 15.7 KG/M2 | OXYGEN SATURATION: 95 % | DIASTOLIC BLOOD PRESSURE: 78 MMHG | RESPIRATION RATE: 17 BRPM | HEART RATE: 69 BPM | WEIGHT: 85.32 LBS | SYSTOLIC BLOOD PRESSURE: 118 MMHG | TEMPERATURE: 97.6 F

## 2021-05-07 DIAGNOSIS — T14.8XXA BLEEDING FROM WOUND: ICD-10-CM

## 2021-05-07 DIAGNOSIS — D69.6 THROMBOCYTOPENIA (HCC): ICD-10-CM

## 2021-05-07 LAB
BASOPHILS # BLD AUTO: 1.1 % (ref 0–1.8)
BASOPHILS # BLD: 0.06 K/UL (ref 0–0.12)
EOSINOPHIL # BLD AUTO: 0.02 K/UL (ref 0–0.51)
EOSINOPHIL NFR BLD: 0.4 % (ref 0–6.9)
ERYTHROCYTE [DISTWIDTH] IN BLOOD BY AUTOMATED COUNT: 53.9 FL (ref 35.9–50)
HCT VFR BLD AUTO: 34.1 % (ref 37–47)
HGB BLD-MCNC: 11 G/DL (ref 12–16)
IMM GRANULOCYTES # BLD AUTO: 0.02 K/UL (ref 0–0.11)
IMM GRANULOCYTES NFR BLD AUTO: 0.4 % (ref 0–0.9)
LYMPHOCYTES # BLD AUTO: 0.81 K/UL (ref 1–4.8)
LYMPHOCYTES NFR BLD: 14.9 % (ref 22–41)
MCH RBC QN AUTO: 31.7 PG (ref 27–33)
MCHC RBC AUTO-ENTMCNC: 32.3 G/DL (ref 33.6–35)
MCV RBC AUTO: 98.3 FL (ref 81.4–97.8)
MONOCYTES # BLD AUTO: 0.5 K/UL (ref 0–0.85)
MONOCYTES NFR BLD AUTO: 9.2 % (ref 0–13.4)
NEUTROPHILS # BLD AUTO: 4.03 K/UL (ref 2–7.15)
NEUTROPHILS NFR BLD: 74 % (ref 44–72)
NRBC # BLD AUTO: 0 K/UL
NRBC BLD-RTO: 0 /100 WBC
PLATELET # BLD AUTO: 88 K/UL (ref 164–446)
PMV BLD AUTO: 11.1 FL (ref 9–12.9)
RBC # BLD AUTO: 3.47 M/UL (ref 4.2–5.4)
WBC # BLD AUTO: 5.4 K/UL (ref 4.8–10.8)

## 2021-05-07 PROCEDURE — 700101 HCHG RX REV CODE 250: Performed by: EMERGENCY MEDICINE

## 2021-05-07 PROCEDURE — 303747 HCHG EXTRA SUTURE

## 2021-05-07 PROCEDURE — 304999 HCHG REPAIR-SIMPLE/INTERMED LEVEL 1

## 2021-05-07 PROCEDURE — 99284 EMERGENCY DEPT VISIT MOD MDM: CPT

## 2021-05-07 PROCEDURE — 85025 COMPLETE CBC W/AUTO DIFF WBC: CPT

## 2021-05-07 RX ORDER — LIDOCAINE HYDROCHLORIDE AND EPINEPHRINE 10; 10 MG/ML; UG/ML
20 INJECTION, SOLUTION INFILTRATION; PERINEURAL ONCE
Status: COMPLETED | OUTPATIENT
Start: 2021-05-07 | End: 2021-05-07

## 2021-05-07 RX ADMIN — LIDOCAINE HYDROCHLORIDE,EPINEPHRINE BITARTRATE 20 ML: 10; .01 INJECTION, SOLUTION INFILTRATION; PERINEURAL at 11:05

## 2021-05-07 ASSESSMENT — ENCOUNTER SYMPTOMS
FALLS: 0
BRUISES/BLEEDS EASILY: 1
FEVER: 0

## 2021-05-07 ASSESSMENT — FIBROSIS 4 INDEX: FIB4 SCORE: 5.78

## 2021-05-07 NOTE — ED NOTES
Pt A&Ox4, given discharge instructions. Discussed follow-up, diet, activity, symptoms and management. Pt educated on returning to ED for any worsening of symptoms. Pt verbalized understanding of all discharge instructions. All questions answered. IV d/c'd. Pt out of ER with daughter, all belongings accounted for.

## 2021-05-07 NOTE — DISCHARGE INSTRUCTIONS
Let the Steri-Strips fall out on their own.  The sutures can be removed in the next 5 to 7 days.  You can clean the wound by gently patting with water or gently letting water pour over the wound patting it dry and redressing it with a nonadherent dressing.

## 2021-05-07 NOTE — ED PROVIDER NOTES
ED Provider Note    ED Provider Note    Primary care provider: Watson Chaudhari M.D.  Means of arrival: POV  History obtained from: patient, daughter  History limited by: None    CHIEF COMPLAINT  Chief Complaint   Patient presents with   • Wound Check     pt was here 2x on 5/3 for bleeding wound. pt back for L forearm that has not stopped bleeding since monday. blood is seeping through bandage. gauze reapplied       HPI  Mary Murrieta is a 88 y.o. female who presents to the Emergency Department with her daughter with a chief complaint of again continued bleeding from a left forearm laceration.  Patient is not anticoagulated and has not been for months.  There have been multiple attempts at repair to stop the bleeding over the last few days.  Daughter has tried to troubleshoot it on her own at home but she woke up this morning with a soaked breath dressing and this prompted their ED visit here today.  She was also somewhat concerned, that the patient has a history of thrombocytopenia.  No new injuries.    REVIEW OF SYSTEMS  Review of Systems   Constitutional: Negative for fever.   Musculoskeletal: Negative for falls.   Endo/Heme/Allergies: Bruises/bleeds easily.       PAST MEDICAL HISTORY   has a past medical history of Aortic aneurysm without rupture ([facility]), Atherosclerosis of aorta ([facility]) (3/27/2019), Cataract, Compression fracture of fourth lumbar vertebra ([facility]) (3/28/2018), Dental disorder (03/20/2018), Emphysema of lung ([facility]), Gastroesophageal reflux disease without esophagitis (5/11/2018), Hiatus hernia syndrome, Hypothyroidism (2/8/2016), Mixed hyperlipidemia (12/7/2016), and Protein-calorie malnutrition (HCC) (3/28/2018).    SURGICAL HISTORY   has a past surgical history that includes tonsillectomy (1938); thyroidectomy; and abdominal hysterectomy total (1986).    SOCIAL HISTORY  Social History     Tobacco Use   • Smoking status: Current Every Day Smoker     Packs/day: 1.00     Years: 50.00     Pack years:  "50.00     Types: Cigarettes   • Smokeless tobacco: Never Used   Substance Use Topics   • Alcohol use: Not Currently     Alcohol/week: 0.6 oz     Types: 1 Glasses of wine per week     Comment: 3 per week   • Drug use: No      Social History     Substance and Sexual Activity   Drug Use No       FAMILY HISTORY  Family History   Problem Relation Age of Onset   • Cancer Mother    • Cancer Father         esophageal   • Stroke Father    • Lung Disease Sister         emphysema       CURRENT MEDICATIONS  Home Medications     Reviewed by Angella Wall R.N. (Registered Nurse) on 05/07/21 at 0858  Med List Status: Partial   Medication Last Dose Status   albuterol 108 (90 Base) MCG/ACT Aero Soln inhalation aerosol  Active   alendronate (FOSAMAX) 70 MG Tab  Active   apixaban (ELIQUIS) 5mg Tab  Active   Fluticasone-Umeclidin-Vilant 100-62.5-25 MCG/INH AEROSOL POWDER, BREATH ACTIVATED  Active   levothyroxine (SYNTHROID) 112 MCG Tab  Active   levothyroxine (SYNTHROID) 125 MCG Tab  Active   lisinopril (PRINIVIL) 10 MG Tab  Active   Magnesium 400 MG Cap  Active   magnesium hydroxide (MILK OF MAGNESIA) 400 MG/5ML Suspension  Active   metoprolol SR (TOPROL XL) 50 MG TABLET SR 24 HR  Active   Nutritional Supplements (VITAMIN D BOOSTER PO)  Active   pantoprazole (PROTONIX) 40 MG Tablet Delayed Response  Active   rosuvastatin (CRESTOR) 40 MG tablet  Active   sennosides-docusate sodium (SENOKOT-S) 8.6-50 MG tablet  Active                ALLERGIES  No Known Allergies    PHYSICAL EXAM  VITAL SIGNS: /78   Pulse 69   Temp 36.4 °C (97.6 °F) (Temporal)   Resp 17   Ht 1.575 m (5' 2\")   Wt 38.7 kg (85 lb 5.1 oz)   LMP 03/03/1970 (Approximate)   SpO2 95%   BMI 15.60 kg/m²   Vitals reviewed.  Constitutional: Patient is oriented to person, place, and time. Appears well-developed and well-nourished. No distress.    Head: Normocephalic and atraumatic.   Eyes: Conjunctivae are normal.   Neck: Normal range of motion.   Cardiovascular: " Normal rate, regular rhythm and normal heart sounds. Normal peripheral pulses, bilateral UE.  Pulmonary/Chest: Effort normal and breath sounds normal. No respiratory distress  Musculoskeletal: No edema and no tenderness.   Neurological: No focal deficits.   Skin: Skin is warm and dry. No erythema. No pallor.  There is bleeding from a left forearm laceration, dorsum.  Thin skin.  Psychiatric: Patient has a normal mood and affect.     LABS  Results for orders placed or performed during the hospital encounter of 05/07/21   CBC WITH DIFFERENTIAL   Result Value Ref Range    WBC 5.4 4.8 - 10.8 K/uL    RBC 3.47 (L) 4.20 - 5.40 M/uL    Hemoglobin 11.0 (L) 12.0 - 16.0 g/dL    Hematocrit 34.1 (L) 37.0 - 47.0 %    MCV 98.3 (H) 81.4 - 97.8 fL    MCH 31.7 27.0 - 33.0 pg    MCHC 32.3 (L) 33.6 - 35.0 g/dL    RDW 53.9 (H) 35.9 - 50.0 fL    Platelet Count 88 (L) 164 - 446 K/uL    MPV 11.1 9.0 - 12.9 fL    Neutrophils-Polys 74.00 (H) 44.00 - 72.00 %    Lymphocytes 14.90 (L) 22.00 - 41.00 %    Monocytes 9.20 0.00 - 13.40 %    Eosinophils 0.40 0.00 - 6.90 %    Basophils 1.10 0.00 - 1.80 %    Immature Granulocytes 0.40 0.00 - 0.90 %    Nucleated RBC 0.00 /100 WBC    Neutrophils (Absolute) 4.03 2.00 - 7.15 K/uL    Lymphs (Absolute) 0.81 (L) 1.00 - 4.80 K/uL    Monos (Absolute) 0.50 0.00 - 0.85 K/uL    Eos (Absolute) 0.02 0.00 - 0.51 K/uL    Baso (Absolute) 0.06 0.00 - 0.12 K/uL    Immature Granulocytes (abs) 0.02 0.00 - 0.11 K/uL    NRBC (Absolute) 0.00 K/uL       All labs reviewed by me.    COURSE & MEDICAL DECISION MAKING  Pertinent Labs & Imaging studies reviewed. (See chart for details)    Obtained and reviewed past medical records.  Patient was seen in the emergency department for wound check on May 3 x 2.  There was an original note, the patient was on Eliquis but family reports today, that she has not taken Eliquis for months.    10:11 AM - Patient seen and examined at bedside.  This is a pleasant and overall well-appearing  88-year-old female who presents with her daughter with a chief complaint of a laceration to her left forearm.  She incurred this laceration several days ago but has not had success in getting the bleeding controlled.  Will plan for platelet evaluation.  And wound closure.    After wound closure, the wound was left undressed so it could continue to be observed for bleeding.  After about an hour, the wound appears to have achieved hemostasis.  I have advised the patient and her daughter, that when the sutures are removed, to leave the Steri-Strips in place.  The Steri-Strips are not closing the wound, they are simply reinforcing the skin on the sides of the wound could be closed with suture.  The Steri-Strips can fall off on their own.  We also discussed cleaning the wound daily.  I have advised follow-up with their primary care doctor in the next 2 to 3 days.  Platelet count below, is improved from previous values at 88, previously 70.  Patient is discharged to home in stable condition.    Laceration Repair Procedure    Indication: Laceration    Location/Description: left forearm, dorsum    Procedure: The patient was placed in the appropriate position and anesthesia around the laceration was obtained by infiltration using 1% Lidocaine with epinephrine. The area was then cleansed. The laceration was 5-0 nylon over Steri-Strips applied to the wound edges to reinforce the skin. There were no additional lacerations requiring repair. The wound area was then dressed with a sterile dressing and vaseline soaked gauze.      Total repaired wound length: 2 cm.     Other Items: Staple count: 5    The patient tolerated the procedure well.    Complications: None      FINAL IMPRESSION  1. Bleeding from wound    2. Thrombocytopenia (HCC)

## 2021-05-07 NOTE — ED TRIAGE NOTES
"Chief Complaint   Patient presents with   • Wound Check     pt was here 2x on 5/3 for bleeding wound. pt back for L forearm that has not stopped bleeding since monday. blood is seeping through bandage. gauze reapplied       Pt BIB daughter for above complaint. Pt denies feeling dizzy, but states feeling \"weak\". Daughter said she put powder on the wound yesterday to stop the wound from bleeding. When pt woke up this morning L arm was bleeding again. Pt has not taken eloquis since October. Educated pt and daughter on triage process and to notify if there is any change        /78   Pulse 81   Temp 36.5 °C (97.7 °F) (Temporal)   Resp 16   Ht 1.575 m (5' 2\")   Wt 38.7 kg (85 lb 5.1 oz)   LMP 03/03/1970 (Approximate)   SpO2 95%   BMI 15.60 kg/m²     "

## 2021-05-07 NOTE — ED NOTES
Pt from the lobby to B15, agree with triage note. Bleeding controlled at this time with gauze and coban. Agree with triage note. Chart up for ERP

## 2021-05-11 ENCOUNTER — OFFICE VISIT (OUTPATIENT)
Dept: MEDICAL GROUP | Facility: PHYSICIAN GROUP | Age: 86
End: 2021-05-11
Payer: MEDICARE

## 2021-05-11 VITALS
SYSTOLIC BLOOD PRESSURE: 124 MMHG | BODY MASS INDEX: 16.43 KG/M2 | HEART RATE: 78 BPM | WEIGHT: 89.3 LBS | RESPIRATION RATE: 16 BRPM | OXYGEN SATURATION: 93 % | TEMPERATURE: 97.6 F | HEIGHT: 62 IN | DIASTOLIC BLOOD PRESSURE: 66 MMHG

## 2021-05-11 DIAGNOSIS — S41.119A SKIN TEAR OF UPPER EXTREMITY: ICD-10-CM

## 2021-05-11 DIAGNOSIS — J44.9 CHRONIC OBSTRUCTIVE PULMONARY DISEASE, UNSPECIFIED COPD TYPE (HCC): ICD-10-CM

## 2021-05-11 DIAGNOSIS — Z13.29 SCREENING FOR THYROID DISORDER: ICD-10-CM

## 2021-05-11 DIAGNOSIS — D69.9 BLEEDING DIATHESIS (HCC): ICD-10-CM

## 2021-05-11 DIAGNOSIS — R23.3 SPONTANEOUS HEMATOMA OF LOWER LEG: ICD-10-CM

## 2021-05-11 DIAGNOSIS — D69.6 THROMBOCYTOPENIA (HCC): ICD-10-CM

## 2021-05-11 PROCEDURE — 99215 OFFICE O/P EST HI 40 MIN: CPT | Performed by: NURSE PRACTITIONER

## 2021-05-11 RX ORDER — ALBUTEROL SULFATE 90 UG/1
2 AEROSOL, METERED RESPIRATORY (INHALATION) EVERY 6 HOURS PRN
Qty: 1 EACH | Refills: 5 | Status: SHIPPED | OUTPATIENT
Start: 2021-05-11 | End: 2021-11-03 | Stop reason: SDUPTHER

## 2021-05-11 ASSESSMENT — FIBROSIS 4 INDEX: FIB4 SCORE: 4.6

## 2021-05-11 NOTE — PROGRESS NOTES
"      CC: hospital follow up                                                                                                                                      HPI:   Mary presents today with the following.    Spontaneous hematoma of lower leg  Pt woke up in the middle of the night and felt a \"pop\" on her right shin. This happened may 2nd. She did not see any bleeding at the time. She woke up the next morning with a hematoma on her right shin. She developed a skin tear the next day that would not stop bleeding. She had several ER visits due to the bleeding. Per her daughter, she had filled a towel with blood. Eventually, the ER doctor ended up using sutures with steri- strips. Pt denies any falls, trauma, headaches, palpitations, epitaxis. Pt reports increased fatigue.     Bleeding diathesis (HCC)  This is an acute issue.  Patient developed spontaneous hematoma on her left lower extremity approximately week ago.  A day after that she obtained a skin tear on her left forearm which would not stop bleeding.  Patient did have multiple ER visits due to this.  At 1 point patient's daughter reported that patient had filled up an entire kitchen towel with blood due to the bleeding of her skin tear.  At her last visit to the ER the ER physician did close the skin tear with Steri-Strips, Dermabond, and sutures to hold it altogether.  Since then, the bleeding has stopped.       Current Outpatient Medications   Medication Sig Dispense Refill   • albuterol 108 (90 Base) MCG/ACT Aero Soln inhalation aerosol Inhale 2 Puffs every 6 hours as needed for Shortness of Breath. 1 Each 5   • metoprolol SR (TOPROL XL) 50 MG TABLET SR 24 HR Take 1 tablet by mouth every day. 100 tablet 1   • lisinopril (PRINIVIL) 10 MG Tab Take 1 tablet by mouth every day. 100 tablet 1   • levothyroxine (SYNTHROID) 112 MCG Tab      • rosuvastatin (CRESTOR) 40 MG tablet Take 1 tablet by mouth every day for 360 days. 100 tablet 2   • alendronate (FOSAMAX) 70 " "MG Tab Take 1 Tab by mouth every 7 days. 12 Tab 1   • magnesium hydroxide (MILK OF MAGNESIA) 400 MG/5ML Suspension Take 30 mL by mouth 1 time daily as needed. 480 mL 2   • levothyroxine (SYNTHROID) 125 MCG Tab Take 1 Tab by mouth Every morning on an empty stomach. 60 Tab 2   • sennosides-docusate sodium (SENOKOT-S) 8.6-50 MG tablet Take 2 Tabs by mouth 1 time daily as needed. 60 Tab 2   • Magnesium 400 MG Cap Take 1 Each by mouth every day. 90 Cap 1   • Fluticasone-Umeclidin-Vilant 100-62.5-25 MCG/INH AEROSOL POWDER, BREATH ACTIVATED Inhale 1 Puff by mouth every day. 3 Each 3   • Nutritional Supplements (VITAMIN D BOOSTER PO) Take 1 Tab by mouth every day.       No current facility-administered medications for this visit.       Allergies as of 05/11/2021   • (No Known Allergies)        ROS:  Gen: no fevers/chills, no changes in weight  Pulm: no sob, no cough  CV: no chest pain, no palpitations  GI: no nausea/vomiting, no diarrhea  Skin: + ecchymosis  Neuro: no headaches, no numbness/tingling      /66 (BP Location: Left arm, Patient Position: Sitting, BP Cuff Size: Adult)   Pulse 78   Temp 36.4 °C (97.6 °F) (Temporal)   Resp 16   Ht 1.575 m (5' 2\")   Wt 40.5 kg (89 lb 4.8 oz)   LMP 03/03/1970 (Approximate)   SpO2 93%   BMI 16.33 kg/m²     Physical Exam:  Gen:         Alert and oriented, No apparent distress.  Neck:        No Lymphadenopathy.   Lungs:     Clear to auscultation bilaterally. No wheezes, rales, or rhonchi.   CV:          Regular rate and rhythm. No murmurs, rubs or gallops.     Abd: Soft, non tender. No masses palpated. No hepatomegaly or splenomegaly.       Ext:          No clubbing, cyanosis, or peripheral edema.  Skin:  Pt has generalized ecchymosis on bilateral upper and lower ext. She has hematoma, approx. 2.5cm x 1.5cm, on her right shin. She has a healing skin tear on her left forarm.       Assessment and Plan:  88 y.o. female with the following issues.    1. Spontaneous hematoma of " lower leg  - CBC WITH DIFFERENTIAL; Future  - Prothrombin Time; Future  - APTT; Future  - Comp Metabolic Panel; Future  - VITAMIN B12; Future  - FOLATE; Future  - IRON; Future  - HIV AG/AB COMBO ASSAY SCREENING; Future  - HEPATITIS PANEL ACUTE(4 COMPONENTS); Future  - EBV ACUTE INFECTION AB PANEL; Future  - CMV ABS IGM QN; Future  - TSH; Future  - FREE THYROXINE; Future  - TRIIDOTHYRONINE; Future  - US-ABDOMEN COMPLETE SURVEY; Future  - US-EXTREMITY VENOUS LOWER BILAT; Future    2. Skin tear of upper extremity    3. Thrombocytopenia (HCC)  - CBC WITH DIFFERENTIAL; Future  - Prothrombin Time; Future  - APTT; Future  - Comp Metabolic Panel; Future  - VITAMIN B12; Future  - FOLATE; Future  - IRON; Future  - HIV AG/AB COMBO ASSAY SCREENING; Future  - HEPATITIS PANEL ACUTE(4 COMPONENTS); Future  - EBV ACUTE INFECTION AB PANEL; Future  - CMV ABS IGM QN; Future  - TSH; Future  - FREE THYROXINE; Future  - TRIIDOTHYRONINE; Future  - US-ABDOMEN COMPLETE SURVEY; Future    4. Bleeding diathesis (HCC)  - CBC WITH DIFFERENTIAL; Future  - Prothrombin Time; Future  - APTT; Future  - Comp Metabolic Panel; Future  - VITAMIN B12; Future  - FOLATE; Future  - IRON; Future  - HIV AG/AB COMBO ASSAY SCREENING; Future  - HEPATITIS PANEL ACUTE(4 COMPONENTS); Future  - EBV ACUTE INFECTION AB PANEL; Future  - CMV ABS IGM QN; Future  - TSH; Future  - FREE THYROXINE; Future  - TRIIDOTHYRONINE; Future  - US-ABDOMEN COMPLETE SURVEY; Future  - US-EXTREMITY VENOUS LOWER BILAT; Future    This is an acute issue.  I did confirm the patient is no longer taking Eliquis.  So, this is not the cause of her thrombocytopenia and acute bleeding.  Will obtain work-up to evaluate for cause of thrombocytopenia as well as spontaneous hematoma of her right lower extremity.  Patient to follow-up in 2 weeks to go over labs and imaging to determine if further work-up is needed.  Patient provided with strict ER precautions.    5. Screening for thyroid disorder  - TSH;  Future  - FREE THYROXINE; Future  - TRIIDOTHYRONINE; Future    6. Chronic obstructive pulmonary disease, unspecified COPD type (HCC)  - albuterol 108 (90 Base) MCG/ACT Aero Soln inhalation aerosol; Inhale 2 Puffs every 6 hours as needed for Shortness of Breath.  Dispense: 1 Each; Refill: 5          Return in about 2 weeks (around 5/25/2021) for follow  up on labs.      I spent a total of 54 minutes with record review, exam, and communication with the patient, communication with other providers, and documentation of this encounter. This does not include time spent on separately billable procedures/tests.      I have placed the below orders and discussed them with an approved delegating provider.  The MA is performing the below orders under the direction of Dr. Marte.    Please note that this dictation was created using voice recognition software. I have worked with consultants from the vendor as well as technical experts from [facility] to optimize the interface. I have made every reasonable attempt to correct obvious errors, but I expect that there are errors of grammar and possibly content that I did not discover before finalizing the note.

## 2021-05-11 NOTE — ASSESSMENT & PLAN NOTE
"Pt woke up in the middle of the night and felt a \"pop\" on her right shin. This happened may 2nd. She did not see any bleeding at the time. She woke up the next morning with a hematoma on her right shin. She developed a skin tear the next day that would not stop bleeding. She had several ER visits due to the bleeding. Per her daughter, she had filled a towel with blood. Eventually, the ER doctor ended up using sutures with steri- strips. Pt denies any falls, trauma, headaches, palpitations, epitaxis. Pt reports increased fatigue.   "

## 2021-05-12 NOTE — ASSESSMENT & PLAN NOTE
This is an acute issue.  Patient developed spontaneous hematoma on her left lower extremity approximately week ago.  A day after that she obtained a skin tear on her left forearm which would not stop bleeding.  Patient did have multiple ER visits due to this.  At 1 point patient's daughter reported that patient had filled up an entire kitchen towel with blood due to the bleeding of her skin tear.  At her last visit to the ER the ER physician did close the skin tear with Steri-Strips, Dermabond, and sutures to hold it altogether.  Since then, the bleeding has stopped.

## 2021-05-18 ENCOUNTER — OFFICE VISIT (OUTPATIENT)
Dept: MEDICAL GROUP | Facility: PHYSICIAN GROUP | Age: 86
End: 2021-05-18
Payer: MEDICARE

## 2021-05-18 ENCOUNTER — TELEPHONE (OUTPATIENT)
Dept: MEDICAL GROUP | Facility: PHYSICIAN GROUP | Age: 86
End: 2021-05-18

## 2021-05-18 VITALS
DIASTOLIC BLOOD PRESSURE: 58 MMHG | RESPIRATION RATE: 16 BRPM | BODY MASS INDEX: 16.38 KG/M2 | OXYGEN SATURATION: 97 % | SYSTOLIC BLOOD PRESSURE: 102 MMHG | HEIGHT: 62 IN | WEIGHT: 89 LBS | HEART RATE: 67 BPM

## 2021-05-18 DIAGNOSIS — I10 ESSENTIAL HYPERTENSION: ICD-10-CM

## 2021-05-18 DIAGNOSIS — J44.9 CHRONIC OBSTRUCTIVE PULMONARY DISEASE, UNSPECIFIED COPD TYPE (HCC): ICD-10-CM

## 2021-05-18 DIAGNOSIS — I48.92 ATRIAL FIBRILLATION AND FLUTTER (HCC): ICD-10-CM

## 2021-05-18 DIAGNOSIS — I71.9 AORTIC ANEURYSM WITHOUT RUPTURE, UNSPECIFIED PORTION OF AORTA (HCC): ICD-10-CM

## 2021-05-18 DIAGNOSIS — D69.9 BLEEDING DIATHESIS (HCC): ICD-10-CM

## 2021-05-18 DIAGNOSIS — Z71.89 ADVANCED CARE PLANNING/COUNSELING DISCUSSION: ICD-10-CM

## 2021-05-18 DIAGNOSIS — E78.2 MIXED HYPERLIPIDEMIA: ICD-10-CM

## 2021-05-18 DIAGNOSIS — E03.9 HYPOTHYROIDISM, UNSPECIFIED TYPE: ICD-10-CM

## 2021-05-18 DIAGNOSIS — F17.200 CURRENT SMOKER: ICD-10-CM

## 2021-05-18 DIAGNOSIS — D69.6 THROMBOCYTOPENIA (HCC): Primary | ICD-10-CM

## 2021-05-18 DIAGNOSIS — I77.810 AORTIC ECTASIA, THORACIC (HCC): ICD-10-CM

## 2021-05-18 DIAGNOSIS — N18.31 STAGE 3A CHRONIC KIDNEY DISEASE: ICD-10-CM

## 2021-05-18 DIAGNOSIS — E44.0 MODERATE PROTEIN-CALORIE MALNUTRITION (HCC): ICD-10-CM

## 2021-05-18 DIAGNOSIS — I48.91 ATRIAL FIBRILLATION AND FLUTTER (HCC): ICD-10-CM

## 2021-05-18 PROBLEM — R09.82 POSTNASAL DRIP: Status: RESOLVED | Noted: 2019-11-21 | Resolved: 2021-05-18

## 2021-05-18 PROBLEM — R27.0 ATAXIA: Status: RESOLVED | Noted: 2019-03-03 | Resolved: 2021-05-18

## 2021-05-18 PROBLEM — R79.89 ELEVATED TROPONIN: Status: RESOLVED | Noted: 2020-10-15 | Resolved: 2021-05-18

## 2021-05-18 PROCEDURE — 99215 OFFICE O/P EST HI 40 MIN: CPT | Performed by: NURSE PRACTITIONER

## 2021-05-18 ASSESSMENT — FIBROSIS 4 INDEX: FIB4 SCORE: 4.6

## 2021-05-18 NOTE — ASSESSMENT & PLAN NOTE
Patient presented to the ED twice, May 3 and May 7th with difficulty controlling bleeding from two sites.   She then presented to her PCP for follow up, and a full workup of laboratory studies were ordered at that time. Patient has not had time to get these completed, but her daughter assures me that they will get these completed likely tomorrow.  They will see me back in two weeks to go over the labs and determine further treatment plan.

## 2021-05-18 NOTE — ASSESSMENT & PLAN NOTE
Patient and her daughter are interested in advanced care planning.  Referral to social work placed for help with advanced care planning.

## 2021-05-18 NOTE — ASSESSMENT & PLAN NOTE
Chronic condition.  Patient reports that she has been tested for need for home O2 and does desaturate to the required levels.  She reports she cannot walk to her front door or mailbox without becoming severely short of breath. She was using Trelegy inhaler, but could not get a refill, so has been using her albuterol inhaler quite frequently.  She does feel that the Trelegy was helpful to her.

## 2021-05-18 NOTE — ASSESSMENT & PLAN NOTE
Chronic condition.  Per patient and patient's daughter, patient is not a good eater.  She tries to eat but often does not as she is not hungry.  Suggested supplementation with Ensure shakes.

## 2021-05-18 NOTE — ASSESSMENT & PLAN NOTE
Chronic condition, stable.  GFR 56 from 2019.  She is due for repeat labs.  We will continue to watch this.

## 2021-05-18 NOTE — ASSESSMENT & PLAN NOTE
Chronic condition, stable.  Patient is followed by vascular.   Per patient's daughter, patient is not surgical candidate.  She was on eliquis, however, she stopped in November 2020.  She is not felt to be a good candidate at this time due to age, fall risk, recurrent superficial bleeding which is difficult to control, and thrombocytopenia.  She is on rosuvastatin 40 mg daily and her blood pressure is under good control.  They will get a BP cuff to monitor patients BP daily.  Continue current regimen.

## 2021-05-18 NOTE — ASSESSMENT & PLAN NOTE
Chronic condition, stable.  Patient is taking rosuvastatin 40 mg daily.  She tolerates this without side-effects of myalgias/arthralgias.  She is due for updated labs.   Continue current regimen.

## 2021-05-18 NOTE — TELEPHONE ENCOUNTER
ESTABLISHED PATIENT PRE-VISIT PLANNING     Patient was NOT contacted to complete PVP.     Note: Patient will not be contacted if there is no indication to call.   NO answer, LVM  1.  Reviewed notes from the last few office visits within the medical group: Yes    2.  If any orders were placed at last visit or intended to be done for this visit (i.e. 6 mos follow-up), do we have Results/Consult Notes?         •  Labs - Labs ordered, NOT completed. Patient advised to complete prior to next appointment.  Note: If patient appointment is for lab review and patient did not complete labs, check with provider if OK to reschedule patient until labs completed.       •  Imaging - Pt is currently scheduled for imaging exams       •  Referrals - No referrals were ordered at last office visit.    3. Is this appointment scheduled as a Hospital Follow-Up? No    4.  Immunizations were updated in Epic using Reconcile Outside Information activity? Yes, nothing to reconcile     5.  Patient is due for the following Health Maintenance Topics:   Health Maintenance Due   Topic Date Due   • IMM ZOSTER VACCINES (1 of 2) Never done   • Annual Wellness Visit  05/12/2019   • Annual Pulmonary Function Test / Spirometry  05/16/2020       6.  AHA (Pulse8) form printed for Provider? Yes

## 2021-05-18 NOTE — ASSESSMENT & PLAN NOTE
Patient was diagnosed with new onset atrial fibrillation which was noted on her EKG during her hospitalization in October of 2021.  She did convert back to sinus during her hospital stay, but was ultimately discharged on Eliquis 2.5 mg.  On exam today, she does not appear to be in atrial fibrillation, as her pulse feels normal and regular today.  She is on metoprolol 50 mg daily and her heart rate is controlled.  After workup for thrombocytopenia is completed, will have further discussion with the patient and her daughter about anticoagulation.  She is not felt to be a good candidate at this time due to age, fall risk, recurrent superficial bleeding which is difficult to control, and thrombocytopenia.  Both the patient and her daughter verbalize understanding of that at this time.

## 2021-05-18 NOTE — PROGRESS NOTES
Subjective:     CC: Thrombocytopenia.    HPI:   Mary presents today with her daughter to establish care with me.  They have just recently been seen by [facility] for patient's annual visit, and wanted to change PCP from prior Dr. Chaudhari.  Patient's extensive past medical, social, family and surgical history were reviewed in detail today.      Past Medical History:   Diagnosis Date   • Aortic aneurysm without rupture (HCC)    • Atherosclerosis of aorta (HCC) 3/27/2019   • Cataract     +surgery bilat   • Compression fracture of fourth lumbar vertebra (HCC) 3/28/2018   • Dental disorder 03/20/2018    upper and lower   • Emphysema of lung (HCC)    • Gastroesophageal reflux disease without esophagitis 5/11/2018   • Hiatus hernia syndrome    • Hypothyroidism 2/8/2016   • Mixed hyperlipidemia 12/7/2016   • Protein-calorie malnutrition (HCC) 3/28/2018       Social History     Tobacco Use   • Smoking status: Current Every Day Smoker     Packs/day: 1.00     Years: 50.00     Pack years: 50.00     Types: Cigarettes   • Smokeless tobacco: Never Used   Vaping Use   • Vaping Use: Never used   Substance Use Topics   • Alcohol use: Not Currently     Alcohol/week: 0.6 oz     Types: 1 Glasses of wine per week     Comment: 3 per week   • Drug use: No       Current Outpatient Medications Ordered in Epic   Medication Sig Dispense Refill   • Fluticasone-Umeclidin-Vilant 100-62.5-25 MCG/INH AEROSOL POWDER, BREATH ACTIVATED Inhale 1 Puff every day. 3 Each 3   • albuterol 108 (90 Base) MCG/ACT Aero Soln inhalation aerosol Inhale 2 Puffs every 6 hours as needed for Shortness of Breath. 1 Each 5   • metoprolol SR (TOPROL XL) 50 MG TABLET SR 24 HR Take 1 tablet by mouth every day. 100 tablet 1   • lisinopril (PRINIVIL) 10 MG Tab Take 1 tablet by mouth every day. 100 tablet 1   • levothyroxine (SYNTHROID) 112 MCG Tab      • alendronate (FOSAMAX) 70 MG Tab Take 1 Tab by mouth every 7 days. 12 Tab 1   • Nutritional Supplements  "(VITAMIN D BOOSTER PO) Take 1 Tab by mouth every day.     • rosuvastatin (CRESTOR) 40 MG tablet Take 1 tablet by mouth every day for 360 days. (Patient not taking: Reported on 5/18/2021) 100 tablet 2     No current Epic-ordered facility-administered medications on file.       Allergies:  Patient has no known allergies.    Health Maintenance: Completed    ROS:  Gen: no fevers/chills, no changes in weight  Eyes: no changes in vision  ENT: no sore throat, no hearing loss, no bloody nose  Pulm: no sob, no cough  CV: no chest pain, no palpitations  GI: no nausea/vomiting, no diarrhea  : no dysuria  MSk: no myalgias  Skin: no rash  Neuro: no headaches, no numbness/tingling  Heme/Lymph: no easy bruising      Objective:       Exam:  /58 (BP Location: Left arm, Patient Position: Sitting, BP Cuff Size: Adult)   Pulse 67   Resp 16   Ht 1.575 m (5' 2\")   Wt 40.4 kg (89 lb)   LMP 03/03/1970 (Approximate)   SpO2 97%   BMI 16.28 kg/m²  Body mass index is 16.28 kg/m².    Gen: Alert and oriented, No apparent distress. Frail appearing.  Hard of hearing but wears hearing aids.   Neck: Neck is supple without lymphadenopathy.  No carotid bruits.   Lungs: Normal effort, CTA bilaterally, no wheezes, rhonchi, or rales  CV: Regular rate and rhythm. No murmurs, rubs, or gallops.  Distant heart sounds.   Ext: No clubbing, cyanosis, edema.  Thin, fragile skin with healed right forearm site and healing shin wound with scabbing and one small area of bleeding.  Both areas are from previous skin tears.  No signs of infection noted.     Labs: Reviewed from recent ED visits.     Assessment & Plan:     88 y.o. female with the following -     1. Thrombocytopenia (HCC)  Patient was noted to have thrombocytopenia at her most recently hospitalization in October 2020. More recently, she had two episodes of bleeding that was difficult to control and was seen in the ER.  On May 3, 2021 her platelets were 70 and then 88 on May 7, 2021.  Her " Hemoglobin was low at 11 at that time, down from previous of 13.2.  No follow up labs have been drawn.  Will update her labs today to help determine further treatment plan.  Discussed that this could be related to poor nutrition, but we will complete a full workup prior to making further diagnoses.   - FERRITIN; Future  - IRON/TOTAL IRON BIND; Future  - VITAMIN B1; Future  - VITAMIN K; Future    2. Aortic aneurysm without rupture, unspecified portion of aorta (HCC)  3. Aortic ectasia, thoracic (HCC)  Chronic condition, stable.  Patient is followed by vascular.   Per patient's daughter, patient is not surgical candidate.  She was on eliquis, however, she stopped in November 2020.  She is not felt to be a good candidate at this time due to age, fall risk, recurrent superficial bleeding which is difficult to control, and thrombocytopenia.  She is on rosuvastatin 40 mg daily and her blood pressure is under good control.  They will get a BP cuff to monitor patients BP daily.  Continue current regimen.      4. Atrial fibrillation and flutter (HCC)  Patient was diagnosed with new onset atrial fibrillation which was noted on her EKG during her hospitalization in October of 2021.  She did convert back to sinus during her hospital stay, but was ultimately discharged on Eliquis 2.5 mg.  On exam today, she does not appear to be in atrial fibrillation, as her pulse feels normal and regular today.  She is on metoprolol 50 mg daily and her heart rate is controlled.  After workup for thrombocytopenia is completed, will have further discussion with the patient and her daughter about anticoagulation.  She is not felt to be a good candidate at this time due to age, fall risk, recurrent superficial bleeding which is difficult to control, and thrombocytopenia.  Both the patient and her daughter verbalize understanding of that at this time.     5. Moderate protein-calorie malnutrition (HCC)  Chronic condition.  Per patient and patient's  daughter, patient is not a good eater.  She tries to eat but often does not as she is not hungry.  Suggested supplementation with Ensure shakes.     6. Bleeding diathesis (HCC)  Patient presented to the ED twice, May 3 and May 7th with difficulty controlling bleeding from two sites.   She then presented to her PCP for follow up, and a full workup of laboratory studies were ordered at that time. Patient has not had time to get these completed, but her daughter assures me that they will get these completed likely tomorrow.  They will see me back in two weeks to go over the labs and determine further treatment plan.   - Comp Metabolic Panel; Future  - FERRITIN; Future  - IRON/TOTAL IRON BIND; Future  - VITAMIN B1; Future  - VITAMIN K; Future  - PERIPHERAL SMEAR REVIEW; Future    7. Chronic obstructive pulmonary disease, unspecified COPD type (HCC)  COPD (chronic obstructive pulmonary disease) (HCC)  Chronic condition.  Patient reports that she has been tested for need for home O2 and does desaturate to the required levels.  She reports she cannot walk to her front door or mailbox without becoming severely short of breath. She was using Trelegy inhaler, but could not get a refill, so has been using her albuterol inhaler quite frequently.  She does feel that the Trelegy was helpful to her.  I will refill this today.  - Fluticasone-Umeclidin-Vilant 100-62.5-25 MCG/INH AEROSOL POWDER, BREATH ACTIVATED; Inhale 1 Puff every day.  Dispense: 3 Each; Refill: 3  - REFERRAL TO PULMONARY AND SLEEP MEDICINE  - PULMONARY FUNCTION TESTS -Test requested: Complete Pulmonary Function Test; Future    8. Current smoker  Current everyday smoker.  Patient not ready to quit at this time.     9. Essential hypertension  Chronic condition, stable. Patient takes lisinopril 10 mg daily, as well as metoprolol 50 mg daily.  Her BP in the office today is 102/58.  Patient's daughter reports that her BP is often in this range, or lower.  Discussed  getting a home BP cuff and keeping a BP log so we can see what her true range is.  Patient would like to stop one of her medications, but without more information I do not want to do this yet.  We will look at her logs in the next two week appointment and develop further treatment plan at that time.     10. Hypothyroidism, unspecified type  Chronic condition, stable.  Patient takes levothyroxine 112 mcg daily.  She does have constipation, but she does not get enough fiber in her diet, and her activity is low due to shortness of breat.  We will update her labs today.  Continue current regimen.      11. Stage 3a chronic kidney disease  Chronic condition, stable.  GFR 56 from 2019.  She is due for repeat labs.  We will continue to watch this.     12. Mixed hyperlipidemia  Chronic condition, stable.  Patient is taking rosuvastatin 40 mg daily.  She tolerates this without side-effects of myalgias/arthralgias.  She is due for updated labs.   Continue current regimen.      13.  Advanced care planning/counseling discussion  Patient and her daughter are interested in advanced care planning.  Referral to social work placed for help with advanced care planning.  - Referral to social work    Patient will follow up in two weeks, on June 1, 2021.     I have placed the below orders and discussed them with an approved delegating provider.  The MA is performing the below orders under the direction of Chioma Mccray MD.    My total time spent caring for the patient on the day of the encounter was 55 minutes.   This does not include time spent on separately billable procedures/tests.        Please note that this dictation was created using voice recognition software. I have made every reasonable attempt to correct obvious errors, but I expect that there are errors of grammar and possibly content that I did not discover before finalizing the note.

## 2021-05-18 NOTE — ASSESSMENT & PLAN NOTE
Patient was noted to have thrombocytopenia at her most recently hospitalization in October 2020. More recently, she had two episodes of bleeding that was difficult to control and was seen in the ER.  On May 3, 2021 her platelets were 70 and then 88 on May 7, 2021.  Her Hemoglobin was low at 11 at that time, down from previous of 13.2.  No follow up labs have been drawn.  Will update her labs today to help determine further treatment plan.  Discussed that this could be related to poor nutrition, but we will complete a full workup prior to making further diagnoses.

## 2021-05-18 NOTE — ASSESSMENT & PLAN NOTE
Chronic condition, stable.  Patient takes levothyroxine 112 mcg daily.  She does have constipation, but she does not get enough fiber in her diet, and her activity is low due to shortness of breat.  We will update her labs today.  Continue current regimen.

## 2021-05-18 NOTE — ASSESSMENT & PLAN NOTE
Chronic condition, stable. Patient takes lisinopril 10 mg daily, as well as metoprolol 50 mg daily.  Her BP in the office today is 102/58.  Patient's daughter reports that her BP is often in this range, or lower.  Discussed getting a home BP cuff and keeping a BP log so we can see what her true range is.  Patient would like to stop one of her medications, but without more information I do not want to do this yet.  We will look at her logs in the next two week appointment and develop further treatment plan at that time.

## 2021-05-19 ENCOUNTER — PATIENT OUTREACH (OUTPATIENT)
Dept: HEALTH INFORMATION MANAGEMENT | Facility: OTHER | Age: 86
End: 2021-05-19

## 2021-05-19 NOTE — PROGRESS NOTES
Referral received from PCP-APRN, specifying need of Advance Care Planning (unspecified). TC to pt to engage, assess and establish care plan. Call to home phone, voicemail (1) left requesting return TC.  Call to mobile phone, voicemail (1) left requesting return TC.    Plan:  Call 2 of 3 to patient on or around 5/21/2021

## 2021-05-21 ENCOUNTER — HOSPITAL ENCOUNTER (OUTPATIENT)
Dept: RADIOLOGY | Facility: MEDICAL CENTER | Age: 86
End: 2021-05-21
Attending: NURSE PRACTITIONER
Payer: MEDICARE

## 2021-05-21 ENCOUNTER — PATIENT OUTREACH (OUTPATIENT)
Dept: HEALTH INFORMATION MANAGEMENT | Facility: OTHER | Age: 86
End: 2021-05-21

## 2021-05-21 ENCOUNTER — TELEPHONE (OUTPATIENT)
Dept: MEDICAL GROUP | Facility: PHYSICIAN GROUP | Age: 86
End: 2021-05-21

## 2021-05-21 ENCOUNTER — HOSPITAL ENCOUNTER (OUTPATIENT)
Dept: LAB | Facility: MEDICAL CENTER | Age: 86
End: 2021-05-21
Attending: NURSE PRACTITIONER
Payer: MEDICARE

## 2021-05-21 DIAGNOSIS — D69.9 BLEEDING DIATHESIS (HCC): ICD-10-CM

## 2021-05-21 DIAGNOSIS — D69.6 THROMBOCYTOPENIA (HCC): ICD-10-CM

## 2021-05-21 DIAGNOSIS — Z13.29 SCREENING FOR THYROID DISORDER: ICD-10-CM

## 2021-05-21 DIAGNOSIS — R23.3 SPONTANEOUS HEMATOMA OF LOWER LEG: ICD-10-CM

## 2021-05-21 DIAGNOSIS — I73.9 PAD (PERIPHERAL ARTERY DISEASE) (HCC): ICD-10-CM

## 2021-05-21 LAB
ALBUMIN SERPL BCP-MCNC: 4.3 G/DL (ref 3.2–4.9)
ALBUMIN SERPL BCP-MCNC: 4.3 G/DL (ref 3.2–4.9)
ALBUMIN/GLOB SERPL: 1.4 G/DL
ALBUMIN/GLOB SERPL: 1.4 G/DL
ALP SERPL-CCNC: 84 U/L (ref 30–99)
ALP SERPL-CCNC: 85 U/L (ref 30–99)
ALT SERPL-CCNC: 17 U/L (ref 2–50)
ALT SERPL-CCNC: 18 U/L (ref 2–50)
ANION GAP SERPL CALC-SCNC: 12 MMOL/L (ref 7–16)
ANION GAP SERPL CALC-SCNC: 12 MMOL/L (ref 7–16)
APTT PPP: 33.4 SEC (ref 24.7–36)
AST SERPL-CCNC: 21 U/L (ref 12–45)
AST SERPL-CCNC: 22 U/L (ref 12–45)
BASOPHILS # BLD AUTO: 1.3 % (ref 0–1.8)
BASOPHILS # BLD: 0.08 K/UL (ref 0–0.12)
BILIRUB SERPL-MCNC: 0.6 MG/DL (ref 0.1–1.5)
BILIRUB SERPL-MCNC: 0.6 MG/DL (ref 0.1–1.5)
BUN SERPL-MCNC: 17 MG/DL (ref 8–22)
BUN SERPL-MCNC: 17 MG/DL (ref 8–22)
CALCIUM SERPL-MCNC: 10 MG/DL (ref 8.5–10.5)
CALCIUM SERPL-MCNC: 9.8 MG/DL (ref 8.5–10.5)
CHLORIDE SERPL-SCNC: 103 MMOL/L (ref 96–112)
CHLORIDE SERPL-SCNC: 104 MMOL/L (ref 96–112)
CO2 SERPL-SCNC: 23 MMOL/L (ref 20–33)
CO2 SERPL-SCNC: 23 MMOL/L (ref 20–33)
CREAT SERPL-MCNC: 0.79 MG/DL (ref 0.5–1.4)
CREAT SERPL-MCNC: 0.81 MG/DL (ref 0.5–1.4)
EOSINOPHIL # BLD AUTO: 0 K/UL (ref 0–0.51)
EOSINOPHIL NFR BLD: 0 % (ref 0–6.9)
ERYTHROCYTE [DISTWIDTH] IN BLOOD BY AUTOMATED COUNT: 57.1 FL (ref 35.9–50)
FERRITIN SERPL-MCNC: 88.1 NG/ML (ref 10–291)
FOLATE SERPL-MCNC: 13.8 NG/ML
GLOBULIN SER CALC-MCNC: 3 G/DL (ref 1.9–3.5)
GLOBULIN SER CALC-MCNC: 3.1 G/DL (ref 1.9–3.5)
GLUCOSE SERPL-MCNC: 85 MG/DL (ref 65–99)
GLUCOSE SERPL-MCNC: 85 MG/DL (ref 65–99)
HAV IGM SERPL QL IA: NORMAL
HBV CORE IGM SER QL: NORMAL
HBV SURFACE AG SER QL: NORMAL
HCT VFR BLD AUTO: 35.2 % (ref 37–47)
HCV AB SER QL: NORMAL
HGB BLD-MCNC: 11.5 G/DL (ref 12–16)
HIV 1+2 AB+HIV1 P24 AG SERPL QL IA: NORMAL
IMM GRANULOCYTES # BLD AUTO: 0.02 K/UL (ref 0–0.11)
IMM GRANULOCYTES NFR BLD AUTO: 0.3 % (ref 0–0.9)
INR PPP: 1.17 (ref 0.87–1.13)
IRON SATN MFR SERPL: 20 % (ref 15–55)
IRON SERPL-MCNC: 65 UG/DL (ref 40–170)
IRON SERPL-MCNC: 66 UG/DL (ref 40–170)
LYMPHOCYTES # BLD AUTO: 1.1 K/UL (ref 1–4.8)
LYMPHOCYTES NFR BLD: 17.3 % (ref 22–41)
MCH RBC QN AUTO: 32.5 PG (ref 27–33)
MCHC RBC AUTO-ENTMCNC: 32.7 G/DL (ref 33.6–35)
MCV RBC AUTO: 99.4 FL (ref 81.4–97.8)
MONOCYTES # BLD AUTO: 0.55 K/UL (ref 0–0.85)
MONOCYTES NFR BLD AUTO: 8.6 % (ref 0–13.4)
MORPHOLOGY BLD-IMP: NORMAL
MORPHOLOGY BLD-IMP: NORMAL
NEUTROPHILS # BLD AUTO: 4.62 K/UL (ref 2–7.15)
NEUTROPHILS NFR BLD: 72.5 % (ref 44–72)
NRBC # BLD AUTO: 0 K/UL
NRBC BLD-RTO: 0 /100 WBC
PLATELET # BLD AUTO: 146 K/UL (ref 164–446)
PMV BLD AUTO: 10.9 FL (ref 9–12.9)
POTASSIUM SERPL-SCNC: 4.6 MMOL/L (ref 3.6–5.5)
POTASSIUM SERPL-SCNC: 4.7 MMOL/L (ref 3.6–5.5)
PROT SERPL-MCNC: 7.3 G/DL (ref 6–8.2)
PROT SERPL-MCNC: 7.4 G/DL (ref 6–8.2)
PROTHROMBIN TIME: 15.2 SEC (ref 12–14.6)
RBC # BLD AUTO: 3.54 M/UL (ref 4.2–5.4)
SODIUM SERPL-SCNC: 138 MMOL/L (ref 135–145)
SODIUM SERPL-SCNC: 139 MMOL/L (ref 135–145)
T3 SERPL-MCNC: 70.7 NG/DL (ref 60–181)
T4 FREE SERPL-MCNC: 1.23 NG/DL (ref 0.93–1.7)
TIBC SERPL-MCNC: 335 UG/DL (ref 250–450)
TSH SERPL DL<=0.005 MIU/L-ACNC: 24.1 UIU/ML (ref 0.38–5.33)
UIBC SERPL-MCNC: 269 UG/DL (ref 110–370)
VIT B12 SERPL-MCNC: 320 PG/ML (ref 211–911)
WBC # BLD AUTO: 6.4 K/UL (ref 4.8–10.8)

## 2021-05-21 PROCEDURE — 86665 EPSTEIN-BARR CAPSID VCA: CPT | Mod: 91

## 2021-05-21 PROCEDURE — 83540 ASSAY OF IRON: CPT

## 2021-05-21 PROCEDURE — 82746 ASSAY OF FOLIC ACID SERUM: CPT

## 2021-05-21 PROCEDURE — 36415 COLL VENOUS BLD VENIPUNCTURE: CPT

## 2021-05-21 PROCEDURE — 93970 EXTREMITY STUDY: CPT | Mod: 26 | Performed by: INTERNAL MEDICINE

## 2021-05-21 PROCEDURE — 80074 ACUTE HEPATITIS PANEL: CPT

## 2021-05-21 PROCEDURE — 84443 ASSAY THYROID STIM HORMONE: CPT

## 2021-05-21 PROCEDURE — 84597 ASSAY OF VITAMIN K: CPT

## 2021-05-21 PROCEDURE — 80053 COMPREHEN METABOLIC PANEL: CPT | Mod: 91

## 2021-05-21 PROCEDURE — 80053 COMPREHEN METABOLIC PANEL: CPT

## 2021-05-21 PROCEDURE — 84439 ASSAY OF FREE THYROXINE: CPT

## 2021-05-21 PROCEDURE — 83550 IRON BINDING TEST: CPT

## 2021-05-21 PROCEDURE — 86664 EPSTEIN-BARR NUCLEAR ANTIGEN: CPT

## 2021-05-21 PROCEDURE — 85025 COMPLETE CBC W/AUTO DIFF WBC: CPT

## 2021-05-21 PROCEDURE — 86663 EPSTEIN-BARR ANTIBODY: CPT

## 2021-05-21 PROCEDURE — 82728 ASSAY OF FERRITIN: CPT

## 2021-05-21 PROCEDURE — 83540 ASSAY OF IRON: CPT | Mod: 91

## 2021-05-21 PROCEDURE — 85610 PROTHROMBIN TIME: CPT

## 2021-05-21 PROCEDURE — 84480 ASSAY TRIIODOTHYRONINE (T3): CPT

## 2021-05-21 PROCEDURE — 82607 VITAMIN B-12: CPT

## 2021-05-21 PROCEDURE — 84425 ASSAY OF VITAMIN B-1: CPT

## 2021-05-21 PROCEDURE — 87389 HIV-1 AG W/HIV-1&-2 AB AG IA: CPT

## 2021-05-21 PROCEDURE — 85730 THROMBOPLASTIN TIME PARTIAL: CPT

## 2021-05-21 PROCEDURE — 86645 CMV ANTIBODY IGM: CPT

## 2021-05-21 PROCEDURE — 93970 EXTREMITY STUDY: CPT

## 2021-05-21 RX ORDER — LEVOTHYROXINE SODIUM 0.12 MG/1
125 TABLET ORAL
Qty: 90 TABLET | Refills: 1 | Status: SHIPPED | OUTPATIENT
Start: 2021-05-21 | End: 2022-02-14 | Stop reason: SDUPTHER

## 2021-05-21 NOTE — TELEPHONE ENCOUNTER
----- Message from PRADIP De La O sent at 5/21/2021  1:24 PM PDT -----  Please call patient and let her know there is no sign of a blood clot, but there is decreased arterial floow in her bilateral lower extremities. I am going to place a referral to vascular for her.

## 2021-05-21 NOTE — PROGRESS NOTES
Call 2 to patient. Patient gave verbal permission for SW to speak with daughter Melissa. Call to Melissa. Voicemail (2) left for kin requesting return TC.    Plan:  Call 3 of 3 to kin on or around 5/24/2021

## 2021-05-21 NOTE — TELEPHONE ENCOUNTER
Phone Number Called: [phone] (home) [phone] (work)      Call outcome: Spoke to patient regarding message below.    Message: pt advised

## 2021-05-24 ENCOUNTER — PATIENT OUTREACH (OUTPATIENT)
Dept: HEALTH INFORMATION MANAGEMENT | Facility: OTHER | Age: 86
End: 2021-05-24

## 2021-05-24 LAB
CMV IGM SERPL IA-ACNC: <8 AU/ML
EBV EA-D IGG SER-ACNC: >150 U/ML (ref 0–10.9)
EBV NA IGG SER IA-ACNC: 410 U/ML (ref 0–21.9)
EBV VCA IGG SER IA-ACNC: >750 U/ML (ref 0–21.9)
EBV VCA IGM SER IA-ACNC: <10 U/ML (ref 0–43.9)

## 2021-05-24 NOTE — PROGRESS NOTES
Call 3 to kin. Introduced self and reason for call. Kin specifically requesting Advance Directive Packet be mailed. Also discussed Med Alert options and SCP transportation. All questions answered. Kin declined patient having any additional social needs.    Mailed:  Advance Directive Packet  Med Alert Option List    Plan:  Social need met at time of contact  SW will not actively follow patient

## 2021-05-25 LAB — PHYTONADIONE SERPL-MCNC: 0.81 NMOL/L (ref 0.22–4.88)

## 2021-05-26 LAB — VIT B1 BLD-MCNC: 135 NMOL/L (ref 70–180)

## 2021-06-01 ENCOUNTER — OFFICE VISIT (OUTPATIENT)
Dept: MEDICAL GROUP | Facility: PHYSICIAN GROUP | Age: 86
End: 2021-06-01
Payer: MEDICARE

## 2021-06-01 VITALS
HEART RATE: 88 BPM | HEIGHT: 62 IN | OXYGEN SATURATION: 95 % | DIASTOLIC BLOOD PRESSURE: 68 MMHG | SYSTOLIC BLOOD PRESSURE: 102 MMHG | RESPIRATION RATE: 14 BRPM | BODY MASS INDEX: 15.64 KG/M2 | TEMPERATURE: 98.8 F | WEIGHT: 85 LBS

## 2021-06-01 DIAGNOSIS — D68.9 COAGULATION DEFECT (HCC): ICD-10-CM

## 2021-06-01 DIAGNOSIS — I71.9 AORTIC ANEURYSM WITHOUT RUPTURE, UNSPECIFIED PORTION OF AORTA (HCC): ICD-10-CM

## 2021-06-01 DIAGNOSIS — I77.819 AORTIC ECTASIA (HCC): ICD-10-CM

## 2021-06-01 DIAGNOSIS — I70.90 ARTERIAL OCCLUSION: ICD-10-CM

## 2021-06-01 DIAGNOSIS — I48.92 ATRIAL FIBRILLATION AND FLUTTER (HCC): ICD-10-CM

## 2021-06-01 DIAGNOSIS — E78.5 DYSLIPIDEMIA: ICD-10-CM

## 2021-06-01 DIAGNOSIS — E44.0 MODERATE PROTEIN-CALORIE MALNUTRITION (HCC): ICD-10-CM

## 2021-06-01 DIAGNOSIS — I48.91 ATRIAL FIBRILLATION AND FLUTTER (HCC): ICD-10-CM

## 2021-06-01 DIAGNOSIS — J44.9 CHRONIC OBSTRUCTIVE PULMONARY DISEASE, UNSPECIFIED COPD TYPE (HCC): ICD-10-CM

## 2021-06-01 DIAGNOSIS — E03.9 HYPOTHYROIDISM, UNSPECIFIED TYPE: ICD-10-CM

## 2021-06-01 DIAGNOSIS — R62.7 FAILURE TO THRIVE IN ADULT: ICD-10-CM

## 2021-06-01 DIAGNOSIS — D69.6 THROMBOCYTOPENIA (HCC): ICD-10-CM

## 2021-06-01 DIAGNOSIS — Z71.89 ADVANCED CARE PLANNING/COUNSELING DISCUSSION: ICD-10-CM

## 2021-06-01 DIAGNOSIS — D69.9 BLEEDING DIATHESIS (HCC): Primary | ICD-10-CM

## 2021-06-01 PROCEDURE — 99214 OFFICE O/P EST MOD 30 MIN: CPT | Performed by: NURSE PRACTITIONER

## 2021-06-01 RX ORDER — ROSUVASTATIN CALCIUM 40 MG/1
40 TABLET, COATED ORAL DAILY
Qty: 100 TABLET | Refills: 2 | Status: SHIPPED | OUTPATIENT
Start: 2021-06-01 | End: 2022-05-06

## 2021-06-01 ASSESSMENT — FIBROSIS 4 INDEX: FIB4 SCORE: 3.07

## 2021-06-01 NOTE — ASSESSMENT & PLAN NOTE
Chronic condition, stable.  Patient's last TSH was 24.1 on 5/21/2021.  Patient's last provider increased her levothyroxine to 125 mcg daily at that time.  Patient does complain of extreme fatigue, napping up to six times per day.  Will repeat these labs in six weeks.

## 2021-06-01 NOTE — PROGRESS NOTES
Annual Health Assessment Questions:    1.  Are you currently engaging in any exercise or physical activity? No    2.  How would you describe your mood or emotional well-being today? fair    3.  Have you had any falls in the last year? No    4.  Have you noticed any problems with your balance or had difficulty walking? Yes    5.  In the last six months have you experienced any leakage of urine? Yes    6. DPA/Advanced Directive: Patient does not have an Advanced Directive.  A packet and workshop information was given on Advanced Directives.

## 2021-06-01 NOTE — ASSESSMENT & PLAN NOTE
Patient returns for discussion of comprehensive workup ordered for uncontrolled bleeding she has been having.  Reviewed with patient that the ultrasounds of her bilateral lower extremities reveals that she may have arterial occlusions bilaterally.  Her prior provider referred her to vascular surgery for consultation.  Discussed this with patient and she prefers not to have any further interventions.  Also discussed with patient that her poor nutritional level at this time is contributing to her low platelet counts and blood cell counts, which is contributing to her prolonged bleeding episodes as well as making her a poor candidate for anticoagulation.  Patient verbalized understanding of this.

## 2021-06-01 NOTE — ASSESSMENT & PLAN NOTE
Chronic condition, stable.  Patient is known to have a 4.9 cm AAA with a slight increase in size over the prior year and a half.  She does follow with Dr. Aknis of vascular medicine.  She does take rosuvastatin, though I am not certain her compliance is 100%.   Her BP is under good control at 102/68.  She does take At this time, I feel patient is a poor candidate for anticoagulation therapy.  She is a very high fall risk, lives alone, uses a walker, has protein calorie malnutrition, with recent blood indices reflecting this. She is also weak and has lost five pounds since her last visit in May.   Her platelets are 146, which is improved from 88.  Discussed with patient that I will defer this decision to her vascular physician, but it is my opinion that she should not be anticoagulated at this time.  Patient reports that she does not want any more medical interventions at this time.  She has a follow up in November with Dr. Akins.

## 2021-06-01 NOTE — ASSESSMENT & PLAN NOTE
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  Referral was placed to vascular.  Patient tells me that she does not want to have any more procedures, but we discussed that I would like her to see vascular for potential treatment recommendations, especially in light of the fact that I do not believe she is a candidate for anticoagulation at this time due to her high fall risk, living alone, protein calorie malnutrition, questionable medication compliance.  Patient verbalized understanding and reports she will see vascular.

## 2021-06-01 NOTE — ASSESSMENT & PLAN NOTE
Patient diagnosed with new onset atrial fibrillation noted on EKG during hospitalization in October 2021.  She has converted back to sinus rhythm since that time. Her heart has a regular rate and rhythm today in the office.   She does take rosuvastatin, though I am not certain her compliance is 100%.  Her BP is under good control at 102/68 and her heart rate is 88.  At this time, I feel patient is a poor candidate for anticoagulation therapy.  She is a very high fall risk, lives alone, uses a walker, has protein calorie malnutrition, with recent blood indices reflecting this. She is also weak and has lost five pounds since her last visit in May.   Her platelets are 146, which is improved from 88.  Discussed with patient that I will defer this decision to her vascular physician, but it is my opinion that she should not be anticoagulated at this time.  Patient reports that she does not want any more medical interventions at this time.  She has a follow up in November with Dr. Akins.

## 2021-06-01 NOTE — ASSESSMENT & PLAN NOTE
Chronic condition, stable.  Patient is known to have a 3.5 cm ascending aortic ectasia.  She does follow with Dr. Akins of vascular medicine.  She does take rosuvastatin, though I am not certain her compliance is 100%.   Her BP is under good control at 102/68.  She does take At this time, I feel patient is a poor candidate for anticoagulation therapy.  She is a very high fall risk, lives alone, uses a walker, has protein calorie malnutrition, with recent blood indices reflecting this. She is also weak and has lost five pounds since her last visit in May.   Her platelets are 146, which is improved from 88.  Discussed with patient that I will defer this decision to her vascular physician, but it is my opinion that she should not be anticoagulated at this time.  Patient reports that she does not want any more medical interventions at this time.  She has a follow up in November with Dr. Akins.

## 2021-06-01 NOTE — ASSESSMENT & PLAN NOTE
Chronic condition, worsening.  Patient has lost 5 pounds since her last visit on 5/18/2021.  She reports that nothing tastes good and she just isn't eating.  She states that she does have Ensure at home, but she thinks she only drinks one per day at the most.  Discussed with patient the need to increase her nutrition, as it relates to her overall health prognosis.  She does verbalize that she knows this, but that it is just difficult for her to eat as she is not hungry.  She will try to drink at least two Ensure shakes per day, and possibly three if she feels she can intake that much.

## 2021-06-01 NOTE — ASSESSMENT & PLAN NOTE
Chronic condition.  At this time, I feel patient is a poor candidate for anticoagulation therapy.  She is a very high fall risk, lives alone, uses a walker, has protein calorie malnutrition, with recent blood indices reflecting this. She is also weak and has lost five pounds since her last visit in May. Her platelets are 146, which is improved from 88.  Discussed with patient that I will defer this decision to her vascular physician, but it is my opinion that she should not be anticoagulated at this time.  Patient reports that she does not want any more medical interventions at this time.

## 2021-06-01 NOTE — ASSESSMENT & PLAN NOTE
Discussed with patient that I placed a referral for  to complete Advanced Care Planning put into place.  Patient reports that she does not wish for further interventions and would not like CPR or intubation.  At her last hospitalization in October 2020 she was DNAR/DNI, and she would like to remain this status. Patient was sent home with an Advanced Care Planning packet today and she will discuss this with her daughters.

## 2021-06-01 NOTE — ASSESSMENT & PLAN NOTE
Patient has lost 5 pounds since her last visit on May 18th.  She is now 85 pounds with a BMI of 15.55.  Discussed with patient that I am concerned about her health and this weight loss.  She states that she is just not hungry and often takes up to 6 naps per day.  She does live independently at this time.  She has a daughter who lives nearby who is able to help take care of her.  She also reports that she does not want to pursue more medical interventions.  Discussed Advanced Care Planning with patient and sent her home with a packet.

## 2021-06-01 NOTE — ASSESSMENT & PLAN NOTE
Chronic condition, slightly improved.  Patient's platelet count has improved to 146 on May 21, from 88 on May 7th.  Her hemoglobin was 11 and has improved to 11.5, and her hematocrit was and is now 35.2 and was 34.1. Discussed with patient that I believe this is due to her poor oral intake and malnutrition.  Patient does report that eating is difficult and sometimes she cannot even finish an entire Ensure shake.  Discussed that I would like patient to concentrate on improving her oral intake and drinking more Ensure if she can. Patient will try to do this.

## 2021-06-01 NOTE — ASSESSMENT & PLAN NOTE
Chronic condition, stable. Patient reports that she feels tired and weak.  Her O2 sat in the office today after ambulation is 95%.  She does not use any oxygen at this time. She does have an appointment with pulmonary medicine in September.  Discussed that she really does not want anymore treatments, but I do recommend that she follow up with pulmonary for further evaluation and treatment recommendations. She uses a Trelegy inhaler daily and uses albuterol inhaler for rescue.  She reports she manages okay with this at this time. She is a daily smoker and does continue to smoke.  She does not wish to quit at this time.

## 2021-06-02 NOTE — PROGRESS NOTES
Subjective:     CC: Follow up of bleeding diathesis, laboratory workup.    HPI:   Mary presents today to follow up of her extensive bleeding diathesis workup.  She was noted to have bilateral arterial occlusions on her bilateral lower extremity Doppler.  She was also noted to have blood cell indices indicative of malnutrition, anemia, and thrombocytopenia.  Discussed with patient that I do not feel that she is a good candidate for anticoagulation due to her frail state, her profound malnutrition (she has lost 5 pounds since May 18th), and the fall risk - patient also lives alone.  We discussed advanced care planning, and I sent patient home with a booklet for both she and her daughters to do. Social work has already reached out to them.  Patient has expressed to me that she would like to remain a DNAR/DNI at this time.    Past Medical History:   Diagnosis Date   • Aortic aneurysm without rupture (HCC)    • Atherosclerosis of aorta (HCC) 3/27/2019   • Cataract     +surgery bilat   • Compression fracture of fourth lumbar vertebra (HCC) 3/28/2018   • Dental disorder 03/20/2018    upper and lower   • Emphysema of lung (HCC)    • Gastroesophageal reflux disease without esophagitis 5/11/2018   • Hiatus hernia syndrome    • Hypothyroidism 2/8/2016   • Mixed hyperlipidemia 12/7/2016   • Protein-calorie malnutrition (HCC) 3/28/2018       Social History     Tobacco Use   • Smoking status: Current Every Day Smoker     Packs/day: 1.00     Years: 50.00     Pack years: 50.00     Types: Cigarettes   • Smokeless tobacco: Never Used   Vaping Use   • Vaping Use: Never used   Substance Use Topics   • Alcohol use: Not Currently     Alcohol/week: 0.6 oz     Types: 1 Glasses of wine per week     Comment: 3 per week   • Drug use: No       Current Outpatient Medications Ordered in Epic   Medication Sig Dispense Refill   • rosuvastatin (CRESTOR) 40 MG tablet Take 1 tablet by mouth every day for 360 days. 100 tablet 2   • levothyroxine  "(SYNTHROID) 125 MCG Tab Take 1 tablet by mouth every morning on an empty stomach. 90 tablet 1   • Fluticasone-Umeclidin-Vilant 100-62.5-25 MCG/INH AEROSOL POWDER, BREATH ACTIVATED Inhale 1 Puff every day. 3 Each 3   • albuterol 108 (90 Base) MCG/ACT Aero Soln inhalation aerosol Inhale 2 Puffs every 6 hours as needed for Shortness of Breath. 1 Each 5   • metoprolol SR (TOPROL XL) 50 MG TABLET SR 24 HR Take 1 tablet by mouth every day. 100 tablet 1   • lisinopril (PRINIVIL) 10 MG Tab Take 1 tablet by mouth every day. 100 tablet 1   • alendronate (FOSAMAX) 70 MG Tab Take 1 Tab by mouth every 7 days. 12 Tab 1   • Nutritional Supplements (VITAMIN D BOOSTER PO) Take 1 Tab by mouth every day.       No current [facility]-ordered facility-administered medications on file.       Allergies:  Patient has no known allergies.    Health Maintenance: Completed    ROS:  Gen: no fevers/chills, no changes in weight  Eyes: no changes in vision  ENT: no sore throat, no hearing loss, no bloody nose  Pulm: no sob, no cough  CV: no chest pain, no palpitations  GI: no nausea/vomiting, no diarrhea  : no dysuria  MSk: no myalgias  Skin: no rash  Neuro: no headaches, no numbness/tingling  Heme/Lymph: no easy bruising      Objective:       Exam:  /68   Pulse 88   Temp 37.1 °C (98.8 °F)   Resp 14   Ht 1.575 m (5' 2\")   Wt 38.6 kg (85 lb)   LMP 03/03/1970 (Approximate)   SpO2 95%   BMI 15.55 kg/m²  Body mass index is 15.55 kg/m².    Gen: Alert and oriented, No apparent distress.  Neck: Neck is supple without lymphadenopathy.  No carotid bruits.  Lungs: Normal effort, CTA bilaterally, no wheezes, rhonchi, or rales  CV: Regular rate and rhythm. No murmurs, rubs, or gallops.  Ext: No clubbing, cyanosis, edema.    Labs: As below.     Assessment & Plan:     88 y.o. female with the following -     1. Bleeding diathesis (HCC)  Patient returns for discussion of comprehensive workup ordered for uncontrolled bleeding she has been having.  " Reviewed with patient that the ultrasounds of her bilateral lower extremities reveals that she may have arterial occlusions bilaterally.  Her prior provider referred her to vascular surgery for consultation.  Discussed this with patient and she prefers not to have any further interventions.  Also discussed with patient that her poor nutritional level at this time is contributing to her low platelet counts and blood cell counts, which is contributing to her prolonged bleeding episodes as well as making her a poor candidate for anticoagulation.  Patient verbalized understanding of this.    2. Thrombocytopenia (HCC)  Chronic condition, slightly improved.  Patient's platelet count has improved to 146 on May 21, from 88 on May 7th.  Her hemoglobin was 11 and has improved to 11.5, and her hematocrit was and is now 35.2 and was 34.1. Discussed with patient that I believe this is due to her poor oral intake and malnutrition.  Patient does report that eating is difficult and sometimes she cannot even finish an entire Ensure shake.  Discussed that I would like patient to concentrate on improving her oral intake and drinking more Ensure if she can. Patient will try to do this.     3. Moderate protein-calorie malnutrition (HCC)  Chronic condition, worsening.  Patient has lost 5 pounds since her last visit on 5/18/2021.  She reports that nothing tastes good and she just isn't eating.  She states that she does have Ensure at home, but she thinks she only drinks one per day at the most.  Discussed with patient the need to increase her nutrition, as it relates to her overall health prognosis.  She does verbalize that she knows this, but that it is just difficult for her to eat as she is not hungry.  She will try to drink at least two Ensure shakes per day, and possibly three if she feels she can intake that much.      4. Chronic obstructive pulmonary disease, unspecified COPD type (HCC)  Chronic condition, stable. Patient reports  that she feels tired and weak.  Her O2 sat in the office today after ambulation is 95%.  She does not use any oxygen at this time. She does have an appointment with pulmonary medicine in September.  Discussed that she really does not want anymore treatments, but I do recommend that she follow up with pulmonary for further evaluation and treatment recommendations. She uses a Trelegy inhaler daily and uses albuterol inhaler for rescue.  She reports she manages okay with this at this time. She is a daily smoker and does continue to smoke.  She does not wish to quit at this time.     5. Atrial fibrillation and flutter (HCC)  Patient diagnosed with new onset atrial fibrillation noted on EKG during hospitalization in October 2021.  She has converted back to sinus rhythm since that time. Her heart has a regular rate and rhythm today in the office.   She does take rosuvastatin, though I am not certain her compliance is 100%.  Her BP is under good control at 102/68 and her heart rate is 88.  At this time, I feel patient is a poor candidate for anticoagulation therapy.  She is a very high fall risk, lives alone, uses a walker, has protein calorie malnutrition, with recent blood indices reflecting this. She is also weak and has lost five pounds since her last visit in May.   Her platelets are 146, which is improved from 88.  Discussed with patient that I will defer this decision to her vascular physician, but it is my opinion that she should not be anticoagulated at this time.  Patient reports that she does not want any more medical interventions at this time.  She has a follow up in November with Dr. Akins.      6. Coagulation defect (HCC)  Chronic condition.  At this time, I feel patient is a poor candidate for anticoagulation therapy.  She is a very high fall risk, lives alone, uses a walker, has protein calorie malnutrition, with recent blood indices reflecting this. She is also weak and has lost five pounds since her  last visit in May. Her platelets are 146, which is improved from 88.  Discussed with patient that I will defer this decision to her vascular physician, but it is my opinion that she should not be anticoagulated at this time.  Patient reports that she does not want any more medical interventions at this time.      7. Aortic aneurysm without rupture, unspecified portion of aorta (HCC)  Chronic condition, stable.  Patient is known to have a 4.9 cm AAA with a slight increase in size over the prior year and a half.  She does follow with Dr. Akins of vascular medicine.  She does take rosuvastatin, though I am not certain her compliance is 100%.   Her BP is under good control at 102/68.  She does take At this time, I feel patient is a poor candidate for anticoagulation therapy.  She is a very high fall risk, lives alone, uses a walker, has protein calorie malnutrition, with recent blood indices reflecting this. She is also weak and has lost five pounds since her last visit in May.   Her platelets are 146, which is improved from 88.  Discussed with patient that I will defer this decision to her vascular physician, but it is my opinion that she should not be anticoagulated at this time.  Patient reports that she does not want any more medical interventions at this time.  She has a follow up in November with Dr. Akins.      8. Aortic ectasia (HCC)  Chronic condition, stable.  Patient is known to have a 3.5 cm ascending aortic ectasia.  She does follow with Dr. Akins of vascular medicine.  She does take rosuvastatin, though I am not certain her compliance is 100%.   Her BP is under good control at 102/68.  She does take At this time, I feel patient is a poor candidate for anticoagulation therapy.  She is a very high fall risk, lives alone, uses a walker, has protein calorie malnutrition, with recent blood indices reflecting this. She is also weak and has lost five pounds since her last visit in May.   Her platelets  are 146, which is improved from 88.  Discussed with patient that I will defer this decision to her vascular physician, but it is my opinion that she should not be anticoagulated at this time.  Patient reports that she does not want any more medical interventions at this time.  She has a follow up in November with Dr. Akins.      9. Arterial occlusion  Patient had bilateral Dopplers of her legs and there was noted to be bilateral arterial occlusions.  Referral was placed to vascular.  Patient tells me that she does not want to have any more procedures, but we discussed that I would like her to see vascular for potential treatment recommendations, especially in light of the fact that I do not believe she is a candidate for anticoagulation at this time due to her high fall risk, living alone, protein calorie malnutrition, questionable medication compliance.  Patient verbalized understanding and reports she will see vascular.      10. Hypothyroidism, unspecified type  Chronic condition, stable.  Patient's last TSH was 24.1 on 5/21/2021.  Patient's last provider increased her levothyroxine to 125 mcg daily at that time.  Patient does complain of extreme fatigue, napping up to six times per day.  Will repeat these labs in six weeks.      11. Dyslipidemia  Chronic condition, stable.  Continue current regimen.    - rosuvastatin (CRESTOR) 40 MG tablet; Take 1 tablet by mouth every day for 360 days.  Dispense: 100 tablet; Refill: 2    12. Failure to thrive in adult  Patient has lost 5 pounds since her last visit on May 18th.  She is now 85 pounds with a BMI of 15.55.  Discussed with patient that I am concerned about her health and this weight loss.  She states that she is just not hungry and often takes up to 6 naps per day.  She does live independently at this time.  She has a daughter who lives nearby who is able to help take care of her.  She also reports that she does not want to pursue more medical interventions.   Discussed Advanced Care Planning with patient and sent her home with a packet.      13. Advanced care planning/counseling discussion  Discussed with patient that I placed a referral for  to complete Advanced Care Planning put into place.  Patient reports that she does not wish for further interventions and would not like CPR or intubation.  At her last hospitalization in October 2020 she was DNAR/DNI, and she would like to remain this status. Patient was sent home with an Advanced Care Planning packet today and she will discuss this with her daughters.     Patient will follow up in three to six months, sooner as need arises.    I have placed the below orders and discussed them with an approved delegating provider.  The MA is performing the below orders under the direction of Chioma Mccray MD.    Please note that this dictation was created using voice recognition software. I have made every reasonable attempt to correct obvious errors, but I expect that there are errors of grammar and possibly content that I did not discover before finalizing the note.

## 2021-06-04 ENCOUNTER — APPOINTMENT (OUTPATIENT)
Dept: RADIOLOGY | Facility: MEDICAL CENTER | Age: 86
End: 2021-06-04
Attending: NURSE PRACTITIONER
Payer: MEDICARE

## 2021-07-20 ENCOUNTER — TELEPHONE (OUTPATIENT)
Dept: MEDICAL GROUP | Facility: PHYSICIAN GROUP | Age: 86
End: 2021-07-20

## 2021-07-20 DIAGNOSIS — E03.9 HYPOTHYROIDISM, UNSPECIFIED TYPE: ICD-10-CM

## 2021-07-20 NOTE — TELEPHONE ENCOUNTER
----- Message from PRADIP Ho sent at 7/20/2021  8:35 AM PDT -----  Can you call Jackelin and let her know that I would like to recheck her thyroid levels and there is a lab order placed for her to do that at any [facility] lab please,  Noreen

## 2021-07-26 NOTE — TELEPHONE ENCOUNTER
ISAI-    Called pt daughter Lynette back and informed her of new lab orders to please relay to pt.   Pt daughter stated that she will relay message to pt but to please give her mom a week to complete due to her not having access to a vehicle that often.

## 2021-07-26 NOTE — TELEPHONE ENCOUNTER
Called pt daughter Lynette to relay message to pt because the pt is hard of hearing and has not read the my chart message that I sent last week.    Left voicemail message for patient to call the office back at [phone]

## 2021-09-16 ENCOUNTER — DOCUMENTATION (OUTPATIENT)
Dept: VASCULAR LAB | Facility: MEDICAL CENTER | Age: 86
End: 2021-09-16

## 2021-09-16 DIAGNOSIS — I71.9 AORTIC ANEURYSM WITHOUT RUPTURE, UNSPECIFIED PORTION OF AORTA (HCC): ICD-10-CM

## 2021-09-21 ENCOUNTER — TELEPHONE (OUTPATIENT)
Dept: MEDICAL GROUP | Facility: PHYSICIAN GROUP | Age: 86
End: 2021-09-21

## 2021-09-21 NOTE — TELEPHONE ENCOUNTER
----- Message from PRADIP Ho sent at 9/21/2021  9:03 AM PDT -----  Hi,  Can you call this patient and see if she would like to do her thyroid labs please?  Noreen:)

## 2021-09-21 NOTE — TELEPHONE ENCOUNTER
TAL Coppola    Contacted pt daughter and gave her the reminder to have pt completed her thyroid labs.    Pt daughter told me that her sister passed away yesterday from [employer] and she stated that it isnt easy to get her mother (pt) out of the house so it might be awhile.

## 2021-11-03 ENCOUNTER — OFFICE VISIT (OUTPATIENT)
Dept: MEDICAL GROUP | Facility: PHYSICIAN GROUP | Age: 86
End: 2021-11-03
Payer: MEDICARE

## 2021-11-03 VITALS
SYSTOLIC BLOOD PRESSURE: 128 MMHG | OXYGEN SATURATION: 95 % | TEMPERATURE: 97.6 F | DIASTOLIC BLOOD PRESSURE: 72 MMHG | RESPIRATION RATE: 14 BRPM | WEIGHT: 83 LBS | HEART RATE: 117 BPM | BODY MASS INDEX: 15.27 KG/M2 | HEIGHT: 62 IN

## 2021-11-03 DIAGNOSIS — J44.9 CHRONIC OBSTRUCTIVE PULMONARY DISEASE, UNSPECIFIED COPD TYPE (HCC): ICD-10-CM

## 2021-11-03 DIAGNOSIS — S81.802A NON-HEALING WOUND OF LEFT LOWER EXTREMITY: Primary | ICD-10-CM

## 2021-11-03 PROCEDURE — 99214 OFFICE O/P EST MOD 30 MIN: CPT | Performed by: NURSE PRACTITIONER

## 2021-11-03 RX ORDER — ALBUTEROL SULFATE 90 UG/1
2 AEROSOL, METERED RESPIRATORY (INHALATION) EVERY 6 HOURS PRN
Qty: 1 EACH | Refills: 5 | Status: SHIPPED | OUTPATIENT
Start: 2021-11-03 | End: 2021-11-22

## 2021-11-03 ASSESSMENT — FIBROSIS 4 INDEX: FIB4 SCORE: 3.07

## 2021-11-04 ENCOUNTER — DOCUMENTATION (OUTPATIENT)
Dept: VASCULAR LAB | Facility: MEDICAL CENTER | Age: 86
End: 2021-11-04

## 2021-11-04 NOTE — PROGRESS NOTES
Called pt to remind that the  Aortoiliac ultra sound   is due for surveillance. Scheduling office and our office phone numbers provided. NOE Pierre.

## 2021-11-06 PROBLEM — S81.802A NON-HEALING WOUND OF LEFT LOWER EXTREMITY: Status: ACTIVE | Noted: 2021-11-06

## 2021-11-07 NOTE — ASSESSMENT & PLAN NOTE
Chronic condition, stable.  Patient continues to feel tired and weak.  She did not keep her appointment with pulmonology in September.  She continues to not want much in the way of treatment.  Her daughter has recently passed away from Covid-19 unexpectedly, as she was vaccinated.  Patient would like a refill of both of her inhalers - which were refilled today.

## 2021-11-07 NOTE — ASSESSMENT & PLAN NOTE
"Acute condition. Patient presents with a new wound to the left lower extremity.  She does not remember any trauma to the area.  She has an area of redness and bruising on the distal medial aspect of her shin.  There is an approximately 3\" x 4\" area which is raised and has the appearance of a blood blister.  She has had one of these prior on her left forearm, which broke open and she had to present to the ER x3 to get the bleeding to stop.  At that time, a thorough workup was done revealing macrocytic anemia and thrombocytopenia.  Patient maintains poor nutritional status and continues to be a daily smoker.  She reports that she tries to drink Ensure shakes, but yesterday she could only have a half of one. Her son-in-law presents to the appointment with her today, as her daughter has recently passed away unexpected from Covid-19.  Discussed with patient and her son-in-law, that the last time I saw patient and we discussed her labs, patient did not want to pursue further treatment or workup.  She had been referred to vascular medicine for evaluation of her arterial occlusions found on ultrasound, but patient did not want to pursue any further appointments or treatment at that time.  After further discussion with patient and her son-in-law, they have decided to make an appointment with vascular medicine. She already has an approved referral and she was given the number to call for appointment. Referral to home health for wound care was also placed today.  "

## 2021-11-07 NOTE — PROGRESS NOTES
Subjective:     CC: Nonhealing wound, left lower extremity, COPD    HPI:   Mary presents today with a nonhealing wound of the left lower extremity and a new wound on her right forearm.  Patient remembers hitting her right wrist and causing this bruising and the subsequent fluid filled blister.  However, she does not remember any trauma to the left lower extremity causing the bruising and fluid filled blister there.  She presents today with her son-in-law who is here with her because her daughter recently passed away with Covid 19.  Patient does continue to smoke and has poor nutrition.  In fact, she has lost another couple of pounds since her last visit.  At her last visit, discussion with patient and her daughter resulted in patient reporting that she wished to remain a DNR/DNI and did not want to pursue further treatment for her health conditions.  Specifically, she did not want to see vascular for her recently noted arterial occlusions on ultrasound.      Past Medical History:   Diagnosis Date   • Aortic aneurysm without rupture (HCC)    • Atherosclerosis of aorta (HCC) 3/27/2019   • Cataract     +surgery bilat   • Compression fracture of fourth lumbar vertebra (HCC) 3/28/2018   • Dental disorder 03/20/2018    upper and lower   • Emphysema of lung (HCC)    • Gastroesophageal reflux disease without esophagitis 5/11/2018   • Hiatus hernia syndrome    • Hypothyroidism 2/8/2016   • Mixed hyperlipidemia 12/7/2016   • Protein-calorie malnutrition (HCC) 3/28/2018       Social History     Tobacco Use   • Smoking status: Current Every Day Smoker     Packs/day: 1.00     Years: 50.00     Pack years: 50.00     Types: Cigarettes   • Smokeless tobacco: Never Used   Vaping Use   • Vaping Use: Never used   Substance Use Topics   • Alcohol use: Not Currently     Alcohol/week: 0.6 oz     Types: 1 Glasses of wine per week     Comment: 3 per week   • Drug use: No       Current Outpatient Medications Ordered in Epic   Medication Sig  "Dispense Refill   • albuterol 108 (90 Base) MCG/ACT Aero Soln inhalation aerosol Inhale 2 Puffs every 6 hours as needed for Shortness of Breath. 1 Each 5   • Fluticasone-Umeclidin-Vilant (TRELEGY) 100-62.5-25 MCG/INH AEROSOL POWDER, BREATH ACTIVATED inhalation Inhale 1 Inhalation every day. 3 Each 3   • rosuvastatin (CRESTOR) 40 MG tablet Take 1 tablet by mouth every day for 360 days. 100 tablet 2   • levothyroxine (SYNTHROID) 125 MCG Tab Take 1 tablet by mouth every morning on an empty stomach. 90 tablet 1   • lisinopril (PRINIVIL) 10 MG Tab Take 1 tablet by mouth every day. 100 tablet 1   • alendronate (FOSAMAX) 70 MG Tab Take 1 Tab by mouth every 7 days. 12 Tab 1   • Nutritional Supplements (VITAMIN D BOOSTER PO) Take 1 Tab by mouth every day.     • metoprolol SR (TOPROL XL) 50 MG TABLET SR 24 HR Take 1 tablet by mouth every day. 100 tablet 1     No current Epic-ordered facility-administered medications on file.       Allergies:  Patient has no known allergies.    Health Maintenance: Deferred.     ROS:  Gen: no fevers/chills, +weight loss  Eyes: no changes in vision  ENT: no sore throat, no hearing loss, no bloody nose  Pulm: no sob, no cough  CV: no chest pain, no palpitations  GI: no nausea/vomiting, no diarrhea  : no dysuria  MSk: no myalgias  Skin: no rash.  Bruising and fluid filled blisters to the right upper extremity and the left lower extremity.   Neuro: no headaches, no numbness/tingling  Heme/Lymph: no easy bruising      Objective:       Exam:  /72   Pulse (!) 117   Temp 36.4 °C (97.6 °F)   Resp 14   Ht 1.575 m (5' 2\")   Wt 37.6 kg (83 lb)   LMP 03/03/1970 (Approximate)   SpO2 95%   BMI 15.18 kg/m²  Body mass index is 15.18 kg/m².    Gen: Alert and oriented, No apparent distress. Frail, thin-appearing.    Neck: Neck is supple without lymphadenopathy.  No carotid bruits.  Lungs: Normal effort, CTA bilaterally, no wheezes, rhonchi, or rales  CV: Regular rate and rhythm. No murmurs, rubs, " "or gallops.  Ext: No clubbing, cyanosis, edema.  Extensive bruising and small, fluid filled blister to the distal aspect of her right forearm.  Extensive bruising to her left lower extremity with a 3 x 4\" fluid filled blister.  No tenderness to palpation.  No signs of infection.     Labs: None.    Assessment & Plan:     88 y.o. female with the following -     1. Non-healing wound of left lower extremity  Acute condition. Patient presents with a new wound to the left lower extremity.  She does not remember any trauma to the area.  She has an area of redness and bruising on the distal medial aspect of her shin.  There is an approximately 3\" x 4\" area which is raised and has the appearance of a blood blister.  She has had one of these prior on her left forearm, which broke open and she had to present to the ER x3 to get the bleeding to stop.  At that time, a thorough workup was done revealing macrocytic anemia and thrombocytopenia.  Patient maintains poor nutritional status and continues to be a daily smoker.  She reports that she tries to drink Ensure shakes, but yesterday she could only have a half of one. Her son-in-law presents to the appointment with her today, as her daughter has recently passed away unexpected from Covid-.  Discussed with patient and her son-in-law, that the last time I saw patient and we discussed her labs, patient did not want to pursue further treatment or workup.  She had been referred to vascular medicine for evaluation of her arterial occlusions found on ultrasound, but patient did not want to pursue any further appointments or treatment at that time.  After further discussion with patient and her son-in-law, they have decided to make an appointment with vascular medicine. She already has an approved referral and she was given the number to call for appointment. Referral to home health for wound care was also placed today.  - Home Wound Care Order    2. Chronic obstructive pulmonary " disease, unspecified COPD type (HCC)  Chronic condition, stable.  Patient continues to feel tired and weak.  She did not keep her appointment with pulmonology in September.  She continues to not want much in the way of treatment.  Her daughter has recently passed away from Covid-19 unexpectedly, as she was vaccinated.  Patient would like a refill of both of her inhalers - which were refilled today.    - albuterol 108 (90 Base) MCG/ACT Aero Soln inhalation aerosol; Inhale 2 Puffs every 6 hours as needed for Shortness of Breath.  Dispense: 1 Each; Refill: 5  - Fluticasone-Umeclidin-Vilant (TRELEGY) 100-62.5-25 MCG/INH AEROSOL POWDER, BREATH ACTIVATED inhalation; Inhale 1 Inhalation every day.  Dispense: 3 Each; Refill: 3      Return in about 4 weeks (around 12/1/2021).    I have placed the below orders and discussed them with an approved delegating provider.  The MA is performing the below orders under the direction of Chioma Mccray MD.    Please note that this dictation was created using voice recognition software. I have made every reasonable attempt to correct obvious errors, but I expect that there are errors of grammar and possibly content that I did not discover before finalizing the note.

## 2021-11-12 ENCOUNTER — TELEPHONE (OUTPATIENT)
Dept: MEDICAL GROUP | Facility: PHYSICIAN GROUP | Age: 86
End: 2021-11-12

## 2021-11-12 DIAGNOSIS — I70.90 ARTERIAL OCCLUSION: ICD-10-CM

## 2021-11-12 DIAGNOSIS — S81.802A NON-HEALING WOUND OF LEFT LOWER EXTREMITY: ICD-10-CM

## 2021-11-12 NOTE — TELEPHONE ENCOUNTER
Spoke to pt on the phone who told me she wants her son in law, Nii Hays, to be on her chart for treatment and billing. Spoke to Bill on the phone and let him know when pt's wound care appointment is scheduled and the location of the appointment.

## 2021-11-12 NOTE — TELEPHONE ENCOUNTER
----- Message from PRADIP Ho sent at 11/12/2021  5:47 AM PST -----  Can you call this patient's son in law please?  I don't know why her wound care referral is going no where, I tried to place it with home health as she is very frail. However, if she can get into the wound care clinic her son in law will take her.  They requested new referral to vascular, which I placed today.  However patient is established with [facility] and our vascular clinic, so they could likely call and get her an appointment there to see her.  New referrals have been placed in either case.  Let me know if I can do anything else for them,   Noreen:)

## 2021-11-18 ENCOUNTER — DOCUMENTATION (OUTPATIENT)
Dept: VASCULAR LAB | Facility: MEDICAL CENTER | Age: 86
End: 2021-11-18

## 2021-11-18 ENCOUNTER — HOME HEALTH ADMISSION (OUTPATIENT)
Dept: HOME HEALTH SERVICES | Facility: HOME HEALTHCARE | Age: 86
End: 2021-11-18
Payer: MEDICARE

## 2021-11-18 ENCOUNTER — OFFICE VISIT (OUTPATIENT)
Dept: WOUND CARE | Facility: MEDICAL CENTER | Age: 86
End: 2021-11-18
Attending: NURSE PRACTITIONER
Payer: MEDICARE

## 2021-11-18 VITALS
TEMPERATURE: 97.6 F | DIASTOLIC BLOOD PRESSURE: 81 MMHG | RESPIRATION RATE: 18 BRPM | OXYGEN SATURATION: 92 % | HEART RATE: 86 BPM | SYSTOLIC BLOOD PRESSURE: 141 MMHG

## 2021-11-18 DIAGNOSIS — S81.802A NON-HEALING WOUND OF LEFT LOWER EXTREMITY: Primary | ICD-10-CM

## 2021-11-18 DIAGNOSIS — R09.89 DECREASED PEDAL PULSES: ICD-10-CM

## 2021-11-18 PROCEDURE — 11042 DBRDMT SUBQ TIS 1ST 20SQCM/<: CPT

## 2021-11-18 PROCEDURE — 11042 DBRDMT SUBQ TIS 1ST 20SQCM/<: CPT | Performed by: NURSE PRACTITIONER

## 2021-11-18 PROCEDURE — 99213 OFFICE O/P EST LOW 20 MIN: CPT | Mod: 25

## 2021-11-18 PROCEDURE — 99214 OFFICE O/P EST MOD 30 MIN: CPT | Mod: 25 | Performed by: NURSE PRACTITIONER

## 2021-11-18 ASSESSMENT — ENCOUNTER SYMPTOMS
WEAKNESS: 0
SHORTNESS OF BREATH: 0
VOMITING: 0
BLURRED VISION: 0
ROS SKIN COMMENTS: WOUND LEFT LEG
NAUSEA: 0
FEVER: 0
CHILLS: 0
DOUBLE VISION: 0
HEADACHES: 0
DIZZINESS: 0
COUGH: 0
WHEEZING: 0

## 2021-11-18 NOTE — PROGRESS NOTES
Arterial ultrasound ordered this visit. Printed order and telephone number ([phone]) to call given to patient son in law. Instructed patient son in law to call and schedule as soon as possible.   APRN to order Home Health. Home health instructions entered into orders in Epic to be faxed to accepting agency once insurance approves home health.

## 2021-11-18 NOTE — PROGRESS NOTES
Provider Encounter- Full Thickness wound    HISTORY OF PRESENT ILLNESS  Wound History:   START OF CARE IN CLINIC: 11/18/2021   REFERRING PROVIDER: Noreen Hugo   WOUND- Full Thickness Wound   LOCATION: Left medial lower extremity   HISTORY: Patient reports that she hit her right wrist causing bruising and a fluid-filled blister.  She does not remember how she obtained the wound to her left medial lower extremity.  Her son-in-law is in the room and does not not know the origins of the wound either.  She did report she developed a fluid-filled blister to the left lower extremity which opened to a full-thickness wound.  Patient has a long history of tobacco use approximately 50+ years.  At her primary care provider visit she did not want to follow-up on the femoral occlusion incidentally found on the venous ultrasound.  She presents to [facility] for care of nonhealing left lower extremity wound.    Past Medical History:   Diagnosis Date   • Aortic aneurysm without rupture ([facility])    • Atherosclerosis of aorta ([facility]) 3/27/2019   • Cataract     +surgery bilat   • Compression fracture of fourth lumbar vertebra ([facility]) 3/28/2018   • Dental disorder 03/20/2018    upper and lower   • Emphysema of lung ([facility])    • Gastroesophageal reflux disease without esophagitis 5/11/2018   • Hiatus hernia syndrome    • Hypothyroidism 2/8/2016   • Mixed hyperlipidemia 12/7/2016   • Protein-calorie malnutrition (HCC) 3/28/2018         TOBACCO USE:   Patient still current everyday smoker 1 pack/day for 50 years      Patient's problem list, allergies, and current medications reviewed and updated in Epic    Interval History:  11/18/2021: Clinic visit with ROBEL Sood.  I reviewed the venous ultrasound with the incidental findings of possible femoral occlusion.  Patient and son-in-law are willing at this time to have a further arterial ultrasound performed.  Patient does have monophasic sounds heard via Doppler to the dorsalis pedis and  posterior tibial arteries of the left foot.  Light debridement performed to remove nonviable slough to the wound bed.  Light compression with Tubigrip applied to left lower extremity.  Home health ordered for once a week dressing change at home.  Patient to follow-up in clinic in 1 to 2 weeks following arterial ultrasound study.      REVIEW OF SYSTEMS:   Review of Systems   Constitutional: Negative for chills and fever.   HENT: Positive for hearing loss.    Eyes: Negative for blurred vision and double vision.   Respiratory: Negative for cough, shortness of breath and wheezing.    Cardiovascular: Positive for leg swelling.   Gastrointestinal: Negative for nausea and vomiting.   Skin: Negative for itching and rash.        Wound left leg   Neurological: Negative for dizziness, weakness and headaches.       PHYSICAL EXAMINATION:   /81   Pulse 86   Temp 36.4 °C (97.6 °F) (Temporal)   Resp 18   LMP 03/03/1970 (Approximate)   SpO2 92%     Physical Exam  Constitutional:       Appearance: Normal appearance.   HENT:      Head: Normocephalic and atraumatic.   Eyes:      Pupils: Pupils are equal, round, and reactive to light.   Cardiovascular:      Comments: Monophasic sounds to DP and PT via Doppler to left lower extremity.  Hemosiderosis bilateral lower extremities  Pulmonary:      Effort: Pulmonary effort is normal. No respiratory distress.      Breath sounds: No wheezing.   Skin:     Comments: Small full-thickness wound to left lower extremity  See doc flowsheets and photos.   Neurological:      Mental Status: She is alert and oriented to person, place, and time.      Comments: Age-related cognitive decline   Psychiatric:         Mood and Affect: Mood normal.         Behavior: Behavior normal.         WOUND ASSESSMENT  Wound 11/18/21 Leg Medial Left (Active)   Wound Image    11/18/21 0939   Site Assessment Red 11/18/21 0939   Periwound Assessment Fragile;Edema;Hemosiderin Staining 11/18/21 0939   Margins Attached  edges 11/18/21 0939   Drainage Amount Moderate 11/18/21 0939   Drainage Description Sanguineous 11/18/21 0939   Treatments Cleansed;Topical Lidocaine;Provider debridement 11/18/21 0939   Wound Cleansing Normal Saline Irrigation 11/18/21 0939   Periwound Protectant Skin Protectant Wipes to Periwound;Barrier Paste 11/18/21 0939   Dressing Cleansing/Solutions Not Applicable 11/18/21 0939   Dressing Options Hydrofera Blue Ready;Silicone Adhesive Foam;Tubigrip 11/18/21 0939   Non-staged Wound Description Full thickness 11/18/21 0939   Wound Length (cm) 1.4 cm 11/18/21 0939   Wound Width (cm) 0.5 cm 11/18/21 0939   Wound Depth (cm) 0.1 cm 11/18/21 0939   Wound Surface Area (cm^2) 0.7 cm^2 11/18/21 0939   Wound Volume (cm^3) 0.07 cm^3 11/18/21 0939   Post-Procedure Length (cm) 1.6 cm 11/18/21 0939   Post-Procedure Width (cm) 0.5 cm 11/18/21 0939   Post-Procedure Depth (cm) 0.2 cm 11/18/21 0939   Post-Procedure Surface Area (cm^2) 0.8 cm^2 11/18/21 0939   Post-Procedure Volume (cm^3) 0.16 cm^3 11/18/21 0939   Tunneling (cm) 0 cm 11/18/21 0939   Undermining (cm) 0 cm 11/18/21 0939   Wound Odor None 11/18/21 0939   Pulses Left;DP;PT;Doppler 11/18/21 0939   Exposed Structures None 11/18/21 0939   Number of days: 0       PROCEDURE:   -2% viscous lidocaine applied topically to wound bed for approximately 5 minutes prior to debridement  -Curette used to debride wound bed.  Excisional debridement was performed to remove devitalized tissue until healthy, bleeding tissue was visualized.   Entire surface of wound, 0.8 cm2 debrided.  Tissue debrided into the subcutaneous layer.    -Bleeding controlled with manual pressure.    -Wound care completed by wound RN, refer to flowsheet  -Patient tolerated the procedure well, without c/o pain or discomfort.       Pertinent Labs and Diagnostics:    Labs:     A1c:   Lab Results   Component Value Date/Time    HBA1C 5.5 03/03/2019 09:22 AM          IMAGING: No results found.    VASCULAR STUDIES:  No results found.    LAST  WOUND CULTURE:   Lab Results   Component Value Date/Time    CULTRSULT Rare growth mixed skin ilya. 12/09/2019 03:26 PM              ASSESSMENT AND PLAN:     1. Decreased pedal pulses  See history above.  -Arterial ultrasound ordered in clinic today  -Patient and son report that they believe they had an appointment with Dr. Dejesus    2. Non-healing wound of left lower extremity  -Excisional debridement of wound in clinic today, medically necessary to promote wound healing.  -Patient to return to clinic weekly for assessment and debridement  -Patient to change dressing 1-2 times per week in between clinic visits  -Home health ordered 1 time a week for assistance with dressing change.   Wound care: Hydrofera Blue ready, silicone adhesive foam, Tubigrip C            PATIENT EDUCATION  - Importance of adequate nutrition for wound healing  -Advised to go to ER for any increased redness, swelling, drainage, or odor, or if patient develops fever, chills, nausea or vomiting.     30 min spent face to face with patient, >50% of time spent counseling, coordinating care, reviewing records, discussing POC, educating patient regarding wound healing and progression.  This time was spent in excess to procedure time.       Please note that this note may have been created using voice recognition software. I have worked with technical experts from [employer] to optimize the interface.  I have made every reasonable attempt to correct obvious errors, but there may be errors of grammar and possibly content that I did not discover before finalizing the note.

## 2021-11-20 ENCOUNTER — HOME CARE VISIT (OUTPATIENT)
Dept: HOME HEALTH SERVICES | Facility: HOME HEALTHCARE | Age: 86
End: 2021-11-20
Payer: MEDICARE

## 2021-11-20 VITALS
TEMPERATURE: 97.6 F | WEIGHT: 85.2 LBS | DIASTOLIC BLOOD PRESSURE: 72 MMHG | OXYGEN SATURATION: 95 % | BODY MASS INDEX: 15.58 KG/M2 | RESPIRATION RATE: 18 BRPM | SYSTOLIC BLOOD PRESSURE: 124 MMHG | HEART RATE: 82 BPM

## 2021-11-20 PROCEDURE — G0493 RN CARE EA 15 MIN HH/HOSPICE: HCPCS

## 2021-11-20 PROCEDURE — 665001 SOC-HOME HEALTH

## 2021-11-20 ASSESSMENT — ENCOUNTER SYMPTOMS
VOMITING: DENIES
PAIN LOCATION - PAIN SEVERITY: 4/10
SHORTNESS OF BREATH: T
PAIN LOCATION - PAIN FREQUENCY: CONSTANT
HIGHEST PAIN SEVERITY IN PAST 24 HOURS: 4/10
PERSON REPORTING PAIN: PATIENT
NAUSEA: DENIES
PAIN LOCATION - PAIN DURATION: ALL THE TIME
PAIN LOCATION - PAIN QUALITY: ACHY
SUBJECTIVE PAIN PROGRESSION: UNCHANGED
PAIN: 1
PAIN LOCATION: BACK
PAIN LOCATION - RELIEVING FACTORS: REST, TYLENOL
PAIN SEVERITY GOAL: 4/10
LOWEST PAIN SEVERITY IN PAST 24 HOURS: 4/10

## 2021-11-20 ASSESSMENT — PATIENT HEALTH QUESTIONNAIRE - PHQ9
1. LITTLE INTEREST OR PLEASURE IN DOING THINGS: 00
CLINICAL INTERPRETATION OF PHQ2 SCORE: 0
2. FEELING DOWN, DEPRESSED, IRRITABLE, OR HOPELESS: 00

## 2021-11-20 ASSESSMENT — FIBROSIS 4 INDEX: FIB4 SCORE: 3.07

## 2021-11-20 NOTE — CASE COMMUNICATION
Primary dx/Skilled need: LLE wound (also going to the wound care center), COPD  SN frequency: 1w9 3 PRN visits for dressing failure/replacement or complaints of increased pain, redness or drainage.      Zip code: [address]  Disciplines ordered: MARYSE for help with advanced directive  Insurance & authorization: [employer]  Certification period:11/20/21-1/18/21  Special considerations: Please call pt's son in law Bill to schedule

## 2021-11-21 DIAGNOSIS — J44.9 CHRONIC OBSTRUCTIVE PULMONARY DISEASE, UNSPECIFIED COPD TYPE (HCC): ICD-10-CM

## 2021-11-22 ENCOUNTER — DOCUMENTATION (OUTPATIENT)
Dept: MEDICAL GROUP | Facility: PHYSICIAN GROUP | Age: 86
End: 2021-11-22

## 2021-11-22 ENCOUNTER — HOME CARE VISIT (OUTPATIENT)
Dept: HOME HEALTH SERVICES | Facility: HOME HEALTHCARE | Age: 86
End: 2021-11-22
Payer: MEDICARE

## 2021-11-22 RX ORDER — ALBUTEROL SULFATE 90 UG/1
AEROSOL, METERED RESPIRATORY (INHALATION)
Qty: 6.7 EACH | Refills: 5 | Status: SHIPPED | OUTPATIENT
Start: 2021-11-22

## 2021-11-22 NOTE — PROGRESS NOTES
Medication chart review for [facility] services    PCP:  PRADIP Ho  [address] [address]  Fax: [phone]    Current medication list     Current Outpatient Medications:   •  Vitamin D3, 1 Capsule, Oral, DAILY  •  polyethylene glycol 3350, 17 g, Oral, QDAY PRN  •  acetaminophen, 1,000 mg, Oral, Q6HRS PRN  •  albuterol, 2 Puff, Inhalation, Q6HRS PRN  •  Fluticasone-Umeclidin-Vilant, 1 Inhalation, Inhalation, DAILY  •  rosuvastatin, 40 mg, Oral, DAILY  •  levothyroxine, 125 mcg, Oral, AM ES  •  metoprolol SR, 50 mg, Oral, DAILY  •  lisinopril, 10 mg, Oral, DAILY  •  alendronate, 70 mg, Oral, Q7 DAYS (Patient not taking: Reported on 11/20/2021)    No Known Allergies    Labs     Lab Results   Component Value Date/Time    SODIUM 139 05/21/2021 10:06 AM    SODIUM 138 05/21/2021 10:06 AM    POTASSIUM 4.6 05/21/2021 10:06 AM    POTASSIUM 4.7 05/21/2021 10:06 AM    CHLORIDE 104 05/21/2021 10:06 AM    CHLORIDE 103 05/21/2021 10:06 AM    CO2 23 05/21/2021 10:06 AM    CO2 23 05/21/2021 10:06 AM    GLUCOSE 85 05/21/2021 10:06 AM    GLUCOSE 85 05/21/2021 10:06 AM    BUN 17 05/21/2021 10:06 AM    BUN 17 05/21/2021 10:06 AM    CREATININE 0.79 05/21/2021 10:06 AM    CREATININE 0.81 05/21/2021 10:06 AM     Lab Results   Component Value Date/Time    ALKPHOSPHAT 84 05/21/2021 10:06 AM    ALKPHOSPHAT 85 05/21/2021 10:06 AM    ASTSGOT 22 05/21/2021 10:06 AM    ASTSGOT 21 05/21/2021 10:06 AM    ALTSGPT 18 05/21/2021 10:06 AM    ALTSGPT 17 05/21/2021 10:06 AM    TBILIRUBIN 0.6 05/21/2021 10:06 AM    TBILIRUBIN 0.6 05/21/2021 10:06 AM    INR 1.17 (H) 05/21/2021 10:06 AM    ALBUMIN 4.3 05/21/2021 10:06 AM    ALBUMIN 4.3 05/21/2021 10:06 AM        Assessment and Plan:   • Received referral from [facility]. Medications reviewed.       Lexa Umanzor, PharmD, MS, BCACP, LCC  [facility]  Phone [phone] fax [phone]    This note was created using voice  recognition software ([employer]). The accuracy of the dictation is limited by the abilities of the software. I have reviewed the note prior to signing, however some errors in grammar and context are still possible. If you have any questions related to this note please do not hesitate to contact our office.

## 2021-11-23 ENCOUNTER — HOME CARE VISIT (OUTPATIENT)
Dept: HOME HEALTH SERVICES | Facility: HOME HEALTHCARE | Age: 86
End: 2021-11-23
Payer: MEDICARE

## 2021-11-23 ENCOUNTER — OFFICE VISIT (OUTPATIENT)
Dept: WOUND CARE | Facility: MEDICAL CENTER | Age: 86
End: 2021-11-23
Attending: NURSE PRACTITIONER
Payer: MEDICARE

## 2021-11-23 VITALS
SYSTOLIC BLOOD PRESSURE: 137 MMHG | TEMPERATURE: 96.6 F | OXYGEN SATURATION: 97 % | DIASTOLIC BLOOD PRESSURE: 96 MMHG | RESPIRATION RATE: 20 BRPM | HEART RATE: 100 BPM

## 2021-11-23 DIAGNOSIS — R09.89 DECREASED PEDAL PULSES: ICD-10-CM

## 2021-11-23 DIAGNOSIS — S81.802A NON-HEALING WOUND OF LEFT LOWER EXTREMITY: Primary | ICD-10-CM

## 2021-11-23 PROCEDURE — 99213 OFFICE O/P EST LOW 20 MIN: CPT | Mod: 25 | Performed by: NURSE PRACTITIONER

## 2021-11-23 PROCEDURE — G0155 HHCP-SVS OF CSW,EA 15 MIN: HCPCS

## 2021-11-23 PROCEDURE — 99213 OFFICE O/P EST LOW 20 MIN: CPT | Mod: 25

## 2021-11-23 PROCEDURE — 11042 DBRDMT SUBQ TIS 1ST 20SQCM/<: CPT

## 2021-11-23 PROCEDURE — 11042 DBRDMT SUBQ TIS 1ST 20SQCM/<: CPT | Performed by: NURSE PRACTITIONER

## 2021-11-23 ASSESSMENT — ENCOUNTER SYMPTOMS
WEAKNESS: 0
CHILLS: 0
WHEEZING: 0
VOMITING: 0
DIZZINESS: 0
HEADACHES: 0
SHORTNESS OF BREATH: 0
ROS SKIN COMMENTS: WOUND LEFT LEG
DOUBLE VISION: 0
FEVER: 0
BLURRED VISION: 0
COUGH: 0
NAUSEA: 0

## 2021-11-23 NOTE — PROGRESS NOTES
Provider Encounter- Full Thickness wound    HISTORY OF PRESENT ILLNESS  Wound History:   START OF CARE IN CLINIC: 11/18/2021   REFERRING PROVIDER: Noreen Hugo   WOUND- Full Thickness Wound   LOCATION: Left medial lower extremity   HISTORY: Patient reports that she hit her right wrist causing bruising and a fluid-filled blister.  She does not remember how she obtained the wound to her left medial lower extremity.  Her son-in-law is in the room and does not not know the origins of the wound either.  She did report she developed a fluid-filled blister to the left lower extremity which opened to a full-thickness wound.  Patient has a long history of tobacco use approximately 50+ years.  At her primary care provider visit she did not want to follow-up on the femoral occlusion incidentally found on the venous ultrasound.  She presents to [facility] for care of nonhealing left lower extremity wound.    Past Medical History:   Diagnosis Date   • Aortic aneurysm without rupture ([facility])    • Atherosclerosis of aorta ([facility]) 3/27/2019   • Cataract     +surgery bilat   • Compression fracture of fourth lumbar vertebra ([facility]) 3/28/2018   • Dental disorder 03/20/2018    upper and lower   • Emphysema of lung ([facility])    • Gastroesophageal reflux disease without esophagitis 5/11/2018   • Hiatus hernia syndrome    • Hypothyroidism 2/8/2016   • Mixed hyperlipidemia 12/7/2016   • Protein-calorie malnutrition (HCC) 3/28/2018         TOBACCO USE:   Patient still current everyday smoker 1 pack/day for 50 years      Patient's problem list, allergies, and current medications reviewed and updated in Epic    Interval History:  11/23/2021: Clinic visit with ROBEL Sood.  I reviewed the venous ultrasound with the incidental findings of possible femoral occlusion.  Patient and son-in-law are willing at this time to have a further arterial ultrasound performed.  Patient does have monophasic sounds heard via Doppler to the dorsalis pedis and  posterior tibial arteries of the left foot.  Light debridement performed to remove nonviable slough to the wound bed.  Light compression with Tubigrip applied to left lower extremity.  Home health ordered for once a week dressing change at home.  Patient to follow-up in clinic in 1 to 2 weeks following arterial ultrasound study.    11/23/2021: Clinic visit with ROBEL Sood.  Talked with the patient and her son in clinic today.  Patient has not completed her arterial ultrasound.  We have given the patient's son the number to contact to schedule the ultrasound.  Patient's wound is progressing with granulation tissue to the wound bed.  There is no signs or symptoms of infection.  Continue current plan of care we will add collagen to the plan.    REVIEW OF SYSTEMS:   Review of Systems   Constitutional: Negative for chills and fever.   HENT: Positive for hearing loss.    Eyes: Negative for blurred vision and double vision.   Respiratory: Negative for cough, shortness of breath and wheezing.    Cardiovascular: Positive for leg swelling.   Gastrointestinal: Negative for nausea and vomiting.   Skin: Negative for itching and rash.        Wound left leg   Neurological: Negative for dizziness, weakness and headaches.       PHYSICAL EXAMINATION:   /96 (BP Location: Left arm, Patient Position: Sitting)   Pulse 100   Temp 35.9 °C (96.6 °F) (Temporal)   Resp 20   LMP 03/03/1970 (Approximate)   SpO2 97%     Physical Exam  Constitutional:       Appearance: Normal appearance.   HENT:      Head: Normocephalic and atraumatic.   Eyes:      Pupils: Pupils are equal, round, and reactive to light.   Cardiovascular:      Comments: Monophasic sounds to DP and PT via Doppler to left lower extremity.  Hemosiderosis bilateral lower extremities  Pulmonary:      Effort: Pulmonary effort is normal. No respiratory distress.      Breath sounds: No wheezing.   Skin:     Comments: Small full-thickness wound to left lower  extremity  See doc flowsheets and photos.   Neurological:      Mental Status: She is alert and oriented to person, place, and time.      Comments: Age-related cognitive decline   Psychiatric:         Mood and Affect: Mood normal.         Behavior: Behavior normal.         WOUND ASSESSMENT  Wound 11/18/21 Leg Medial Left (Active)   Wound Image    11/23/21 0800   Site Assessment Red 11/23/21 0800   Periwound Assessment Fragile;Edema;Hemosiderin Staining 11/23/21 0800   Margins Attached edges 11/23/21 0800   Drainage Amount Small 11/23/21 0800   Drainage Description Serosanguineous 11/23/21 0800   Treatments Cleansed;Topical Lidocaine;Provider debridement 11/23/21 0800   Wound Cleansing Puracyn Bellflower 11/23/21 0800   Periwound Protectant Skin Moisturizer;Barrier Paste 11/23/21 0800   Dressing Cleansing/Solutions Not Applicable 11/23/21 0800   Dressing Options Collagen Dressing;Hydrofera Blue Ready;Silicone Adhesive Foam;Tubigrip 11/23/21 0800   Non-staged Wound Description Full thickness 11/23/21 0800   Wound Length (cm) 1 cm 11/23/21 0800   Wound Width (cm) 0.2 cm 11/23/21 0800   Wound Depth (cm) 0.1 cm 11/23/21 0800   Wound Surface Area (cm^2) 0.2 cm^2 11/23/21 0800   Wound Volume (cm^3) 0.02 cm^3 11/23/21 0800   Post-Procedure Length (cm) 1.1 cm 11/23/21 0800   Post-Procedure Width (cm) 0.2 cm 11/23/21 0800   Post-Procedure Depth (cm) 0.1 cm 11/23/21 0800   Post-Procedure Surface Area (cm^2) 0.22 cm^2 11/23/21 0800   Post-Procedure Volume (cm^3) 0.022 cm^3 11/23/21 0800   Wound Healing % 71 11/23/21 0800   Tunneling (cm) 0 cm 11/23/21 0800   Undermining (cm) 0 cm 11/23/21 0800   Wound Odor None 11/23/21 0800   Pulses Left;DP;PT;Doppler 11/18/21 0939   Exposed Structures None 11/23/21 0800   Number of days: 5       PROCEDURE:   -2% viscous lidocaine applied topically to wound bed for approximately 5 minutes prior to debridement  -Curette used to debride wound bed.  Excisional debridement was performed to remove  devitalized tissue until healthy, bleeding tissue was visualized.   Entire surface of wound, 0.22 cm2 debrided.  Tissue debrided into the subcutaneous layer.    -Bleeding controlled with manual pressure.    -Wound care completed by wound RN, refer to flowsheet  -Patient tolerated the procedure well, without c/o pain or discomfort.       Pertinent Labs and Diagnostics:    Labs:     A1c:   Lab Results   Component Value Date/Time    HBA1C 5.5 03/03/2019 09:22 AM          IMAGING: No results found.    VASCULAR STUDIES: No results found.    LAST  WOUND CULTURE:   Lab Results   Component Value Date/Time    CULTRSULT Rare growth mixed skin ilya. 12/09/2019 03:26 PM              ASSESSMENT AND PLAN:     1. Decreased pedal pulses  See history above.  -Still awaiting patient to complete arterial ultrasound.  -We will refer to vascular if appropriate following ultrasound results.  If patient will accept referral.    2. Non-healing wound of left lower extremity  -Excisional debridement (light) of wound in clinic today, medically necessary to promote wound healing.  -Patient to return to clinic weekly for assessment and debridement  -Patient to change dressing 1-2 times per week in between clinic visits  -Home health ordered 1 time a week for assistance with dressing change.   Wound care: Collagen, Hydrofera Blue ready, silicone adhesive foam, Tubigrip C            PATIENT EDUCATION  - Importance of adequate nutrition for wound healing  -Advised to go to ER for any increased redness, swelling, drainage, or odor, or if patient develops fever, chills, nausea or vomiting.     20 min spent face to face with patient, >50% of time spent counseling, coordinating care, reviewing records, discussing POC, educating patient regarding wound healing and progression.  This time was spent in excess to procedure time.       Please note that this note may have been created using voice recognition software. I have worked with technical experts  from [facility] to optimize the interface.  I have made every reasonable attempt to correct obvious errors, but there may be errors of grammar and possibly content that I did not discover before finalizing the note.

## 2021-11-23 NOTE — NON-PROVIDER
Updated wound care orders sent via [facility] to [facility].    Pt and Son corin bang forgot to schedule Arterial Study phone number [phone] written down for Pt.

## 2021-11-23 NOTE — PATIENT INSTRUCTIONS
-Keep your wound dressing clean, dry, and intact.    -Change your dressing if it becomes soiled, soaked, or falls off.    -Should you experience any significant changes in your wound(s), such as infection (redness, swelling, localized heat, increased pain, fever > 101 F, chills) or have any questions regarding your home care instructions, please contact the wound center at [phone]. If after hours, contact your primary care physician or go to the hospital emergency room.

## 2021-11-27 ENCOUNTER — HOME CARE VISIT (OUTPATIENT)
Dept: HOME HEALTH SERVICES | Facility: HOME HEALTHCARE | Age: 86
End: 2021-11-27
Payer: MEDICARE

## 2021-11-27 VITALS
TEMPERATURE: 98.6 F | DIASTOLIC BLOOD PRESSURE: 62 MMHG | HEART RATE: 78 BPM | OXYGEN SATURATION: 96 % | SYSTOLIC BLOOD PRESSURE: 108 MMHG | RESPIRATION RATE: 18 BRPM

## 2021-11-27 PROCEDURE — G0299 HHS/HOSPICE OF RN EA 15 MIN: HCPCS

## 2021-11-27 ASSESSMENT — ENCOUNTER SYMPTOMS
HIGHEST PAIN SEVERITY IN PAST 24 HOURS: 5/10
PAIN SEVERITY GOAL: 0/10
LOWEST PAIN SEVERITY IN PAST 24 HOURS: 0/10
PAIN LOCATION - PAIN SEVERITY: 1/10
PAIN LOCATION - PAIN QUALITY: ACHE
PAIN LOCATION - EXACERBATING FACTORS: ACTIVITY
ARTHRALGIAS: 1
MUSCLE WEAKNESS: 1
DENIES PAIN: 1
PAIN LOCATION - RELIEVING FACTORS: REST, TYLENOL
PAIN LOCATION: BACK
PAIN LOCATION - PAIN FREQUENCY: INTERMITTENT

## 2021-11-29 ENCOUNTER — HOME CARE VISIT (OUTPATIENT)
Dept: HOME HEALTH SERVICES | Facility: HOME HEALTHCARE | Age: 86
End: 2021-11-29
Payer: MEDICARE

## 2021-11-29 ASSESSMENT — ACTIVITIES OF DAILY LIVING (ADL): OASIS_M1830: 03

## 2021-11-30 ENCOUNTER — TELEPHONE (OUTPATIENT)
Dept: MEDICAL GROUP | Facility: PHYSICIAN GROUP | Age: 86
End: 2021-11-30

## 2021-11-30 NOTE — TELEPHONE ENCOUNTER
"· RTC ACCESS paperwork received from Pt son dropped off requiring provider signature.     · All appropriate fields completed by Medical Assistant: Yes    · Paperwork placed in \"MA to Provider\" folder/basket. Awaiting provider completion/signature.    "

## 2021-12-01 ENCOUNTER — OFFICE VISIT (OUTPATIENT)
Dept: WOUND CARE | Facility: MEDICAL CENTER | Age: 86
End: 2021-12-01
Attending: NURSE PRACTITIONER
Payer: MEDICARE

## 2021-12-01 VITALS
RESPIRATION RATE: 16 BRPM | OXYGEN SATURATION: 93 % | TEMPERATURE: 97.5 F | HEART RATE: 84 BPM | DIASTOLIC BLOOD PRESSURE: 91 MMHG | SYSTOLIC BLOOD PRESSURE: 145 MMHG

## 2021-12-01 DIAGNOSIS — R23.4 SCAB: ICD-10-CM

## 2021-12-01 DIAGNOSIS — S20.429A: ICD-10-CM

## 2021-12-01 DIAGNOSIS — S81.802A NON-HEALING WOUND OF LEFT LOWER EXTREMITY: Primary | ICD-10-CM

## 2021-12-01 DIAGNOSIS — R09.89 DECREASED PEDAL PULSES: ICD-10-CM

## 2021-12-01 DIAGNOSIS — S30.0XXA TRAUMATIC ECCHYMOSIS OF LOWER BACK, INITIAL ENCOUNTER: ICD-10-CM

## 2021-12-01 DIAGNOSIS — Z48.00 ENCOUNTER FOR CHANGE OF DRESSING: ICD-10-CM

## 2021-12-01 PROCEDURE — G0180 MD CERTIFICATION HHA PATIENT: HCPCS | Performed by: NURSE PRACTITIONER

## 2021-12-01 PROCEDURE — 99212 OFFICE O/P EST SF 10 MIN: CPT

## 2021-12-01 PROCEDURE — 99213 OFFICE O/P EST LOW 20 MIN: CPT | Performed by: NURSE PRACTITIONER

## 2021-12-01 ASSESSMENT — ENCOUNTER SYMPTOMS
SHORTNESS OF BREATH: 0
DOUBLE VISION: 0
COUGH: 0
WHEEZING: 0
FEVER: 0
CHILLS: 0
DIZZINESS: 0
WEAKNESS: 0
BLURRED VISION: 0
HEADACHES: 0
NAUSEA: 0
VOMITING: 0

## 2021-12-01 NOTE — TELEPHONE ENCOUNTER
· RTC ACCESS paperwork received from Pt son  requiring provider signature.     · All appropriate fields completed by Medical Assistant: Yes    · Paperwork handed to provider to sign then scanned to patient's chart and placed in front office for pt to             Informed the pt son that the paperwork has been completed and the paperwork can be picked up in our front office.

## 2021-12-01 NOTE — PROGRESS NOTES
Wound care order routed to [facility] via [facility] system.

## 2021-12-01 NOTE — PROGRESS NOTES
Provider Encounter- Full Thickness wound    HISTORY OF PRESENT ILLNESS  Wound History:   START OF CARE IN CLINIC: 11/18/2021   REFERRING PROVIDER: Noreen Hugo   WOUND- Full Thickness Wound   LOCATION: Left medial lower extremity   HISTORY: Patient reports that she hit her right wrist causing bruising and a fluid-filled blister.  She does not remember how she obtained the wound to her left medial lower extremity.  Her son-in-law is in the room and does not not know the origins of the wound either.  She did report she developed a fluid-filled blister to the left lower extremity which opened to a full-thickness wound.  Patient has a long history of tobacco use approximately 50+ years.  At her primary care provider visit she did not want to follow-up on the femoral occlusion incidentally found on the venous ultrasound.  She presents to [facility] for care of nonhealing left lower extremity wound.    Past Medical History:   Diagnosis Date   • Aortic aneurysm without rupture ([facility])    • Atherosclerosis of aorta ([facility]) 3/27/2019   • Cataract     +surgery bilat   • Compression fracture of fourth lumbar vertebra ([facility]) 3/28/2018   • Dental disorder 03/20/2018    upper and lower   • Emphysema of lung ([facility])    • Gastroesophageal reflux disease without esophagitis 5/11/2018   • Hiatus hernia syndrome    • Hypothyroidism 2/8/2016   • Mixed hyperlipidemia 12/7/2016   • Protein-calorie malnutrition ([facility]) 3/28/2018         TOBACCO USE:   Patient still current everyday smoker 1 pack/day for 50 years      Patient's problem list, allergies, and current medications reviewed and updated in Epic    Interval History:  11/23/2021: Clinic visit with ROBEL Sood.  I reviewed the venous ultrasound with the incidental findings of possible femoral occlusion.  Patient and son-in-law are willing at this time to have a further arterial ultrasound performed.  Patient does have monophasic sounds heard via Doppler to the dorsalis pedis and  posterior tibial arteries of the left foot.  Light debridement performed to remove nonviable slough to the wound bed.  Light compression with Tubigrip applied to left lower extremity.  Home health ordered for once a week dressing change at home.  Patient to follow-up in clinic in 1 to 2 weeks following arterial ultrasound study.    11/23/2021: Clinic visit with ROBEL Sood.  Talked with the patient and her son in clinic today.  Patient has not completed her arterial ultrasound.  We have given the patient's son the number to contact to schedule the ultrasound.  Patient's wound is progressing with granulation tissue to the wound bed.  There is no signs or symptoms of infection.  Continue current plan of care we will add collagen to the plan.    12/1/2021: Clinic visit with ROBEL Sood.  Patient's left medial leg wound is superficial with a thin scab over the wound bed.  No excisional debridement recommended at this time.  Wound is superficial in nature with a small scab.  The scab will fall off in the next few weeks.  There are is no need for the patient to be seen at the [facility].  Of note the patient has a large bruise to her left flank.  Patient denies pain to this area or her left rib cage with palpitation.  There does not appear to be a significant collection of blood.  Patient is not on blood thinners at this time.  Patient denies any recent falls or accidents.  She denies bumping into anything.  Relative in room denies seeing the patient fall.  I offered to order an x-ray to ensure that there were no fractures of the patient's ribs.  Patient refused x-ray at this time.  Patient does have home health at this time.  Therefore she no longer needs our services [facility] with expected resolution of the wound within the next week or 2.  Patient will be discharged to [facility] at this time.    REVIEW OF SYSTEMS:   Review of Systems   Constitutional: Negative for chills and fever.   HENT: Positive for hearing  loss.    Eyes: Negative for blurred vision and double vision.   Respiratory: Negative for cough, shortness of breath and wheezing.    Cardiovascular: Positive for leg swelling.   Gastrointestinal: Negative for nausea and vomiting.   Skin: Negative for itching and rash.        Wound left leg  Significant bruising left flank   Neurological: Negative for dizziness, weakness and headaches.       PHYSICAL EXAMINATION:   /91   Pulse 84   Temp 36.4 °C (97.5 °F)   Resp 16   LMP 03/03/1970 (Approximate)   SpO2 93%     Physical Exam  Constitutional:       Appearance: Normal appearance.   HENT:      Head: Normocephalic and atraumatic.   Eyes:      Pupils: Pupils are equal, round, and reactive to light.   Cardiovascular:      Comments: Monophasic sounds to DP and PT via Doppler to left lower extremity.  Hemosiderosis bilateral lower extremities  Pulmonary:      Effort: Pulmonary effort is normal. No respiratory distress.      Breath sounds: No wheezing.   Skin:     Comments: Small full-thickness wound to left lower extremity superficial and scabbed over  Large area of bruising left flank   See doc flowsheets and photos.   Neurological:      Mental Status: She is alert and oriented to person, place, and time.      Comments: Age-related cognitive decline   Psychiatric:         Mood and Affect: Mood normal.         Behavior: Behavior normal.         WOUND ASSESSMENT  Wound 11/18/21 Leg Medial Left (Active)   Wound Image   12/01/21 1100   Site Assessment Dry;Brown 12/01/21 1100   Periwound Assessment Fragile 12/01/21 1100   Margins Attached edges 12/01/21 1100   Drainage Amount None 12/01/21 1100   Drainage Description Serosanguineous 11/23/21 0800   Treatments Cleansed 12/01/21 1100   Wound Cleansing Foam Cleanser/Washcloth 12/01/21 1100   Periwound Protectant Skin Protectant Wipes to Periwound 12/01/21 1100   Dressing Cleansing/Solutions Not Applicable 12/01/21 1100   Dressing Options Silicone Adhesive Foam 12/01/21  1100   Dressing Changed New 12/01/21 1100   Non-staged Wound Description Not applicable 12/01/21 1100   Wound Length (cm) 1 cm 11/23/21 0800   Wound Width (cm) 0.2 cm 11/23/21 0800   Wound Depth (cm) 0.1 cm 11/23/21 0800   Wound Surface Area (cm^2) 0.2 cm^2 11/23/21 0800   Wound Volume (cm^3) 0.02 cm^3 11/23/21 0800   Post-Procedure Length (cm) 1.1 cm 11/23/21 0800   Post-Procedure Width (cm) 0.2 cm 11/23/21 0800   Post-Procedure Depth (cm) 0.1 cm 11/23/21 0800   Post-Procedure Surface Area (cm^2) 0.22 cm^2 11/23/21 0800   Post-Procedure Volume (cm^3) 0.022 cm^3 11/23/21 0800   Wound Healing % 71 11/23/21 0800   Tunneling (cm) 0 cm 11/23/21 0800   Undermining (cm) 0 cm 11/23/21 0800   Wound Odor None 12/01/21 1100   Pulses Left;DP;PT;Doppler 11/18/21 0939   Exposed Structures SAHARA 12/01/21 1100   Number of days: 13       PROCEDURE:   No excisional debridement required in clinic today.      Pertinent Labs and Diagnostics:    Labs:     A1c:   Lab Results   Component Value Date/Time    HBA1C 5.5 03/03/2019 09:22 AM          IMAGING: No results found.    VASCULAR STUDIES: No results found.    LAST  WOUND CULTURE:   Lab Results   Component Value Date/Time    CULTRSULT Rare growth mixed skin ilya. 12/09/2019 03:26 PM              ASSESSMENT AND PLAN:     1. Decreased pedal pulses  See history above.  -Patient has still not completed arterial ultrasound.  Relative in the room says she has it scheduled for 12/2/2021.  If results come to my inbox I will review and call the patient discuss results and refer if needed.      2. Non-healing wound of left lower extremity  -No excisional debridement required.  Patient has a thin scab over superficial wound patient be discharged in [facility] at this time.  -Home health to change the dressing for approximately 1 more week.  Home health to assess bruising.  Patient instructed to go directly to the ER if the bruising increases or she has an increase in pain, edema, fever, chills.   Wound  care: Silicone adhesive foam            PATIENT EDUCATION  - Importance of adequate nutrition for wound healing  -Advised to go to ER for any increased redness, swelling, drainage, or odor, or if patient develops fever, chills, nausea or vomiting.     20 min spent face to face with patient, >50% of time spent counseling, coordinating care, reviewing records, discussing POC, educating patient regarding wound healing and progression.  This time was spent in excess to procedure time.       Please note that this note may have been created using voice recognition software. I have worked with technical experts from [employer] to optimize the interface.  I have made every reasonable attempt to correct obvious errors, but there may be errors of grammar and possibly content that I did not discover before finalizing the note.

## 2021-12-02 ENCOUNTER — HOSPITAL ENCOUNTER (OUTPATIENT)
Dept: RADIOLOGY | Facility: MEDICAL CENTER | Age: 86
End: 2021-12-02
Attending: NURSE PRACTITIONER
Payer: MEDICARE

## 2021-12-02 DIAGNOSIS — R09.89 DECREASED PEDAL PULSES: ICD-10-CM

## 2021-12-02 PROCEDURE — 93922 UPR/L XTREMITY ART 2 LEVELS: CPT

## 2021-12-02 PROCEDURE — 93925 LOWER EXTREMITY STUDY: CPT

## 2021-12-02 PROCEDURE — 93925 LOWER EXTREMITY STUDY: CPT | Mod: 26 | Performed by: INTERNAL MEDICINE

## 2021-12-02 PROCEDURE — 93922 UPR/L XTREMITY ART 2 LEVELS: CPT | Mod: 26 | Performed by: INTERNAL MEDICINE

## 2021-12-04 ENCOUNTER — HOME CARE VISIT (OUTPATIENT)
Dept: HOME HEALTH SERVICES | Facility: HOME HEALTHCARE | Age: 86
End: 2021-12-04
Payer: MEDICARE

## 2021-12-04 VITALS
RESPIRATION RATE: 18 BRPM | TEMPERATURE: 99 F | OXYGEN SATURATION: 95 % | SYSTOLIC BLOOD PRESSURE: 112 MMHG | HEART RATE: 76 BPM | DIASTOLIC BLOOD PRESSURE: 68 MMHG

## 2021-12-04 PROCEDURE — G0299 HHS/HOSPICE OF RN EA 15 MIN: HCPCS

## 2021-12-04 ASSESSMENT — ENCOUNTER SYMPTOMS
MUSCLE WEAKNESS: 1
DENIES PAIN: 1
SHORTNESS OF BREATH: T

## 2021-12-04 NOTE — Clinical Note
FYI- the wound orders are for a dressing change two times a week but there is only one visit scheduled per week. Not sure if you want to change the dressing orders or the number of visits.     FOR INFO ONLY    History pertinent to today's visit: COPD, wound, afib.   Skilled need: health assessment, wound care, disease process education.   Pt/Cg response to the services provided: Pt alert and oriented during visit. Pt is HH and necessary to repeat statements/questions frequently. Vitals taken and all within HH parameters. Pt denies any pain, states no falls or changes in medication. Wound care provided per POC. Pt tolerated procedure with no complaints. Left leg wound is scabbed with no drainage and no s/s of infection. Back wound is red with granulation, scant drainage, and no s/s of infection. Periwound skin was very dry and flaky. Education provided on using lotion consistently to keep skin healthy and intact to avoid further wounds. Pt stated understanding and stated she would use lotion. Pt knows s/s to watch for and she knows she can call HH at anytime with questions or concerns.  Plan for the next visit: health assessment, wound care, disease process education.   Case communication: SORAYA

## 2021-12-06 ENCOUNTER — APPOINTMENT (OUTPATIENT)
Dept: WOUND CARE | Facility: MEDICAL CENTER | Age: 86
End: 2021-12-06
Attending: NURSE PRACTITIONER
Payer: MEDICARE

## 2021-12-06 DIAGNOSIS — I73.9 PERIPHERAL ARTERY DISEASE (HCC): ICD-10-CM

## 2021-12-07 NOTE — PROGRESS NOTES
I called and left a message with the patient's son-in-law who has been bringing the patient to all of her clinical appointments regarding her arterial ultrasound results.  I have also messaged the patient's PCP regarding the results and that I have referred the patient to vascular.

## 2021-12-07 NOTE — CASE COMMUNICATION
she was seen at the wound clinic and weekly for now is adequate,will see her this week and will adjust visit as needed, Thank you  ----- Message -----  From: Alecia Joshua R.N.  Sent: 12/4/2021   1:41 PM PST  To: CHRISTOPHER Kenyon- the wound orders are for a dressing change two times a week but there is only one visit scheduled per week. Not sure if you want to change the dressing orders or the number of visits.     FOR  INFO ONLY    History pertinent to today's visit: COPD, wound, afib.   Skilled need: health assessment, wound care, disease process education.   Pt/Cg response to the services provided: Pt alert and oriented during visit. Pt is HH and necessary to repeat statements/questions frequently. Vitals taken and all within HH parameters. Pt denies any pain, states no falls or changes in medication. Wound care provided per POC. Pt tolerated proced ure with no complaints. Left leg wound is scabbed with no drainage and no s/s of infection. Back wound is red with granulation, scant drainage, and no s/s of infection. Periwound skin was very dry and flaky. Education provided on using lotion consistently to keep skin healthy and intact to avoid further wounds. Pt stated understanding and stated she would use lotion. Pt knows s/s to watch for and she knows she can call HH at anytime wit h questions or concerns.  Plan for the next visit: health assessment, wound care, disease process education.   Case communication: SORAYA

## 2021-12-09 ENCOUNTER — HOME CARE VISIT (OUTPATIENT)
Dept: HOME HEALTH SERVICES | Facility: HOME HEALTHCARE | Age: 86
End: 2021-12-09
Payer: MEDICARE

## 2021-12-09 VITALS
DIASTOLIC BLOOD PRESSURE: 50 MMHG | SYSTOLIC BLOOD PRESSURE: 110 MMHG | BODY MASS INDEX: 15.58 KG/M2 | RESPIRATION RATE: 18 BRPM | HEIGHT: 62 IN | OXYGEN SATURATION: 93 % | HEART RATE: 75 BPM | TEMPERATURE: 98.6 F

## 2021-12-09 PROCEDURE — G0299 HHS/HOSPICE OF RN EA 15 MIN: HCPCS

## 2021-12-09 ASSESSMENT — ENCOUNTER SYMPTOMS
NAUSEA: NO
VOMITING: NO
MUSCLE WEAKNESS: 1
PERSON REPORTING PAIN: PATIENT
DENIES PAIN: 1

## 2021-12-13 ENCOUNTER — HOME CARE VISIT (OUTPATIENT)
Dept: HOME HEALTH SERVICES | Facility: HOME HEALTHCARE | Age: 86
End: 2021-12-13
Payer: MEDICARE

## 2021-12-13 VITALS
HEART RATE: 64 BPM | SYSTOLIC BLOOD PRESSURE: 116 MMHG | OXYGEN SATURATION: 93 % | DIASTOLIC BLOOD PRESSURE: 70 MMHG | RESPIRATION RATE: 18 BRPM | TEMPERATURE: 99.2 F

## 2021-12-13 PROCEDURE — G0299 HHS/HOSPICE OF RN EA 15 MIN: HCPCS

## 2021-12-13 ASSESSMENT — ENCOUNTER SYMPTOMS
PERSON REPORTING PAIN: PATIENT
DENIES PAIN: 1
MUSCLE WEAKNESS: 1

## 2021-12-20 ENCOUNTER — HOME CARE VISIT (OUTPATIENT)
Dept: HOME HEALTH SERVICES | Facility: HOME HEALTHCARE | Age: 86
End: 2021-12-20
Payer: MEDICARE

## 2021-12-20 VITALS
HEIGHT: 62 IN | RESPIRATION RATE: 18 BRPM | TEMPERATURE: 98.6 F | OXYGEN SATURATION: 92 % | SYSTOLIC BLOOD PRESSURE: 122 MMHG | HEART RATE: 72 BPM | DIASTOLIC BLOOD PRESSURE: 70 MMHG | BODY MASS INDEX: 15.58 KG/M2

## 2021-12-20 PROCEDURE — G0493 RN CARE EA 15 MIN HH/HOSPICE: HCPCS

## 2021-12-20 PROCEDURE — 665001 SOC-HOME HEALTH

## 2021-12-20 ASSESSMENT — ACTIVITIES OF DAILY LIVING (ADL)
HOME_HEALTH_OASIS: 01
OASIS_M1830: 01

## 2021-12-20 ASSESSMENT — ENCOUNTER SYMPTOMS
PERSON REPORTING PAIN: PATIENT
DENIES PAIN: 1

## 2021-12-20 ASSESSMENT — PATIENT HEALTH QUESTIONNAIRE - PHQ9: CLINICAL INTERPRETATION OF PHQ2 SCORE: 0

## 2021-12-21 ENCOUNTER — HOME CARE VISIT (OUTPATIENT)
Dept: HOME HEALTH SERVICES | Facility: HOME HEALTHCARE | Age: 86
End: 2021-12-21
Payer: MEDICARE

## 2021-12-21 DIAGNOSIS — I70.90 ARTERIAL OCCLUSION: ICD-10-CM

## 2021-12-21 NOTE — CASE COMMUNICATION
Quality Review Completed for [place] [facility] by RAMÓN Mathew RN on Dec 21,  2021:  Edits completed by RAMÓN Mathew RN:  1.  is NA to e. pressure ulcers per care plan interventions  2. Per narrative independent with transfers,  changed to 0

## 2021-12-22 ENCOUNTER — SUPERVISING PHYSICIAN REVIEW (OUTPATIENT)
Dept: VASCULAR LAB | Facility: MEDICAL CENTER | Age: 86
End: 2021-12-22

## 2021-12-22 ENCOUNTER — TELEPHONE (OUTPATIENT)
Dept: VASCULAR LAB | Facility: MEDICAL CENTER | Age: 86
End: 2021-12-22

## 2021-12-22 ENCOUNTER — HOSPITAL ENCOUNTER (OUTPATIENT)
Dept: RADIOLOGY | Facility: MEDICAL CENTER | Age: 86
End: 2021-12-22
Attending: NURSE PRACTITIONER
Payer: MEDICARE

## 2021-12-22 DIAGNOSIS — I71.9 AORTIC ANEURYSM WITHOUT RUPTURE, UNSPECIFIED PORTION OF AORTA (HCC): ICD-10-CM

## 2021-12-22 DIAGNOSIS — I71.40 ABDOMINAL AORTIC ANEURYSM (AAA) WITHOUT RUPTURE (HCC): ICD-10-CM

## 2021-12-22 PROCEDURE — 93978 VASCULAR STUDY: CPT | Mod: 26 | Performed by: INTERNAL MEDICINE

## 2021-12-22 PROCEDURE — 93978 VASCULAR STUDY: CPT

## 2021-12-22 NOTE — PROGRESS NOTES
Aortoiliac duplex with significant increase in size of AAA from previous - now 5.7 cm.     Will ask [facility] to call patient and arrange for CTA abd and pelvis and referral to vascular surgery ASAP.    Michael Bloch, MD  Vascular Care    Cc:   AKBAR Ruiz

## 2021-12-22 NOTE — TELEPHONE ENCOUNTER
Spoke with pt's son in law Bill over phone.  Let him know pt's AAA has grown significantly in size on recent ultrasound.  Order placed for STAT CT scan to be completed and urgent referral to Vascular Surgery faxed to [facility] with attention to Dr. Ruiz.  She has not had an CP, abdominal pain or back pain. Instructed if these occur she needs to go to ER immediately.  Given phone numbers for imaging scheduling as well as [facility].   Pt's son in law states understanding.    Charlene HUSTON  [facility]

## 2022-01-01 DIAGNOSIS — I71.40 ABDOMINAL AORTIC ANEURYSM (AAA) WITHOUT RUPTURE (HCC): ICD-10-CM

## 2022-01-03 ENCOUNTER — DOCUMENTATION (OUTPATIENT)
Dept: VASCULAR LAB | Facility: MEDICAL CENTER | Age: 87
End: 2022-01-03

## 2022-01-03 ENCOUNTER — TELEPHONE (OUTPATIENT)
Dept: VASCULAR LAB | Facility: MEDICAL CENTER | Age: 87
End: 2022-01-03

## 2022-01-03 NOTE — PROGRESS NOTES
Spoke with son-in-law about the CT scan and appt with Dr. Ruiz. The appt is on Thursday Jan 6 and they want to wait to see Dr. Ruiz first before they do another image. NOE Pierre.

## 2022-01-03 NOTE — TELEPHONE ENCOUNTER
Called and spoke to pt, pt was having a hard time hearing me. She requested that I call her son in law, Nii. Called and left a VM with Nii to call back regarding the below.     ----- Message from Michael J Bloch, M.D. sent at 1/1/2022  8:42 PM PST -----  Regarding: RE: AAA - needs CTA and wsg  LP -    Pls have patient call scheduling to get CTA schedule asap.   Also, find out if she has appt to see wsg yet.    Bloch  ----- Message -----  From: TANISHA HawthornePEdgarNEdgar  Sent: 12/22/2021  11:29 AM PST  To: Michael J Bloch, M.D.  Subject: RE: AAA - needs CTA and wsg                      Get her in before the snow and her birthday is on Christmas!  She may be getting an EVAR for her present....  ----- Message -----  From: Michael J Bloch, M.D.  Sent: 12/22/2021  11:04 AM PST  To: TANISHA HawthorneP.NEdgar  Subject: RE: AAA - needs CTA and wsg                      Should be fine  ----- Message -----  From: TANISHA HawthornePBASIL  Sent: 12/22/2021  10:56 AM PST  To: Michael J Bloch, M.D.  Subject: RE: AAA - needs CTA and wsg                      Oops- I ordered stat- that ok?  Brice  ----- Message -----  From: Michael J Bloch, M.D.  Sent: 12/22/2021  10:17 AM PST  To: TANISHA HawthorneP.NEdgar, #  Subject: AAA - needs CTA and wsg                          Brice - pls get in touch with patient and let him know that his aneurysm looks a lot bigger than previous. We will need to get a CTA to confirm and then he potentially might need an EVAR.     Assuming he not symptomatic order a routine CTA abd and pelvis and steel referral    Delaney - pls put   CTA abd/pelvis and [facility] consult on bonnie for maral (unless Brice tells you otherwise)    Thanks Bloch

## 2022-01-10 DIAGNOSIS — I71.40 ABDOMINAL AORTIC ANEURYSM (AAA) WITHOUT RUPTURE (HCC): ICD-10-CM

## 2022-01-12 ENCOUNTER — HOSPITAL ENCOUNTER (OUTPATIENT)
Dept: LAB | Facility: MEDICAL CENTER | Age: 87
End: 2022-01-12
Attending: NURSE PRACTITIONER
Payer: MEDICARE

## 2022-01-12 DIAGNOSIS — N18.31 STAGE 3A CHRONIC KIDNEY DISEASE: ICD-10-CM

## 2022-01-12 DIAGNOSIS — E03.9 HYPOTHYROIDISM, UNSPECIFIED TYPE: ICD-10-CM

## 2022-01-12 LAB
T4 FREE SERPL-MCNC: 0.49 NG/DL (ref 0.93–1.7)
TSH SERPL DL<=0.005 MIU/L-ACNC: 125 UIU/ML (ref 0.38–5.33)

## 2022-01-12 PROCEDURE — 84443 ASSAY THYROID STIM HORMONE: CPT

## 2022-01-12 PROCEDURE — 84439 ASSAY OF FREE THYROXINE: CPT

## 2022-01-12 PROCEDURE — 36415 COLL VENOUS BLD VENIPUNCTURE: CPT

## 2022-01-13 NOTE — RESULT ENCOUNTER NOTE
Luis Enrique Hinojosa,  Can you call this patient, Jackelin, or her son in law and find out if she is actually taking her thyroid medication.  This lab was ordered in July, and at that appointment she had elected not to treat her medical conditions.  In any event, can you schedule her a follow up to discuss this?  If she is not taking her thyroid medication, she should start it if she is now wanting to treat her medical conditions.    Thank you,  Noreen:)

## 2022-01-17 DIAGNOSIS — N18.31 STAGE 3A CHRONIC KIDNEY DISEASE: ICD-10-CM

## 2022-01-18 ENCOUNTER — APPOINTMENT (OUTPATIENT)
Dept: RADIOLOGY | Facility: MEDICAL CENTER | Age: 87
End: 2022-01-18
Attending: INTERNAL MEDICINE
Payer: MEDICARE

## 2022-01-18 ENCOUNTER — HOSPITAL ENCOUNTER (OUTPATIENT)
Dept: LAB | Facility: MEDICAL CENTER | Age: 87
End: 2022-01-18
Attending: INTERNAL MEDICINE
Payer: MEDICARE

## 2022-01-18 DIAGNOSIS — I71.40 ABDOMINAL AORTIC ANEURYSM (AAA) WITHOUT RUPTURE (HCC): ICD-10-CM

## 2022-01-18 LAB
ANION GAP SERPL CALC-SCNC: 10 MMOL/L (ref 7–16)
BUN SERPL-MCNC: 22 MG/DL (ref 8–22)
CALCIUM SERPL-MCNC: 9.2 MG/DL (ref 8.5–10.5)
CHLORIDE SERPL-SCNC: 106 MMOL/L (ref 96–112)
CO2 SERPL-SCNC: 25 MMOL/L (ref 20–33)
CREAT SERPL-MCNC: 0.95 MG/DL (ref 0.5–1.4)
FASTING STATUS PATIENT QL REPORTED: NORMAL
GLUCOSE SERPL-MCNC: 89 MG/DL (ref 65–99)
POTASSIUM SERPL-SCNC: 4.2 MMOL/L (ref 3.6–5.5)
SODIUM SERPL-SCNC: 141 MMOL/L (ref 135–145)

## 2022-01-18 PROCEDURE — 36415 COLL VENOUS BLD VENIPUNCTURE: CPT

## 2022-01-18 PROCEDURE — 80048 BASIC METABOLIC PNL TOTAL CA: CPT

## 2022-01-20 ENCOUNTER — PATIENT MESSAGE (OUTPATIENT)
Dept: HEALTH INFORMATION MANAGEMENT | Facility: OTHER | Age: 87
End: 2022-01-20
Payer: MEDICARE

## 2022-01-21 ENCOUNTER — HOSPITAL ENCOUNTER (OUTPATIENT)
Dept: RADIOLOGY | Facility: MEDICAL CENTER | Age: 87
End: 2022-01-21
Attending: INTERNAL MEDICINE
Payer: MEDICARE

## 2022-01-21 ENCOUNTER — DOCUMENTATION (OUTPATIENT)
Dept: VASCULAR LAB | Facility: MEDICAL CENTER | Age: 87
End: 2022-01-21

## 2022-01-21 DIAGNOSIS — I71.40 ABDOMINAL AORTIC ANEURYSM (AAA) WITHOUT RUPTURE (HCC): ICD-10-CM

## 2022-01-21 PROCEDURE — 74174 CTA ABD&PLVS W/CONTRAST: CPT | Mod: ME

## 2022-01-21 PROCEDURE — 700117 HCHG RX CONTRAST REV CODE 255: Performed by: INTERNAL MEDICINE

## 2022-01-21 RX ADMIN — IOHEXOL 83 ML: 350 INJECTION, SOLUTION INTRAVENOUS at 11:15

## 2022-01-21 NOTE — PROGRESS NOTES
CTA with max diameter AAA 5.0 cm.   Will await recs from dr ruiz, but likely will continue with medical management and repeat imaging in one year.     Michael Bloch, MD  Vascular Care    Cc: RICHIE Ruiz

## 2022-01-24 ENCOUNTER — DOCUMENTATION (OUTPATIENT)
Dept: VASCULAR LAB | Facility: MEDICAL CENTER | Age: 87
End: 2022-01-24

## 2022-01-24 NOTE — PROGRESS NOTES
Let pt know that the CTA (which is a better measurement) shows that the AAA is only 5.0 cm. We will  wait to see what Dr. Ruiz says, but we will likely just continue to keep an eye on it.     Put on bonnie for repeat CTA abd/pelvis in one year pending further recs from milvia. NOE Pierre.

## 2022-02-13 ENCOUNTER — PATIENT MESSAGE (OUTPATIENT)
Dept: MEDICAL GROUP | Facility: PHYSICIAN GROUP | Age: 87
End: 2022-02-13

## 2022-02-14 RX ORDER — LEVOTHYROXINE SODIUM 0.12 MG/1
125 TABLET ORAL
Qty: 90 TABLET | Refills: 1 | Status: SHIPPED | OUTPATIENT
Start: 2022-02-14

## 2022-02-14 NOTE — PATIENT COMMUNICATION
Received request via: Patient son    Was the patient seen in the last year in this department? Yes    Does the patient have an active prescription (recently filled or refills available) for medication(s) requested? No

## 2022-02-14 NOTE — TELEPHONE ENCOUNTER
From: Mary Murrieta  To: Nurse Practitioner Noreen Hugo  Sent: 2/13/2022 7:18 PM PST  Subject: thyroid medicine    Mary Jackelin Murrieta told me today at lunch that her thyroid prescription needs to be renewed. can you make sure the prescription is updated at [facility]?    thank you, Bill

## 2022-03-02 ENCOUNTER — DOCUMENTATION (OUTPATIENT)
Dept: VASCULAR LAB | Facility: MEDICAL CENTER | Age: 87
End: 2022-03-02
Payer: MEDICARE

## 2022-03-03 NOTE — PROGRESS NOTES
Will continue medical management and repeat CTA 1 year from previous as recommended by Dr. Ruiz    Patient overdue for vascular medicine follow-up.  Will ask MA to get patient scheduled if possible    Michael Bloch, MD  Vascular Medicine    Cc: RICHIE Ruiz

## 2022-05-17 ENCOUNTER — TELEPHONE (OUTPATIENT)
Dept: MEDICAL GROUP | Facility: PHYSICIAN GROUP | Age: 87
End: 2022-05-17
Payer: MEDICARE

## 2022-05-17 NOTE — TELEPHONE ENCOUNTER
1. Caller Name: Janice/Medical Examiner's Office                        Call Back Number: [phone]        states pt passed away from natural caused at her home on 5/13/22.  they are asking if you can sign death certificate.

## 2022-05-20 NOTE — TELEPHONE ENCOUNTER
1. Caller Name: [facility]/medical examiners office                        Call Back Number: [phone]      How would the patient prefer to be contacted with a response:     Called to check on status of death certificate.  States it is in EDRS que.  Call vital records at [phone] to get signed up for EDRS.

## 2022-05-31 NOTE — PROGRESS NOTES
I have reviewed and agree with history, assessment and plan for office encounter dated  5/18/2021 with TEODOAR Grier.  Face to face encounter/direct observation: No  Suggestions as follows: No changes to assessment or plan  -Chioma Mccray MD

## 2022-06-01 ENCOUNTER — TELEPHONE (OUTPATIENT)
Dept: MEDICAL GROUP | Facility: PHYSICIAN GROUP | Age: 87
End: 2022-06-01
Payer: MEDICARE

## 2022-06-02 NOTE — TELEPHONE ENCOUNTER
VOICEMAIL  1. Caller Name: Jim Ortega with the [email or web address].                              Call Back Number: [phone]    2. Message: Jim is calling regarding the death certificate that occurred on 5/13/22, it looks like It was sent but they need you to sign it.     3. Patient approves office to leave a detailed voicemail/MyChart message: N\A

## 2022-10-14 NOTE — CASE COMMUNICATION
Quality Review for 11/20/21 [facility] OASIS by ELSY Argueta RN on  November 29, 2021    Edits completed by ELSY Argueta RN:  1.  checked #2 per chart review  2. Per narrative that patient needs supervision and a walker for safety, the following changes were made: , , ,  are 2;  and  are 3  3.  is 3 per ambulation status  4. Safety measures checked ambulate only with assistance and adequate prateek gency plan  5. Completed F2F information       Information: Selecting Yes will display possible errors in your note based on the variables you have selected. This validation is only offered as a suggestion for you. PLEASE NOTE THAT THE VALIDATION TEXT WILL BE REMOVED WHEN YOU FINALIZE YOUR NOTE. IF YOU WANT TO FAX A PRELIMINARY NOTE YOU WILL NEED TO TOGGLE THIS TO 'NO' IF YOU DO NOT WANT IT IN YOUR FAXED NOTE.

## 2022-10-20 ENCOUNTER — DOCUMENTATION (OUTPATIENT)
Dept: VASCULAR LAB | Facility: MEDICAL CENTER | Age: 87
End: 2022-10-20

## 2022-10-20 DIAGNOSIS — I71.9 AORTIC ANEURYSM WITHOUT RUPTURE, UNSPECIFIED PORTION OF AORTA (HCC): ICD-10-CM

## 2022-12-13 ENCOUNTER — DOCUMENTATION (OUTPATIENT)
Dept: VASCULAR LAB | Facility: MEDICAL CENTER | Age: 87
End: 2022-12-13

## 2022-12-13 NOTE — PROGRESS NOTES
Tunii message sent to pt to remind that they are due for their surveillance vascular imaging.  Scheduling numbers provided.    Jyotsna ARAIZA  [facility]

## 2023-11-08 NOTE — CARE PLAN
Problem: Safety  Goal: Will remain free from injury  Outcome: PROGRESSING AS EXPECTED  Call light and personal belongings within reach of pt. Pt informed to use call light when needing to get up. Pt ambulate with walker and standby assist, unsteady gait. Will continue to monitor.      
None known

## 2024-10-16 NOTE — TELEPHONE ENCOUNTER
1. Caller Name: Mary                          Call Back Number: [phone] (home) [phone] (work)       Called pt to let her know about her new thyroid labs ordered by PRADIP Ho were available and she could go to the lab at any time to have them done.  Pt could not hear me and asked me to call back later.   Saturation: Site=PA (Pulmonary Artery) , O2=70 %, Hgb=15.3 gm/dl, Condition=Condition 1. Used in calculation.